# Patient Record
Sex: FEMALE | Race: WHITE | Employment: FULL TIME | URBAN - METROPOLITAN AREA
[De-identification: names, ages, dates, MRNs, and addresses within clinical notes are randomized per-mention and may not be internally consistent; named-entity substitution may affect disease eponyms.]

---

## 2017-02-09 ENCOUNTER — HOSPITAL ENCOUNTER (OUTPATIENT)
Age: 27
Setting detail: SPECIMEN
Discharge: HOME | End: 2017-02-09
Payer: MEDICAID

## 2017-02-09 ENCOUNTER — OFFICE VISIT (OUTPATIENT)
Dept: OBGYN | Facility: CLINIC | Age: 27
End: 2017-02-09

## 2017-02-09 VITALS
SYSTOLIC BLOOD PRESSURE: 131 MMHG | HEIGHT: 60 IN | HEART RATE: 137 BPM | BODY MASS INDEX: 39.13 KG/M2 | TEMPERATURE: 98.1 F | RESPIRATION RATE: 19 BRPM | WEIGHT: 199.3 LBS | DIASTOLIC BLOOD PRESSURE: 85 MMHG

## 2017-02-09 DIAGNOSIS — Z71.1 CONCERN ABOUT STD IN FEMALE WITHOUT DIAGNOSIS: ICD-10-CM

## 2017-02-09 DIAGNOSIS — R30.0 BURNING WITH URINATION: Primary | ICD-10-CM

## 2017-02-09 DIAGNOSIS — N90.89 VULVAR LESION: ICD-10-CM

## 2017-02-09 DIAGNOSIS — N89.8 VAGINAL DISCHARGE: ICD-10-CM

## 2017-02-09 LAB
DIRECT EXAM: ABNORMAL
HAV IGM SER IA-ACNC: NONREACTIVE
HEPATITIS B CORE IGM ANTIBODY: NONREACTIVE
HEPATITIS B SURFACE ANTIGEN: NONREACTIVE
HEPATITIS C ANTIBODY: NONREACTIVE
HIV AG/AB: NONREACTIVE
Lab: ABNORMAL
SPECIMEN DESCRIPTION: ABNORMAL
STATUS: ABNORMAL
T. PALLIDUM, IGG: NONREACTIVE

## 2017-02-09 PROCEDURE — 86695 HERPES SIMPLEX TYPE 1 TEST: CPT

## 2017-02-09 PROCEDURE — 87389 HIV-1 AG W/HIV-1&-2 AB AG IA: CPT

## 2017-02-09 PROCEDURE — 80074 ACUTE HEPATITIS PANEL: CPT

## 2017-02-09 PROCEDURE — 86696 HERPES SIMPLEX TYPE 2 TEST: CPT

## 2017-02-09 PROCEDURE — 99203 OFFICE O/P NEW LOW 30 MIN: CPT | Performed by: OBSTETRICS & GYNECOLOGY

## 2017-02-09 PROCEDURE — 86694 HERPES SIMPLEX NES ANTBDY: CPT

## 2017-02-09 PROCEDURE — 86780 TREPONEMA PALLIDUM: CPT

## 2017-02-09 PROCEDURE — 36415 COLL VENOUS BLD VENIPUNCTURE: CPT

## 2017-02-09 RX ORDER — VALACYCLOVIR HYDROCHLORIDE 500 MG/1
1000 TABLET, FILM COATED ORAL 2 TIMES DAILY
Qty: 40 TABLET | Refills: 0 | Status: SHIPPED | OUTPATIENT
Start: 2017-02-09 | End: 2017-03-08 | Stop reason: SDUPTHER

## 2017-02-10 LAB
C TRACH DNA GENITAL QL NAA+PROBE: NEGATIVE
CULTURE: NORMAL
CULTURE: NORMAL
Lab: NORMAL
N. GONORRHOEAE DNA: NEGATIVE
SPECIMEN DESCRIPTION: NORMAL
STATUS: NORMAL

## 2017-02-11 LAB
CULTURE: ABNORMAL
Lab: ABNORMAL
SPECIMEN DESCRIPTION: ABNORMAL
STATUS: ABNORMAL

## 2017-02-13 LAB
HERPES SIMPLEX VIRUS 1 IGG: 0.79
HERPES SIMPLEX VIRUS 2 IGG: 0.18
HERPES TYPE 1/2 IGM COMBINED: 6.87

## 2017-02-14 RX ORDER — METRONIDAZOLE 500 MG/1
500 TABLET ORAL 2 TIMES DAILY
Qty: 14 TABLET | Refills: 0 | Status: SHIPPED | OUTPATIENT
Start: 2017-02-14 | End: 2017-02-21

## 2017-02-14 RX ORDER — FLUCONAZOLE 150 MG/1
150 TABLET ORAL ONCE
Qty: 2 TABLET | Refills: 0 | Status: SHIPPED | OUTPATIENT
Start: 2017-02-14 | End: 2017-02-14

## 2017-03-08 DIAGNOSIS — B00.9 HERPES: Primary | ICD-10-CM

## 2017-03-08 RX ORDER — VALACYCLOVIR HYDROCHLORIDE 500 MG/1
1000 TABLET, FILM COATED ORAL 2 TIMES DAILY
Qty: 40 TABLET | Refills: 0 | Status: SHIPPED | OUTPATIENT
Start: 2017-03-08 | End: 2017-03-18

## 2017-03-29 ENCOUNTER — HOSPITAL ENCOUNTER (OUTPATIENT)
Age: 27
Setting detail: SPECIMEN
Discharge: HOME | End: 2017-03-29
Payer: MEDICAID

## 2017-03-29 ENCOUNTER — OFFICE VISIT (OUTPATIENT)
Dept: OBGYN | Age: 27
End: 2017-03-29
Payer: MEDICAID

## 2017-03-29 VITALS
SYSTOLIC BLOOD PRESSURE: 117 MMHG | BODY MASS INDEX: 32.4 KG/M2 | WEIGHT: 194.5 LBS | RESPIRATION RATE: 19 BRPM | HEART RATE: 101 BPM | DIASTOLIC BLOOD PRESSURE: 76 MMHG | HEIGHT: 65 IN | TEMPERATURE: 98.4 F

## 2017-03-29 DIAGNOSIS — A60.04 HERPES SIMPLEX VULVOVAGINITIS: ICD-10-CM

## 2017-03-29 DIAGNOSIS — Z01.419 WELL WOMAN EXAM: Primary | ICD-10-CM

## 2017-03-29 PROBLEM — A60.00 HERPES GENITALIA: Status: ACTIVE | Noted: 2017-03-29

## 2017-03-29 LAB
DIRECT EXAM: ABNORMAL
Lab: ABNORMAL
SPECIMEN DESCRIPTION: ABNORMAL
STATUS: ABNORMAL

## 2017-03-29 PROCEDURE — 99395 PREV VISIT EST AGE 18-39: CPT | Performed by: OBSTETRICS & GYNECOLOGY

## 2017-03-29 RX ORDER — VALACYCLOVIR HYDROCHLORIDE 500 MG/1
1000 TABLET, FILM COATED ORAL 2 TIMES DAILY
COMMUNITY
End: 2018-03-22 | Stop reason: SDUPTHER

## 2017-03-29 RX ORDER — VALACYCLOVIR HYDROCHLORIDE 500 MG/1
500 TABLET, FILM COATED ORAL 2 TIMES DAILY
Qty: 6 TABLET | Refills: 3 | Status: SHIPPED | OUTPATIENT
Start: 2017-03-29 | End: 2017-04-01

## 2017-03-30 LAB
C TRACH DNA GENITAL QL NAA+PROBE: NEGATIVE
N. GONORRHOEAE DNA: NEGATIVE

## 2017-04-03 DIAGNOSIS — B96.89 BACTERIAL VAGINITIS: Primary | ICD-10-CM

## 2017-04-03 DIAGNOSIS — N76.0 BACTERIAL VAGINITIS: Primary | ICD-10-CM

## 2017-04-03 LAB — CYTOLOGY REPORT: NORMAL

## 2017-04-03 RX ORDER — METRONIDAZOLE 500 MG/1
500 TABLET ORAL 2 TIMES DAILY
Qty: 14 TABLET | Refills: 0 | Status: SHIPPED | OUTPATIENT
Start: 2017-04-03 | End: 2017-04-10

## 2018-03-22 ENCOUNTER — OFFICE VISIT (OUTPATIENT)
Dept: INTERNAL MEDICINE | Age: 28
End: 2018-03-22
Payer: MEDICAID

## 2018-03-22 VITALS
WEIGHT: 199 LBS | HEIGHT: 60 IN | BODY MASS INDEX: 39.07 KG/M2 | DIASTOLIC BLOOD PRESSURE: 70 MMHG | HEART RATE: 68 BPM | SYSTOLIC BLOOD PRESSURE: 123 MMHG

## 2018-03-22 DIAGNOSIS — E66.09 CLASS 2 OBESITY DUE TO EXCESS CALORIES WITHOUT SERIOUS COMORBIDITY WITH BODY MASS INDEX (BMI) OF 38.0 TO 38.9 IN ADULT: ICD-10-CM

## 2018-03-22 DIAGNOSIS — A60.04 HERPES SIMPLEX VULVOVAGINITIS: Primary | ICD-10-CM

## 2018-03-22 DIAGNOSIS — E78.5 DYSLIPIDEMIA: ICD-10-CM

## 2018-03-22 DIAGNOSIS — F17.200 SMOKER: ICD-10-CM

## 2018-03-22 DIAGNOSIS — Z11.59 ENCOUNTER FOR HEPATITIS C SCREENING TEST FOR LOW RISK PATIENT: ICD-10-CM

## 2018-03-22 PROBLEM — E66.812 CLASS 2 OBESITY DUE TO EXCESS CALORIES WITHOUT SERIOUS COMORBIDITY WITH BODY MASS INDEX (BMI) OF 38.0 TO 38.9 IN ADULT: Status: ACTIVE | Noted: 2018-03-22

## 2018-03-22 PROCEDURE — 99203 OFFICE O/P NEW LOW 30 MIN: CPT | Performed by: INTERNAL MEDICINE

## 2018-03-22 PROCEDURE — G8484 FLU IMMUNIZE NO ADMIN: HCPCS | Performed by: INTERNAL MEDICINE

## 2018-03-22 PROCEDURE — G8427 DOCREV CUR MEDS BY ELIG CLIN: HCPCS | Performed by: INTERNAL MEDICINE

## 2018-03-22 PROCEDURE — G8417 CALC BMI ABV UP PARAM F/U: HCPCS | Performed by: INTERNAL MEDICINE

## 2018-03-22 PROCEDURE — 99213 OFFICE O/P EST LOW 20 MIN: CPT

## 2018-03-22 PROCEDURE — 4004F PT TOBACCO SCREEN RCVD TLK: CPT | Performed by: INTERNAL MEDICINE

## 2018-03-22 RX ORDER — VALACYCLOVIR HYDROCHLORIDE 500 MG/1
1000 TABLET, FILM COATED ORAL 2 TIMES DAILY
Qty: 60 TABLET | Refills: 2 | Status: SHIPPED | OUTPATIENT
Start: 2018-03-22 | End: 2018-10-29 | Stop reason: SDUPTHER

## 2018-03-22 ASSESSMENT — ENCOUNTER SYMPTOMS
NAUSEA: 0
SINUS PAIN: 0
BLURRED VISION: 0
BLOOD IN STOOL: 0
SPUTUM PRODUCTION: 0
COUGH: 0
EYE REDNESS: 0
BACK PAIN: 1
CONSTIPATION: 0
ABDOMINAL PAIN: 0
SHORTNESS OF BREATH: 0

## 2018-03-22 ASSESSMENT — PATIENT HEALTH QUESTIONNAIRE - PHQ9
2. FEELING DOWN, DEPRESSED OR HOPELESS: 0
1. LITTLE INTEREST OR PLEASURE IN DOING THINGS: 0
SUM OF ALL RESPONSES TO PHQ9 QUESTIONS 1 & 2: 0
SUM OF ALL RESPONSES TO PHQ QUESTIONS 1-9: 0

## 2018-03-22 NOTE — PROGRESS NOTES
Visit Information    Have you changed or started any medications since your last visit including any over-the-counter medicines, vitamins, or herbal medicines? no   Are you having any side effects from any of your medications? -  no  Have you stopped taking any of your medications? Is so, why? -  no    Have you seen any other physician or provider since your last visit? No  Have you had any other diagnostic tests since your last visit? No  Have you been seen in the emergency room and/or had an admission to a hospital since we last saw you? No  Have you had your routine dental cleaning in the past 6 months? no    Have you activated your [employer] account? If not, what are your barriers?  No:      Patient Care Team:  Sae Pritchard MD as PCP - General (Internal Medicine)    Medical History Review  Past Medical, Family, and Social History reviewed and does contribute to the patient presenting condition    Health Maintenance   Topic Date Due    Pneumococcal med risk (1 of 1 - PPSV23) 04/10/2007    Flu vaccine (1) 09/01/2017    Cervical cancer screen  03/29/2020    DTaP/Tdap/Td vaccine (2 - Td) 10/13/2026    HIV screen  Completed
Component Value Date    WBC 9.6 07/28/2015    HGB 13.2 07/28/2015     07/28/2015     BMP:    Lab Results   Component Value Date     02/25/2014    K 4.3 02/25/2014     02/25/2014    CO2 26 02/25/2014    BUN 10 02/25/2014    CREATININE 0.56 02/25/2014    GLUCOSE 93 07/28/2015     Hemoglobin A1C:   Lab Results   Component Value Date    LABA1C 4.9 07/28/2015     Lipid profile:   Lab Results   Component Value Date    CHOL 236 07/28/2015    TRIG 195 07/28/2015    HDL 37 07/28/2015     Lab Results   Component Value Date    LDLCHOLESTEROL 160 (H) 07/28/2015       Thyroid functions:   Lab Results   Component Value Date    TSH 2.86 07/28/2015      Hepatic functions: No results found for: ALT, AST, PROT, BILITOT, BILIDIR, LABALBU  ASSESSMENT AND PLAN:   1. Herpes simplex vulvovaginitis    - valACYclovir (VALTREX) 500 MG tablet; Take 2 tablets by mouth 2 times daily  Dispense: 60 tablet; Refill: 2    2. Dyslipidemia    - Lipid Panel; Future    3. Encounter for hepatitis C screening test for low risk patient    - Hepatitis Panel, Acute; Future    4. Class 2 obesity due to excess calories without serious comorbidity with body mass index (BMI) of 38.0 to 38.9 in adult    - Lipid Panel; Future  - Hemoglobin A1C; Future    5. Smoker      INSTRUCTIONS:   1. Return in about 6 months (around 9/22/2018). 2. Juan Sinclair received counseling on the following healthy behaviors: nutrition, exercise,  and tobacco cessation    3. Reviewed prior labs and health maintenance. 4. Discussed use, benefit, and side effects of prescribed medications. Barriers to medication compliance addressed. All patient questions answered. Pt voiced understanding.        Cristy Boston MD    3/22/2018, 3:08 PM

## 2018-07-02 ENCOUNTER — TELEPHONE (OUTPATIENT)
Dept: INTERNAL MEDICINE | Age: 28
End: 2018-07-02

## 2018-10-29 ENCOUNTER — HOSPITAL ENCOUNTER (OUTPATIENT)
Age: 28
Setting detail: SPECIMEN
Discharge: HOME | End: 2018-10-29
Payer: MEDICAID

## 2018-10-29 ENCOUNTER — OFFICE VISIT (OUTPATIENT)
Dept: INTERNAL MEDICINE | Age: 28
End: 2018-10-29
Payer: MEDICAID

## 2018-10-29 VITALS
WEIGHT: 208 LBS | BODY MASS INDEX: 39.27 KG/M2 | HEIGHT: 61 IN | HEART RATE: 74 BPM | SYSTOLIC BLOOD PRESSURE: 120 MMHG | DIASTOLIC BLOOD PRESSURE: 78 MMHG

## 2018-10-29 DIAGNOSIS — E78.5 DYSLIPIDEMIA: Primary | ICD-10-CM

## 2018-10-29 DIAGNOSIS — E78.5 DYSLIPIDEMIA: ICD-10-CM

## 2018-10-29 DIAGNOSIS — Z11.59 ENCOUNTER FOR HEPATITIS C SCREENING TEST FOR LOW RISK PATIENT: ICD-10-CM

## 2018-10-29 DIAGNOSIS — E66.09 CLASS 2 OBESITY DUE TO EXCESS CALORIES WITHOUT SERIOUS COMORBIDITY WITH BODY MASS INDEX (BMI) OF 38.0 TO 38.9 IN ADULT: ICD-10-CM

## 2018-10-29 DIAGNOSIS — A60.04 HERPES SIMPLEX VULVOVAGINITIS: ICD-10-CM

## 2018-10-29 DIAGNOSIS — F17.200 SMOKER: ICD-10-CM

## 2018-10-29 LAB
CHOLESTEROL/HDL RATIO: 4.7
CHOLESTEROL: 236 MG/DL
ESTIMATED AVERAGE GLUCOSE: 97 MG/DL
HAV IGM SER IA-ACNC: NONREACTIVE
HBA1C MFR BLD: 5 % (ref 4–6)
HDLC SERPL-MCNC: 50 MG/DL
HEPATITIS B CORE IGM ANTIBODY: NONREACTIVE
HEPATITIS B SURFACE ANTIGEN: NONREACTIVE
HEPATITIS C ANTIBODY: NONREACTIVE
LDL CHOLESTEROL: 152 MG/DL (ref 0–130)
TRIGL SERPL-MCNC: 170 MG/DL
VLDLC SERPL CALC-MCNC: ABNORMAL MG/DL (ref 1–30)

## 2018-10-29 PROCEDURE — 99211 OFF/OP EST MAY X REQ PHY/QHP: CPT | Performed by: INTERNAL MEDICINE

## 2018-10-29 PROCEDURE — 99213 OFFICE O/P EST LOW 20 MIN: CPT | Performed by: INTERNAL MEDICINE

## 2018-10-29 PROCEDURE — 80074 ACUTE HEPATITIS PANEL: CPT

## 2018-10-29 PROCEDURE — 80061 LIPID PANEL: CPT

## 2018-10-29 PROCEDURE — 83036 HEMOGLOBIN GLYCOSYLATED A1C: CPT

## 2018-10-29 PROCEDURE — 36415 COLL VENOUS BLD VENIPUNCTURE: CPT

## 2018-10-29 RX ORDER — VALACYCLOVIR HYDROCHLORIDE 500 MG/1
1000 TABLET, FILM COATED ORAL 2 TIMES DAILY
Qty: 10 TABLET | Refills: 3 | Status: SHIPPED | OUTPATIENT
Start: 2018-10-29 | End: 2019-12-05 | Stop reason: SDUPTHER

## 2018-10-29 NOTE — PROGRESS NOTES
completed and found to be normal except noted in the HPI    Review of Systems   Constitutional: Negative for appetite change, fatigue and fever. Respiratory: Negative for cough, shortness of breath and wheezing. Cardiovascular: Negative for chest pain, palpitations and leg swelling. Endocrine: Negative for polydipsia and polyuria. Genitourinary: Negative for dysuria, frequency and genital sores. Allergic/Immunologic: Negative for environmental allergies and immunocompromised state. Neurological: Negative for dizziness, weakness and headaches. Hematological: Negative for adenopathy. Does not bruise/bleed easily. PHYSICAL EXAM:     Vitals:    10/29/18 0943   BP: 120/78   Site: Left Upper Arm   Position: Sitting   Cuff Size: Medium Adult   Pulse: 74   Weight: 208 lb (94.3 kg)   Height: 5' 1\" (1.549 m)     Body mass index is 39.3 kg/m². BP Readings from Last 3 Encounters:   10/29/18 120/78   03/22/18 123/70   03/29/17 117/76        Wt Readings from Last 3 Encounters:   10/29/18 208 lb (94.3 kg)   03/22/18 199 lb (90.3 kg)   03/29/17 194 lb 8 oz (88.2 kg)       Physical Exam      HENT: Normocephalic, Atraumatic,  Neck- Normal range of motion, No tenderness, Supple, No stridor. Eyes:  PERRL, EOMI, Conjunctiva normal, No discharge. Respiratory:  Normalbreath sounds, No respiratory distress, No wheezing  Cardiovascular:  Normal heart rate, Normal rhythm, No murmurs   GI:  Bowel sounds normal, Soft, No tenderness  Musculoskeletal:  Intact distal pulses, No edema, No tenderness  Integument:  Warm, Dry, No erythema, No rash. Lymphatic:  No lymphadenopathy noted.    Neurologic:  Alert & oriented x 3, Normal motor function, Normal sensory function    LABORATORY FINDINGS:    CBC:  Lab Results   Component Value Date    WBC 9.6 07/28/2015    HGB 13.2 07/28/2015     07/28/2015     BMP:    Lab Results   Component Value Date     02/25/2014    K 4.3 02/25/2014     02/25/2014    CO2 26

## 2018-11-04 ASSESSMENT — ENCOUNTER SYMPTOMS
WHEEZING: 0
SHORTNESS OF BREATH: 0
COUGH: 0

## 2019-03-27 ENCOUNTER — TELEPHONE (OUTPATIENT)
Dept: INTERNAL MEDICINE | Age: 29
End: 2019-03-27

## 2019-05-09 ENCOUNTER — OFFICE VISIT (OUTPATIENT)
Dept: INTERNAL MEDICINE | Age: 29
End: 2019-05-09
Payer: MEDICAID

## 2019-05-09 VITALS
BODY MASS INDEX: 37.19 KG/M2 | HEIGHT: 61 IN | WEIGHT: 197 LBS | SYSTOLIC BLOOD PRESSURE: 122 MMHG | DIASTOLIC BLOOD PRESSURE: 81 MMHG | HEART RATE: 88 BPM

## 2019-05-09 DIAGNOSIS — A60.04 HERPES SIMPLEX VULVOVAGINITIS: ICD-10-CM

## 2019-05-09 DIAGNOSIS — F17.200 SMOKER: ICD-10-CM

## 2019-05-09 DIAGNOSIS — E78.5 DYSLIPIDEMIA: Primary | ICD-10-CM

## 2019-05-09 DIAGNOSIS — E66.09 CLASS 2 OBESITY DUE TO EXCESS CALORIES WITHOUT SERIOUS COMORBIDITY WITH BODY MASS INDEX (BMI) OF 38.0 TO 38.9 IN ADULT: ICD-10-CM

## 2019-05-09 PROCEDURE — G8427 DOCREV CUR MEDS BY ELIG CLIN: HCPCS | Performed by: INTERNAL MEDICINE

## 2019-05-09 PROCEDURE — 99213 OFFICE O/P EST LOW 20 MIN: CPT | Performed by: INTERNAL MEDICINE

## 2019-05-09 PROCEDURE — 4004F PT TOBACCO SCREEN RCVD TLK: CPT | Performed by: INTERNAL MEDICINE

## 2019-05-09 PROCEDURE — 99211 OFF/OP EST MAY X REQ PHY/QHP: CPT | Performed by: INTERNAL MEDICINE

## 2019-05-09 PROCEDURE — G8417 CALC BMI ABV UP PARAM F/U: HCPCS | Performed by: INTERNAL MEDICINE

## 2019-05-09 ASSESSMENT — PATIENT HEALTH QUESTIONNAIRE - PHQ9
SUM OF ALL RESPONSES TO PHQ9 QUESTIONS 1 & 2: 0
2. FEELING DOWN, DEPRESSED OR HOPELESS: 0
SUM OF ALL RESPONSES TO PHQ QUESTIONS 1-9: 0
SUM OF ALL RESPONSES TO PHQ QUESTIONS 1-9: 0
1. LITTLE INTEREST OR PLEASURE IN DOING THINGS: 0

## 2019-05-09 NOTE — PROGRESS NOTES
review of symptoms completed and found to be normal except noted in the HPI    Review of Systems   Constitutional: Negative for appetite change, fatigue and fever. Respiratory: Negative for cough, shortness of breath and wheezing. Cardiovascular: Negative for chest pain, palpitations and leg swelling. Endocrine: Negative for polydipsia and polyuria. Genitourinary: Negative for dysuria, frequency and genital sores. Allergic/Immunologic: Negative for environmental allergies and immunocompromised state. Neurological: Negative for dizziness, weakness and headaches. Hematological: Negative for adenopathy. Does not bruise/bleed easily. PHYSICAL EXAM:     Vitals:    05/09/19 1130   BP: 122/81   Site: Left Upper Arm   Position: Sitting   Cuff Size: Medium Adult   Pulse: 88   Weight: 197 lb (89.4 kg)   Height: 5' 1\" (1.549 m)     Body mass index is 37.22 kg/m². BP Readings from Last 3 Encounters:   05/09/19 122/81   10/29/18 120/78   03/22/18 123/70        Wt Readings from Last 3 Encounters:   05/09/19 197 lb (89.4 kg)   10/29/18 208 lb (94.3 kg)   03/22/18 199 lb (90.3 kg)       Physical Exam      HENT: Normocephalic, Atraumatic,  Neck- Normal range of motion, No tenderness, Supple, No stridor. Eyes:  PERRL, EOMI, Conjunctiva normal, No discharge. Respiratory:  Normalbreath sounds, No respiratory distress, No wheezing  Cardiovascular:  Normal heart rate, Normal rhythm, No murmurs   GI:  Bowel sounds normal, Soft, No tenderness  Musculoskeletal:  Intact distal pulses, No edema, No tenderness  Integument:  Warm, Dry, No erythema, No rash. Lymphatic:  No lymphadenopathy noted.    Neurologic:  Alert & oriented x 3, Normal motor function, Normal sensory function    LABORATORY FINDINGS:    CBC:  Lab Results   Component Value Date    WBC 9.6 07/28/2015    HGB 13.2 07/28/2015     07/28/2015     BMP:    Lab Results   Component Value Date     02/25/2014    K 4.3 02/25/2014     02/25/2014 CO2 26 02/25/2014    BUN 10 02/25/2014    CREATININE 0.56 02/25/2014    GLUCOSE 93 07/28/2015     HEMOGLOBIN A1C:   Lab Results   Component Value Date    LABA1C 5.0 10/29/2018     MICROALBUMIN URINE: No results found for: MICROALBUR  FASTING LIPID [email or web address]  Lab Results   Component Value Date    LDLCHOLESTEROL 152 (H) 10/29/2018       LIVER PROFILE:No results found for: ALT, AST, PROT, BILITOT, BILIDIR, LABALBU   THYROID FUNCTION:   Lab Results   Component Value Date    TSH 2.86 07/28/2015      URINEANALYSIS: No results found for: LABURIN  ASSESSMENT AND PLAN:    1. Dyslipidemia    - Lipid Panel; Future  - Comprehensive Metabolic Panel; Future  - TSH with Reflex; Future    2. Herpes simplex vulvovaginitis  Valtrex as needed      3. Class 2 obesity due to excess calories without serious comorbidity with body mass index (BMI) of 38.0 to 38.9 in adult    - TSH with Reflex; Future    4. Smoker            FOLLOW UP AND INSTRUCTIONS:   Return in about 6 months (around 11/9/2019). 1. Long Arzate received counseling on the following healthy behaviors: nutrition and exercise    2. Reviewed prior labs and health maintenance. 3. Discussed use, benefit, and side effects of prescribed medications. Barriers to medication compliance addressed. All patient questions answered. Pt voiced understanding.      4. Patient given educational materials - see patient instructions    Rut Steel  Attending Physician, [address] [place] [address], Internal Medicine Residency Program  [address] [place] [address]  5/9/2019, 11:43 AM

## 2019-05-09 NOTE — PATIENT INSTRUCTIONS
LABORATORY INSTRUCTIONS    Your doctor has ordered blood or urine testing. You can get this testing done at the Lab located on the first floor of the [facility], or at any other [facility]. Please stop at Main Registration, before going to the lab, as you must be registered first.     Please get this lab done before your next visit. You may NOT eat, drink, smoke, or chew anything before this test for 8-12 hours. You may still have water. We will call you to schedule your 6 month follow up appointment. Please return to the clinic as needed. After Visit Summary  given and reviewed. --MA    It is very important for your care that you keep your appointment. If for some reason you are unable to keep your appointment it is equally important that you call our office at [phone] to cancel your appointment and reschedule. Failure to do so may result in your termination from our practice.

## 2019-08-19 ENCOUNTER — TELEPHONE (OUTPATIENT)
Dept: INTERNAL MEDICINE | Age: 29
End: 2019-08-19

## 2019-11-27 ENCOUNTER — TELEPHONE (OUTPATIENT)
Dept: INTERNAL MEDICINE | Age: 29
End: 2019-11-27

## 2019-12-05 ENCOUNTER — TELEPHONE (OUTPATIENT)
Dept: INTERNAL MEDICINE | Age: 29
End: 2019-12-05

## 2019-12-05 ENCOUNTER — OFFICE VISIT (OUTPATIENT)
Dept: INTERNAL MEDICINE | Age: 29
End: 2019-12-05
Payer: MEDICAID

## 2019-12-05 ENCOUNTER — HOSPITAL ENCOUNTER (OUTPATIENT)
Age: 29
Setting detail: SPECIMEN
Discharge: HOME | End: 2019-12-05
Payer: MEDICAID

## 2019-12-05 VITALS
HEART RATE: 97 BPM | WEIGHT: 220 LBS | DIASTOLIC BLOOD PRESSURE: 87 MMHG | SYSTOLIC BLOOD PRESSURE: 118 MMHG | BODY MASS INDEX: 41.57 KG/M2

## 2019-12-05 DIAGNOSIS — E66.09 CLASS 2 OBESITY DUE TO EXCESS CALORIES WITHOUT SERIOUS COMORBIDITY WITH BODY MASS INDEX (BMI) OF 38.0 TO 38.9 IN ADULT: ICD-10-CM

## 2019-12-05 DIAGNOSIS — R53.83 OTHER FATIGUE: ICD-10-CM

## 2019-12-05 DIAGNOSIS — E78.5 DYSLIPIDEMIA: Primary | ICD-10-CM

## 2019-12-05 DIAGNOSIS — A60.04 HERPES SIMPLEX VULVOVAGINITIS: ICD-10-CM

## 2019-12-05 DIAGNOSIS — E78.5 DYSLIPIDEMIA: ICD-10-CM

## 2019-12-05 LAB
ABSOLUTE EOS #: 0.34 K/UL (ref 0–0.44)
ABSOLUTE IMMATURE GRANULOCYTE: 0.04 K/UL (ref 0–0.3)
ABSOLUTE LYMPH #: 2.38 K/UL (ref 1.1–3.7)
ABSOLUTE MONO #: 0.49 K/UL (ref 0.1–1.2)
ALBUMIN SERPL-MCNC: 4.4 G/DL (ref 3.5–5.2)
ALBUMIN/GLOBULIN RATIO: 1.7 (ref 1–2.5)
ALP BLD-CCNC: 74 U/L (ref 35–104)
ALT SERPL-CCNC: 22 U/L (ref 5–33)
ANION GAP SERPL CALCULATED.3IONS-SCNC: 11 MMOL/L (ref 9–17)
AST SERPL-CCNC: 16 U/L
BASOPHILS # BLD: 1 % (ref 0–2)
BASOPHILS ABSOLUTE: 0.04 K/UL (ref 0–0.2)
BILIRUB SERPL-MCNC: 0.27 MG/DL (ref 0.3–1.2)
BUN BLDV-MCNC: 13 MG/DL (ref 6–20)
BUN/CREAT BLD: ABNORMAL (ref 9–20)
CALCIUM SERPL-MCNC: 9.1 MG/DL (ref 8.6–10.4)
CHLORIDE BLD-SCNC: 106 MMOL/L (ref 98–107)
CHOLESTEROL/HDL RATIO: 5.3
CHOLESTEROL: 237 MG/DL
CO2: 22 MMOL/L (ref 20–31)
CREAT SERPL-MCNC: 0.61 MG/DL (ref 0.5–0.9)
DIFFERENTIAL TYPE: ABNORMAL
EOSINOPHILS RELATIVE PERCENT: 4 % (ref 1–4)
GFR AFRICAN AMERICAN: >60 ML/MIN
GFR NON-AFRICAN AMERICAN: >60 ML/MIN
GFR SERPL CREATININE-BSD FRML MDRD: ABNORMAL ML/MIN/{1.73_M2}
GFR SERPL CREATININE-BSD FRML MDRD: ABNORMAL ML/MIN/{1.73_M2}
GLUCOSE BLD-MCNC: 94 MG/DL (ref 70–99)
HCT VFR BLD CALC: 44.2 % (ref 36.3–47.1)
HDLC SERPL-MCNC: 45 MG/DL
HEMOGLOBIN: 14.4 G/DL (ref 11.9–15.1)
IMMATURE GRANULOCYTES: 1 %
LDL CHOLESTEROL: 154 MG/DL (ref 0–130)
LYMPHOCYTES # BLD: 27 % (ref 24–43)
MCH RBC QN AUTO: 28.9 PG (ref 25.2–33.5)
MCHC RBC AUTO-ENTMCNC: 32.6 G/DL (ref 28.4–34.8)
MCV RBC AUTO: 88.6 FL (ref 82.6–102.9)
MONOCYTES # BLD: 6 % (ref 3–12)
NRBC AUTOMATED: 0 PER 100 WBC
PDW BLD-RTO: 12.1 % (ref 11.8–14.4)
PLATELET # BLD: 262 K/UL (ref 138–453)
PLATELET ESTIMATE: ABNORMAL
PMV BLD AUTO: 11.8 FL (ref 8.1–13.5)
POTASSIUM SERPL-SCNC: 4.2 MMOL/L (ref 3.7–5.3)
RBC # BLD: 4.99 M/UL (ref 3.95–5.11)
RBC # BLD: ABNORMAL 10*6/UL
SEG NEUTROPHILS: 61 % (ref 36–65)
SEGMENTED NEUTROPHILS ABSOLUTE COUNT: 5.5 K/UL (ref 1.5–8.1)
SODIUM BLD-SCNC: 139 MMOL/L (ref 135–144)
TOTAL PROTEIN: 7 G/DL (ref 6.4–8.3)
TRIGL SERPL-MCNC: 190 MG/DL
TSH SERPL DL<=0.05 MIU/L-ACNC: 1.09 MIU/L (ref 0.3–5)
VLDLC SERPL CALC-MCNC: ABNORMAL MG/DL (ref 1–30)
WBC # BLD: 8.8 K/UL (ref 3.5–11.3)
WBC # BLD: ABNORMAL 10*3/UL

## 2019-12-05 PROCEDURE — 80061 LIPID PANEL: CPT

## 2019-12-05 PROCEDURE — 36415 COLL VENOUS BLD VENIPUNCTURE: CPT

## 2019-12-05 PROCEDURE — 80053 COMPREHEN METABOLIC PANEL: CPT

## 2019-12-05 PROCEDURE — 99211 OFF/OP EST MAY X REQ PHY/QHP: CPT | Performed by: INTERNAL MEDICINE

## 2019-12-05 PROCEDURE — G8417 CALC BMI ABV UP PARAM F/U: HCPCS | Performed by: INTERNAL MEDICINE

## 2019-12-05 PROCEDURE — G8484 FLU IMMUNIZE NO ADMIN: HCPCS | Performed by: INTERNAL MEDICINE

## 2019-12-05 PROCEDURE — G8427 DOCREV CUR MEDS BY ELIG CLIN: HCPCS | Performed by: INTERNAL MEDICINE

## 2019-12-05 PROCEDURE — 84443 ASSAY THYROID STIM HORMONE: CPT

## 2019-12-05 PROCEDURE — 4004F PT TOBACCO SCREEN RCVD TLK: CPT | Performed by: INTERNAL MEDICINE

## 2019-12-05 PROCEDURE — 99213 OFFICE O/P EST LOW 20 MIN: CPT | Performed by: INTERNAL MEDICINE

## 2019-12-05 PROCEDURE — 85025 COMPLETE CBC W/AUTO DIFF WBC: CPT

## 2019-12-05 RX ORDER — VALACYCLOVIR HYDROCHLORIDE 500 MG/1
1000 TABLET, FILM COATED ORAL 2 TIMES DAILY
Qty: 60 TABLET | Refills: 2 | Status: SHIPPED | OUTPATIENT
Start: 2019-12-05 | End: 2021-06-23 | Stop reason: SDUPTHER

## 2019-12-05 RX ORDER — VALACYCLOVIR HYDROCHLORIDE 500 MG/1
1000 TABLET, FILM COATED ORAL 2 TIMES DAILY
Qty: 10 TABLET | Refills: 3 | Status: SHIPPED | OUTPATIENT
Start: 2019-12-05 | End: 2019-12-05

## 2019-12-05 ASSESSMENT — ENCOUNTER SYMPTOMS
ABDOMINAL PAIN: 0
COUGH: 0
WHEEZING: 0
BLOOD IN STOOL: 0
SHORTNESS OF BREATH: 0
CONSTIPATION: 0

## 2020-01-16 ENCOUNTER — OFFICE VISIT (OUTPATIENT)
Dept: INTERNAL MEDICINE | Age: 30
End: 2020-01-16
Payer: MEDICAID

## 2020-01-16 VITALS
SYSTOLIC BLOOD PRESSURE: 121 MMHG | DIASTOLIC BLOOD PRESSURE: 79 MMHG | HEART RATE: 87 BPM | WEIGHT: 220 LBS | BODY MASS INDEX: 41.57 KG/M2

## 2020-01-16 PROCEDURE — 4004F PT TOBACCO SCREEN RCVD TLK: CPT | Performed by: INTERNAL MEDICINE

## 2020-01-16 PROCEDURE — G8484 FLU IMMUNIZE NO ADMIN: HCPCS | Performed by: INTERNAL MEDICINE

## 2020-01-16 PROCEDURE — 99211 OFF/OP EST MAY X REQ PHY/QHP: CPT | Performed by: INTERNAL MEDICINE

## 2020-01-16 PROCEDURE — 99213 OFFICE O/P EST LOW 20 MIN: CPT | Performed by: INTERNAL MEDICINE

## 2020-01-16 PROCEDURE — G8417 CALC BMI ABV UP PARAM F/U: HCPCS | Performed by: INTERNAL MEDICINE

## 2020-01-16 PROCEDURE — G8427 DOCREV CUR MEDS BY ELIG CLIN: HCPCS | Performed by: INTERNAL MEDICINE

## 2020-01-16 RX ORDER — PHENTERMINE HYDROCHLORIDE 37.5 MG/1
37.5 TABLET ORAL
Qty: 30 TABLET | Refills: 0 | Status: SHIPPED | OUTPATIENT
Start: 2020-01-16 | End: 2020-02-18 | Stop reason: SDUPTHER

## 2020-01-16 ASSESSMENT — PATIENT HEALTH QUESTIONNAIRE - PHQ9
1. LITTLE INTEREST OR PLEASURE IN DOING THINGS: 0
SUM OF ALL RESPONSES TO PHQ QUESTIONS 1-9: 0
SUM OF ALL RESPONSES TO PHQ9 QUESTIONS 1 & 2: 0
2. FEELING DOWN, DEPRESSED OR HOPELESS: 0
SUM OF ALL RESPONSES TO PHQ QUESTIONS 1-9: 0

## 2020-01-16 NOTE — PROGRESS NOTES
Visit Information    Have you changed or started any medications since your last visit including any over-the-counter medicines, vitamins, or herbal medicines? no   Are you having any side effects from any of your medications? -  no  Have you stopped taking any of your medications? Is so, why? -  no    Have you seen any other physician or provider since your last visit? No  Have you had any other diagnostic tests since your last visit? No  Have you been seen in the emergency room and/or had an admission to a hospital since we last saw you? No  Have you had your routine dental cleaning in the past 6 months? no    Have you activated your [employer] account? If not, what are your barriers?  Yes     Patient Care Team:  Karmen Villa MD as PCP - General (Internal Medicine)  Karmen Villa MD as PCP - [facility]    Medical History Review  Past Medical, Family, and Social History reviewed and does contribute to the patient presenting condition    Health Maintenance   Topic Date Due    Pneumococcal 0-64 years Vaccine (1 of 1 - PPSV23) 04/10/1994    Flu vaccine (1) 09/01/2019    Cervical cancer screen  03/29/2020    DTaP/Tdap/Td vaccine (2 - Td) 10/13/2026    HIV screen  Completed    Varicella Vaccine  Discontinued

## 2020-01-16 NOTE — PROGRESS NOTES
09/01/2019       REVIEW OF SYSTEMS:    12 point review of symptoms completed and found to be normal except noted in the HPI    Review of Systems   Constitutional: Positive for fatigue and unexpected weight change. Respiratory: Negative for cough, shortness of breath and wheezing. Cardiovascular: Negative for chest pain, palpitations and leg swelling. Gastrointestinal: Negative for abdominal pain, blood in stool and constipation. Endocrine: Negative for polydipsia and polyuria. Genitourinary: Positive for menstrual problem. Negative for dysuria, flank pain and urgency. Skin: Negative for rash and wound. Neurological: Negative for dizziness, weakness and headaches. Hematological: Negative for adenopathy. Does not bruise/bleed easily.         PHYSICAL EXAM:     Vitals:    01/16/20 0959   BP: 121/79   Site: Right Upper Arm   Position: Sitting   Cuff Size: Large Adult   Pulse: 87   Weight: 220 lb (99.8 kg)     Body mass index is 41.57 kg/m². BP Readings from Last 3 Encounters:   01/16/20 121/79   12/05/19 118/87   05/09/19 122/81        Wt Readings from Last 3 Encounters:   01/16/20 220 lb (99.8 kg)   12/05/19 220 lb (99.8 kg)   05/09/19 197 lb (89.4 kg)       Physical Exam  HENT: Normocephalic, Atraumatic,  Neck- Normal range of motion, No tenderness, Supple, No stridor. Eyes:  PERRL, EOMI, Conjunctiva normal, No discharge. Respiratory:  Normalbreath sounds, No respiratory distress, No wheezing  Cardiovascular:  Normal heart rate, Normal rhythm, No murmurs   GI:  Bowel sounds normal, Soft, No tenderness  Musculoskeletal:  Intact distal pulses, No edema, No tenderness  Integument:  Warm, Dry, No erythema, No rash. Lymphatic:  No lymphadenopathy noted.    Neurologic:  Alert & oriented x 3, Normal motor function, Normal sensory function    LABORATORY FINDINGS:    CBC:  Lab Results   Component Value Date    WBC 8.8 12/05/2019    HGB 14.4 12/05/2019     12/05/2019     BMP:    Lab Results Component Value Date     12/05/2019    K 4.2 12/05/2019     12/05/2019    CO2 22 12/05/2019    BUN 13 12/05/2019    CREATININE 0.61 12/05/2019    GLUCOSE 94 12/05/2019     HEMOGLOBIN A1C:   Lab Results   Component Value Date    LABA1C 5.0 10/29/2018     MICROALBUMIN URINE: No results found for: MICROALBUR  FASTING LIPID [email or web address]  Lab Results   Component Value Date    LDLCHOLESTEROL 154 (H) 12/05/2019       LIVER PROFILE:  Lab Results   Component Value Date    ALT 22 12/05/2019    AST 16 12/05/2019    PROT 7.0 12/05/2019    BILITOT 0.27 12/05/2019    LABALBU 4.4 12/05/2019      THYROID FUNCTION:   Lab Results   Component Value Date    TSH 1.09 12/05/2019      URINEANALYSIS: No results found for: LABURIN  ASSESSMENT AND PLAN:    1. Morbid obesity with BMI of 40.0-44.9, adult (HCC)  Side effects explained  Advised diet changes  increase physical activity  Hydration  Call if side effects    - phentermine (ADIPEX-P) 37.5 MG tablet; Take 1 tablet by mouth every morning (before breakfast) for 30 days. Dispense: 30 tablet; Refill: 0    2. Dyslipidemia  Diet changes  Start statins next time if not improved  discussed          FOLLOW UP AND INSTRUCTIONS:   Return in about 4 weeks (around 2/13/2020). 1. Reggie Reyes received counseling on the following healthy behaviors: nutrition, exercise and medication adherence    2. Reviewed prior labs and health maintenance. 3. Discussed use, benefit, and side effects of prescribed medications. Barriers to medication compliance addressed. All patient questions answered. Pt voiced understanding.        Geovanni Lucas  Attending Physician, [address] [place] [address], Internal Medicine Residency Program  [address] [place] [address]  1/16/2020, 10:22 AM

## 2020-01-16 NOTE — PATIENT INSTRUCTIONS
levels help your doctor find out your risk for having a heart attack or stroke. You and your doctor can talk about whether you need to lower your risk and what treatment is best for you. A heart-healthy lifestyle along with medicines can help lower your cholesterol and your risk. The way you choose to lower your risk will depend on how high your risk is for heart attack and stroke. It will also depend on how you feel about taking medicines. Follow-up care is a key part of your treatment and safety. Be sure to make and go to all appointments, and call your doctor if you are having problems. It's also a good idea to know your test results and keep a list of the medicines you take. How can you care for yourself at home? · Eat a variety of foods every day. Good choices include fruits, vegetables, whole grains (like oatmeal), dried beans and peas, nuts and seeds, soy products (like tofu), and fat-free or low-fat dairy products. · Replace butter, margarine, and hydrogenated or partially hydrogenated oils with olive and canola oils. (Canola oil margarine without trans fat is fine.)  · Replace red meat with fish, poultry, and soy protein (like tofu). · Limit processed and packaged foods like chips, crackers, and cookies. · Bake, broil, or steam foods. Don't abreu them. · Be physically active. Get at least 30 minutes of exercise on most days of the week. Walking is a good choice. You also may want to do other activities, such as running, swimming, cycling, or playing tennis or team sports. · Stay at a healthy weight or lose weight by making the changes in eating and physical activity listed above. Losing just a small amount of weight, even 5 to 10 pounds, can reduce your risk for having a heart attack or stroke. · Do not smoke. When should you call for help?   Watch closely for changes in your health, and be sure to contact your doctor if:    · You need help making lifestyle changes.     · You have questions about your medicine. Where can you learn more? Go to [email or web address] and sign in to your [employer] account. Enter V008 in the Sprio box to learn more about \"High Cholesterol: Care Instructions. \"     If you do not have an account, please click on the \"Sign Up Now\" link. Current as of: April 9, 2019  Content Version: 12.3  © 1235-7720 Healthwise, [employer]. Care instructions adapted under license by [facility] ([facility]). If you have questions about a medical condition or this instruction, always ask your healthcare professional. [place] [address] any warranty or liability for your use of this information. Return To Clinic 2/18/2020 . After Visit Summary  given and reviewed. It is very important for your care that you keep your appointment. If for some reason you are unable to keep your appointment it is equally important that you call our office at [phone] to cancel your appointment and reschedule.  Failure to do so may result in your termination from our practice.     -Printed script for Adipex signed and given to pt--Valentina

## 2020-02-18 ENCOUNTER — OFFICE VISIT (OUTPATIENT)
Dept: INTERNAL MEDICINE | Age: 30
End: 2020-02-18
Payer: MEDICAID

## 2020-02-18 VITALS
HEART RATE: 75 BPM | WEIGHT: 213 LBS | DIASTOLIC BLOOD PRESSURE: 76 MMHG | HEIGHT: 61 IN | BODY MASS INDEX: 40.22 KG/M2 | SYSTOLIC BLOOD PRESSURE: 118 MMHG

## 2020-02-18 PROCEDURE — 99211 OFF/OP EST MAY X REQ PHY/QHP: CPT | Performed by: INTERNAL MEDICINE

## 2020-02-18 PROCEDURE — G8427 DOCREV CUR MEDS BY ELIG CLIN: HCPCS | Performed by: INTERNAL MEDICINE

## 2020-02-18 PROCEDURE — G8417 CALC BMI ABV UP PARAM F/U: HCPCS | Performed by: INTERNAL MEDICINE

## 2020-02-18 PROCEDURE — 4004F PT TOBACCO SCREEN RCVD TLK: CPT | Performed by: INTERNAL MEDICINE

## 2020-02-18 PROCEDURE — 99213 OFFICE O/P EST LOW 20 MIN: CPT | Performed by: INTERNAL MEDICINE

## 2020-02-18 PROCEDURE — G8484 FLU IMMUNIZE NO ADMIN: HCPCS | Performed by: INTERNAL MEDICINE

## 2020-02-18 RX ORDER — PHENTERMINE HYDROCHLORIDE 37.5 MG/1
37.5 TABLET ORAL
Qty: 30 TABLET | Refills: 0 | Status: SHIPPED | OUTPATIENT
Start: 2020-02-18 | End: 2020-03-16 | Stop reason: SDUPTHER

## 2020-02-18 NOTE — PATIENT INSTRUCTIONS
Return To Clinic 3/17/2020 . After Visit Summary  given and reviewed. It is very important for your care that you keep your appointment. If for some reason you are unable to keep your appointment it is equally important that you call our office at [phone] to cancel your appointment and reschedule. Failure to do so may result in your termination from our practice.     -Printed script for Adipex signed and given to pt    MALKA David Courser

## 2020-02-18 NOTE — PROGRESS NOTES
Visit Information    Have you changed or started any medications since your last visit including any over-the-counter medicines, vitamins, or herbal medicines? no   Are you having any side effects from any of your medications? -  no  Have you stopped taking any of your medications? Is so, why? -  no    Have you seen any other physician or provider since your last visit? No  Have you had any other diagnostic tests since your last visit? No  Have you been seen in the emergency room and/or had an admission to a hospital since we last saw you? No  Have you had your routine dental cleaning in the past 6 months? no    Have you activated your [facility] account? If not, what are your barriers?  Yes     Patient Care Team:  Clement Alston MD as PCP - General (Internal Medicine)  Clement Alston MD as PCP - [facility]    Medical History Review  Past Medical, Family, and Social History reviewed and does contribute to the patient presenting condition    Health Maintenance   Topic Date Due    Flu vaccine (1) 06/30/2020 (Originally 9/1/2019)    Pneumococcal 0-64 years Vaccine (1 of 1 - PPSV23) 07/16/2020 (Originally 4/10/1994)    Cervical cancer screen  03/29/2020    DTaP/Tdap/Td vaccine (2 - Td) 10/13/2026    Shingles Vaccine (1 of 2) 04/10/2038    HIV screen  Completed    Hepatitis A vaccine  Aged Out    Hepatitis B vaccine  Aged Out    Hib vaccine  Aged Out    Meningococcal (ACWY) vaccine  Aged Out    Varicella vaccine  Discontinued

## 2020-02-18 NOTE — PROGRESS NOTES
[facility]    Progress Note    Date of patient's visit: 2020    Patient's Name:  Sara Hussein    YOB: 1990            Patient Care Team:  Chava Benito MD as PCP - General (Internal Medicine)  Chava Benito MD as PCP - [facility]    REASON FOR VISIT: Routine outpatient follow     Chief Complaint   Patient presents with    Weight Loss     Pt is here for adipex refill          HISTORY OF PRESENT ILLNESS:    History was obtained from the patient. Sara Hussein is a 30 y.o. is here for up on morbid obesity. She was started on Adipex last.  She is doing well. She did for the first 2 or 3 days she felt a little jittery but that has resolved. No sleep issues. No palpitations. She is still going to the gym regularly. She is trying to eat right. She is lost only about 6 pounds. No other concerns. Past Medical History:   Diagnosis Date    HSV        Past Surgical History:   Procedure Laterality Date     SECTION           ALLERGIES    No Known Allergies    MEDICATIONS:      Current Outpatient Medications on File Prior to Visit   Medication Sig Dispense Refill    valACYclovir (VALTREX) 500 MG tablet Take 2 tablets by mouth 2 times daily 60 tablet 2     No current facility-administered medications on file prior to visit. SOCIAL HISTORY    Reviewed and no change from previous record. Isabel Samson  reports that she has been smoking cigarettes. She has a 5.00 pack-year smoking history. She has never used smokeless tobacco.    FAMILY HISTORY:    Reviewed and No change from previous visit    HEALTH MAINTENANCE DUE:    There are no preventive care reminders to display for this patient. REVIEW OF SYSTEMS:    12 point review of symptoms completed and found to be normal except noted in the HPI    Review of Systems     Constitutional: Positive for fatigue and unexpected weight change.    Respiratory: Negative

## 2020-03-05 ENCOUNTER — OFFICE VISIT (OUTPATIENT)
Dept: OBGYN | Age: 30
End: 2020-03-05
Payer: MEDICAID

## 2020-03-05 ENCOUNTER — HOSPITAL ENCOUNTER (OUTPATIENT)
Age: 30
Setting detail: SPECIMEN
Discharge: HOME | End: 2020-03-05
Payer: MEDICAID

## 2020-03-05 VITALS
BODY MASS INDEX: 40.31 KG/M2 | HEART RATE: 62 BPM | DIASTOLIC BLOOD PRESSURE: 83 MMHG | HEIGHT: 61 IN | SYSTOLIC BLOOD PRESSURE: 131 MMHG | WEIGHT: 213.5 LBS

## 2020-03-05 PROCEDURE — 99395 PREV VISIT EST AGE 18-39: CPT | Performed by: OBSTETRICS & GYNECOLOGY

## 2020-03-05 PROCEDURE — 90651 9VHPV VACCINE 2/3 DOSE IM: CPT | Performed by: OBSTETRICS & GYNECOLOGY

## 2020-03-05 PROCEDURE — G8484 FLU IMMUNIZE NO ADMIN: HCPCS | Performed by: OBSTETRICS & GYNECOLOGY

## 2020-03-05 NOTE — PROGRESS NOTES
History and Physical    Kristal Win  3/5/2020              30 y.o. Chief Complaint   Patient presents with    Gynecologic Exam     pap 3/29/17 wnl        Patient's last menstrual period was 01/15/2020. Primary Care Physician: Jen Allen MD    The patient was seen and examined. She has no chief complaint today and is here for her annual exam.  Her bowels are regular. There are no voiding complaints. She denies any bloating. She denies vaginal discharge and was counseled on STD's and the need for barrier contraception. HPI : Kristal Win is a 30 y.o. female     Pt here for annual exam.    Patient states her periods are irregular and painful. She also desires pregnancy, but has not been able to get pregnant since her last delivery. LMP 2020-2020. Pt states they are occurring once every three months. When they occur, the pain is affecting her ADLs and sometimes she has to call off of work. Explained to patient that we should do a workup for her irregular periods, including blood work and ultrasound. Pt is amenable.  ________________________________________________________________________  OB History    Para Term  AB Living   1 1 0 1 0 1   SAB TAB Ectopic Molar Multiple Live Births   0 0 0 0 0 1      # Outcome Date GA Lbr Joe/2nd Weight Sex Delivery Anes PTL Lv   1  08 28w0d  2 lb 11 oz (1.219 kg) F CS-Unspec  Y RENEE      Obstetric Comments   G1- Pt states she had a \"low placenta\" and she began to contract and bleed.   [facility].      Past Medical History:   Diagnosis Date    HSV                                                                    Past Surgical History:   Procedure Laterality Date     SECTION       Family History   Problem Relation Age of Onset    Breast Cancer Mother 48        breast ca    High Blood Pressure Mother     Breast Cancer Father         skin ca    Heart Disease Father         heart transplant    Diabetes Father      Social History     Socioeconomic History    Marital status: Single     Spouse name: Not on file    Number of children: Not on file    Years of education: Not on file    Highest education level: Not on file   Occupational History    Not on file   Social Needs    Financial resource strain: Not on file    Food insecurity:     Worry: Not on file     Inability: Not on file    Transportation needs:     Medical: Not on file     Non-medical: Not on file   Tobacco Use    Smoking status: Current Every Day Smoker     Packs/day: 0.50     Years: 10.00     Pack years: 5.00     Types: Cigarettes    Smokeless tobacco: Never Used   Substance and Sexual Activity    Alcohol use: Yes     Comment: socially    Drug use: No    Sexual activity: Not on file   Lifestyle    Physical activity:     Days per week: Not on file     Minutes per session: Not on file    Stress: Not on file   Relationships    Social connections:     Talks on phone: Not on file     Gets together: Not on file     Attends Sikhism service: Not on file     Active member of club or organization: Not on file     Attends meetings of clubs or organizations: Not on file     Relationship status: Not on file    Intimate partner violence:     Fear of current or ex partner: Not on file     Emotionally abused: Not on file     Physically abused: Not on file     Forced sexual activity: Not on file   Other Topics Concern    Not on file   Social History Narrative    Not on file       MEDICATIONS:  Current Outpatient Medications   Medication Sig Dispense Refill    phentermine (ADIPEX-P) 37.5 MG tablet Take 1 tablet by mouth every morning (before breakfast) for 30 days. 30 tablet 0    valACYclovir (VALTREX) 500 MG tablet Take 2 tablets by mouth 2 times daily 60 tablet 2     No current facility-administered medications for this visit.             ALLERGIES:  Allergies as of 03/05/2020    (No Known Allergies)       Symptoms of No Dysuria, Hematuria or Nocturia. No Urinary Incontinence or Vaginal Discharge  Musculoskeletal ROS: No Arthralgia, Arthritis,Gout,Osteoporosis or Rheumatism  Neurological ROS: No CVA, Migraines, Epilepsy, Seizure Hx, or Limb Weakness  Dermatological ROS: No Rash, Itching, Hives, Mole Changes or Cancer                                                                                                                                                                                                                                  PHYSICAL Exam:     Constitutional:  Vitals:    03/05/20 0858   BP: 131/83   Site: Right Upper Arm   Position: Sitting   Cuff Size: Large Adult   Pulse: 62   Weight: 213 lb 8 oz (96.8 kg)   Height: 5' 1\" (1.549 m)         General Appearance: This  is a well Developed, well Nourished, well groomed female. Her BMI was reviewed. Nutritional decision making was discussed. Skin:  There was a Normal Inspection of the skin without rashes or lesions. There were no rashes. (Papular, Maculopapular, Hives, Pustular, Macular)     There were no lesions (Ulcers, Erythema, Abn. Appearing Nevi)            Lymphatic:  No Lymph Nodes were Palpable in the neck , axilla or groin.  0 # Of Lymph Nodes; Location ; Character [Normal]  [Shotty] [Tender] [Enlarged]     Neck and EENT:  The neck was supple. There were no masses   The thyroid was not enlarged and had no masses. Perrla, EOMI B/L, TMI B/L No Abnormalities. Throat inspected-No exudates or Masses, Nares Patent No Masses        Respiratory: The lungs were auscultated and found to be clear. There were no rales, rhonchi or wheezes. There was a good respiratory effort. Cardiovascular: The heart was in a regular rate and rhythm. . No S3 or S4. There was no murmur appreciated. Location, grade, and radiation are not applicable. Extremities: The patients extremities were without calf tenderness, edema, or varicosities.   There was full range of

## 2020-03-06 LAB
DIRECT EXAM: ABNORMAL
Lab: ABNORMAL
SPECIMEN DESCRIPTION: ABNORMAL

## 2020-03-06 RX ORDER — METRONIDAZOLE 500 MG/1
500 TABLET ORAL 2 TIMES DAILY
Qty: 14 TABLET | Refills: 0 | Status: SHIPPED | OUTPATIENT
Start: 2020-03-06 | End: 2020-03-13

## 2020-03-07 LAB
HPV SAMPLE: NORMAL
HPV, GENOTYPE 16: NOT DETECTED
HPV, GENOTYPE 18: NOT DETECTED
HPV, HIGH RISK OTHER: NOT DETECTED
HPV, INTERPRETATION: NORMAL
SPECIMEN DESCRIPTION: NORMAL

## 2020-03-09 LAB
C TRACH DNA GENITAL QL NAA+PROBE: NEGATIVE
N. GONORRHOEAE DNA: NEGATIVE
SPECIMEN DESCRIPTION: NORMAL

## 2020-03-13 LAB — CYTOLOGY REPORT: NORMAL

## 2020-03-16 RX ORDER — PHENTERMINE HYDROCHLORIDE 37.5 MG/1
37.5 TABLET ORAL
Qty: 30 TABLET | Refills: 0 | Status: SHIPPED | OUTPATIENT
Start: 2020-03-16 | End: 2020-04-15

## 2020-05-13 ENCOUNTER — ANCILLARY PROCEDURE (OUTPATIENT)
Dept: OBGYN | Age: 30
End: 2020-05-13
Payer: MEDICAID

## 2020-05-13 PROCEDURE — 76830 TRANSVAGINAL US NON-OB: CPT | Performed by: RADIOLOGY

## 2020-05-13 PROCEDURE — 76856 US EXAM PELVIC COMPLETE: CPT | Performed by: RADIOLOGY

## 2020-05-15 ENCOUNTER — HOSPITAL ENCOUNTER (OUTPATIENT)
Age: 30
Discharge: HOME | End: 2020-05-15
Payer: MEDICAID

## 2020-05-15 LAB
ABSOLUTE EOS #: 0.29 K/UL (ref 0–0.44)
ABSOLUTE IMMATURE GRANULOCYTE: 0.03 K/UL (ref 0–0.3)
ABSOLUTE LYMPH #: 2.37 K/UL (ref 1.1–3.7)
ABSOLUTE MONO #: 0.57 K/UL (ref 0.1–1.2)
BASOPHILS # BLD: 1 % (ref 0–2)
BASOPHILS ABSOLUTE: 0.05 K/UL (ref 0–0.2)
DIFFERENTIAL TYPE: NORMAL
EOSINOPHILS RELATIVE PERCENT: 3 % (ref 1–4)
ESTIMATED AVERAGE GLUCOSE: 91 MG/DL
GLUCOSE FASTING: 100 MG/DL (ref 70–99)
HBA1C MFR BLD: 4.8 % (ref 4–6)
HCG QUANTITATIVE: <1 IU/L
HCT VFR BLD CALC: 43.7 % (ref 36.3–47.1)
HEMOGLOBIN: 14.3 G/DL (ref 11.9–15.1)
IMMATURE GRANULOCYTES: 0 %
INSULIN COMMENT: NORMAL
INSULIN REFERENCE RANGE:: NORMAL
INSULIN: 31.2 MU/L
LYMPHOCYTES # BLD: 25 % (ref 24–43)
MCH RBC QN AUTO: 28.5 PG (ref 25.2–33.5)
MCHC RBC AUTO-ENTMCNC: 32.7 G/DL (ref 28.4–34.8)
MCV RBC AUTO: 87.2 FL (ref 82.6–102.9)
MONOCYTES # BLD: 6 % (ref 3–12)
NRBC AUTOMATED: 0 PER 100 WBC
PDW BLD-RTO: 12.5 % (ref 11.8–14.4)
PLATELET # BLD: 249 K/UL (ref 138–453)
PLATELET ESTIMATE: NORMAL
PMV BLD AUTO: 11.6 FL (ref 8.1–13.5)
PROLACTIN: 16.41 UG/L (ref 4.79–23.3)
RBC # BLD: 5.01 M/UL (ref 3.95–5.11)
RBC # BLD: NORMAL 10*6/UL
SEG NEUTROPHILS: 65 % (ref 36–65)
SEGMENTED NEUTROPHILS ABSOLUTE COUNT: 6.22 K/UL (ref 1.5–8.1)
TSH SERPL DL<=0.05 MIU/L-ACNC: 1.43 MIU/L (ref 0.3–5)
WBC # BLD: 9.5 K/UL (ref 3.5–11.3)
WBC # BLD: NORMAL 10*3/UL

## 2020-05-15 PROCEDURE — 82947 ASSAY GLUCOSE BLOOD QUANT: CPT

## 2020-05-15 PROCEDURE — 36415 COLL VENOUS BLD VENIPUNCTURE: CPT

## 2020-05-15 PROCEDURE — 83525 ASSAY OF INSULIN: CPT

## 2020-05-15 PROCEDURE — 84443 ASSAY THYROID STIM HORMONE: CPT

## 2020-05-15 PROCEDURE — 83036 HEMOGLOBIN GLYCOSYLATED A1C: CPT

## 2020-05-15 PROCEDURE — 84702 CHORIONIC GONADOTROPIN TEST: CPT

## 2020-05-15 PROCEDURE — 84146 ASSAY OF PROLACTIN: CPT

## 2020-05-15 PROCEDURE — 85025 COMPLETE CBC W/AUTO DIFF WBC: CPT

## 2020-07-01 ENCOUNTER — OFFICE VISIT (OUTPATIENT)
Dept: OBGYN | Age: 30
End: 2020-07-01
Payer: MEDICAID

## 2020-07-01 VITALS
TEMPERATURE: 97.9 F | BODY MASS INDEX: 39.3 KG/M2 | SYSTOLIC BLOOD PRESSURE: 137 MMHG | WEIGHT: 208 LBS | HEART RATE: 76 BPM | DIASTOLIC BLOOD PRESSURE: 98 MMHG

## 2020-07-01 PROCEDURE — 4004F PT TOBACCO SCREEN RCVD TLK: CPT | Performed by: OBSTETRICS & GYNECOLOGY

## 2020-07-01 PROCEDURE — 99213 OFFICE O/P EST LOW 20 MIN: CPT | Performed by: OBSTETRICS & GYNECOLOGY

## 2020-07-01 PROCEDURE — G8427 DOCREV CUR MEDS BY ELIG CLIN: HCPCS | Performed by: OBSTETRICS & GYNECOLOGY

## 2020-07-01 PROCEDURE — 99212 OFFICE O/P EST SF 10 MIN: CPT

## 2020-07-01 PROCEDURE — G8417 CALC BMI ABV UP PARAM F/U: HCPCS | Performed by: OBSTETRICS & GYNECOLOGY

## 2020-07-01 RX ORDER — .BETA.-CAROTENE, ASCORBIC ACID, CHOLECALCIFEROL, .ALPHA.-TOCOPHEROL ACETATE, DL-, THIAMINE MONONITRATE, RIBOFLAVIN, NIACINAMIDE, PYRIDOXINE HYDROCHLORIDE, FOLIC ACID, CYANOCOBALAMIN, CALCIUM PANTOTHENATE, CALCIUM CARBONATE, FERROUS FUMARATE, ZINC OXIDE, AND DOCUSATE SODIUM 1000; 100; 400; 30; 3; 3; 15; 20; 1; 12; 7; 200; 29; 20; 25 [IU]/1; MG/1; [IU]/1; [IU]/1; MG/1; MG/1; MG/1; MG/1; MG/1; UG/1; MG/1; MG/1; MG/1; MG/1; MG/1
1 TABLET, COATED ORAL DAILY
Qty: 30 TABLET | Refills: 11 | Status: SHIPPED | OUTPATIENT
Start: 2020-07-01

## 2020-07-01 NOTE — PROGRESS NOTES
Elizabeth Taylor  2020    YOB: 1990          The patient was seen today. She is here regarding follow up on AUB . Her bowels are regular and she is voiding without difficulty. HPI:  Elizabeth Taylor is a 26 y.o. female      Patient here for follow up on her irregular periods. Patient had a period in May, but no period in 2020. Period in May lasted 4 days, light flow, and she had severe cramping. She states her periods are usually light, just the cramping is the bad part. Patient still desires pregnancy as well. We discussed the results of her ultrasound and lab work. Pt's US was essentially unremarkable, it did show a possible septum or bicornuate uterus, but pt has been pregnant before, so I am less concerned about this preventing pregnancy. Patient's lab work was unremarkable as well. Her TSH, prolactin, CBC and HgbA1C were normal.  Her fasting glucose to insulin ratio was significantly decreased. I explained to patient that the insulin level may be effecting her ability to release her eggs once a month. We discussed starting metformin. She is aware of the potential side effects of metformin, but she is willing to proceed with trying it to regulate her periods. We will start with a low dose and titrate up as needed. We also discussed smoking cessation and weight loss. Smoking cessation because of pt's general health and her desire to conceive. Weight loss because of her desire to conceive as well, explained that even a 10% weight loss can help regulate her cycles better. Will start pt on PNV as well. Once pt's cycles are regular, if pt has not conceived after a year, will need further workup for infertility. Pt's BP elevated today, she did smoke a cigarette right before arriving to clinic. She denies any chest pain, changes in vision, numbness/tingling, headache. Encouraged her to go to ER if these occur or any other s/s of MI/stroke. Encouraged her to stop smoking, and to follow up with her PCP. OB History    Para Term  AB Living   1 1 0 1 0 1   SAB TAB Ectopic Molar Multiple Live Births   0 0 0 0 0 1      # Outcome Date GA Lbr Joe/2nd Weight Sex Delivery Anes PTL Lv   1  / 28w0d  2 lb 11 oz (1.219 kg) F CS-Unspec  Y RENEE      Obstetric Comments   G1- Pt states she had a \"low placenta\" and she began to contract and bleed.   [facility].        Past Medical History:   Diagnosis Date    HSV        Past Surgical History:   Procedure Laterality Date     SECTION         Family History   Problem Relation Age of Onset    Breast Cancer Mother 48        breast ca    High Blood Pressure Mother     Breast Cancer Father         skin ca    Heart Disease Father         heart transplant    Diabetes Father        Social History     Socioeconomic History    Marital status: Single     Spouse name: Not on file    Number of children: Not on file    Years of education: Not on file    Highest education level: Not on file   Occupational History    Not on file   Social Needs    Financial resource strain: Not on file    Food insecurity     Worry: Not on file     Inability: Not on file    Transportation needs     Medical: Not on file     Non-medical: Not on file   Tobacco Use    Smoking status: Current Every Day Smoker     Packs/day: 0.50     Years: 10.00     Pack years: 5.00     Types: Cigarettes    Smokeless tobacco: Never Used   Substance and Sexual Activity    Alcohol use: Yes     Comment: socially    Drug use: No    Sexual activity: Not on file   Lifestyle    Physical activity     Days per week: Not on file     Minutes per session: Not on file    Stress: Not on file   Relationships    Social connections     Talks on phone: Not on file     Gets together: Not on file     Attends Bahai service: Not on file     Active member of club or organization: Not on file     Attends meetings of clubs or organizations: Not on file     Relationship status: Not on file    Intimate partner violence     Fear of current or ex partner: Not on file     Emotionally abused: Not on file     Physically abused: Not on file     Forced sexual activity: Not on file   Other Topics Concern    Not on file   Social History Narrative    Not on file         MEDICATIONS:  Current Outpatient Medications   Medication Sig Dispense Refill    metFORMIN (GLUCOPHAGE) 500 MG tablet Take 1 tablet by mouth 2 times daily (with meals) 180 tablet 1    Prenatal Vit-DSS-Fe Fum-FA (PRENATAL 19) 29-1 MG TABS Take 1 tablet by mouth daily 30 tablet 11    valACYclovir (VALTREX) 500 MG tablet Take 2 tablets by mouth 2 times daily (Patient not taking: Reported on 7/1/2020) 60 tablet 2     No current facility-administered medications for this visit. ALLERGIES:  Allergies as of 07/01/2020    (No Known Allergies)         REVIEW OF SYSTEMS:       A minimum of an eleven point review of systems was completed. Review Of Systems (11 point):  Constitutional: No fever, chills or malaise; No weight change or fatigue  Head and Eyes: No vision, Headache, Dizziness or trauma in last 12 months  ENT ROS: No hearing, Tinnitis, sinus or taste problems  Hematological and Lymphatic ROS:No Lymphoma, Von Willebrand's, Hemophillia or Bleeding History  Psych ROS: No Depression, Homicidal thoughts,suicidal thoughts, or anxiety  Breast ROS: No prior breast abnormalities or lumps  Respiratory ROS: No SOB, Pneumoniae,Cough, or Pulmonary Embolism History  Cardiovascular ROS: No Chest Pain with Exertion, Palpitations, Syncope, Edema, Arrhythmia  Gastrointestinal ROS: No Indigestion, Heartburn, Nausea, vomiting, Diarrhea, Constipation,or Bowel Changes; No Bloody Stools or melena  Genito-Urinary ROS: No Dysuria, Hematuria or Nocturia.  No Urinary Incontinence or Vaginal Discharge  Musculoskeletal ROS: No Arthralgia, Arthritis,Gout,Osteoporosis or Rheumatism  Neurological ROS: No CVA, Migraines, Epilepsy, Seizure Hx, or Limb Weakness  Dermatological ROS: No Rash, Itching, Hives, Mole Changes or Cancer          Blood pressure (!) 145/105, pulse 76, temperature 97.9 °F (36.6 °C), temperature source Temporal, weight 208 lb (94.3 kg), last menstrual period 05/30/2020, not currently breastfeeding. Abdomen: Soft non-tender; good bowel sounds. No guarding, rebound or rigidity. No CVA tenderness bilaterally. Extremities: No calf tenderness, DTR 2/4, and No edema bilaterally        Diagnostics:  EXAMINATION:   PELVIC ULTRASOUND       5/13/2020       TECHNIQUE:   Transabdominal and transvaginal imaging was obtained.       COMPARISON:   07/28/2015       HISTORY:   ORDERING SYSTEM PROVIDED HISTORY: Irregular periods   TECHNOLOGIST PROVIDED HISTORY:   irregular periods   Reason for Exam: Irregular periods, dysmenorrhea, family history of   endometriosis.       FINDINGS:       Measurements:       Uterus:  8.0 x 5.5 x 4.4 cm       Endometrial stripe:  7.2 mm       Right Ovary:  3.0 x 2.4 x 2.6 cm       Left Ovary:  3.7 x 2.7 x 2.2 cm           Ultrasound Findings:       Uterus: Uterus is mildly heterogeneous in its echotexture.  Question mild   bicornuate or septated appearance of the distal aspect of the endometrium.       Endometrial stripe: Endometrial stripe is within normal limits.       Right Ovary: Right ovary is within normal limits.  There is normal color flow.       Left Ovary:  Left ovary is within normal limits. There is normal color flow.       Free Fluid: No evidence of free fluid.           Impression   Endometrium grossly unremarkable in appearance.  Question mild septated or   bicornuate appearance of the uterus.       Ovaries appear grossly unremarkable in appearance.          Lab Results:  Results for orders placed or performed during the hospital encounter of 05/15/20   Hemoglobin A1C   Result Value Ref Range    Hemoglobin A1C 4.8 4.0 - 6.0 % Estimated Avg Glucose 91 mg/dL   Glucose, Fasting   Result Value Ref Range    Glucose, Fasting 100 (H) 70 - 99 mg/dL   Insulin, total   Result Value Ref Range    Insulin Comment fasting     Insulin 31.2 mU/L    Insulin Reference Range:         Prolactin   Result Value Ref Range    Prolactin 16.41 4.79 - 23.30 ug/L   HCG, Quantitative, Pregnancy   Result Value Ref Range    hCG Quant <1 <5 IU/L   TSH with Reflex   Result Value Ref Range    TSH 1.43 0.30 - 5.00 mIU/L   CBC Auto Differential   Result Value Ref Range    WBC 9.5 3.5 - 11.3 k/uL    RBC 5.01 3.95 - 5.11 m/uL    Hemoglobin 14.3 11.9 - 15.1 g/dL    Hematocrit 43.7 36.3 - 47.1 %    MCV 87.2 82.6 - 102.9 fL    MCH 28.5 25.2 - 33.5 pg    MCHC 32.7 28.4 - 34.8 g/dL    RDW 12.5 11.8 - 14.4 %    Platelets 533 449 - 797 k/uL    MPV 11.6 8.1 - 13.5 fL    NRBC Automated 0.0 0.0 per 100 WBC    Differential Type NOT REPORTED     Seg Neutrophils 65 36 - 65 %    Lymphocytes 25 24 - 43 %    Monocytes 6 3 - 12 %    Eosinophils % 3 1 - 4 %    Basophils 1 0 - 2 %    Immature Granulocytes 0 0 %    Segs Absolute 6.22 1.50 - 8.10 k/uL    Absolute Lymph # 2.37 1.10 - 3.70 k/uL    Absolute Mono # 0.57 0.10 - 1.20 k/uL    Absolute Eos # 0.29 0.00 - 0.44 k/uL    Basophils Absolute 0.05 0.00 - 0.20 k/uL    Absolute Immature Granulocyte 0.03 0.00 - 0.30 k/uL    WBC Morphology NOT REPORTED     RBC Morphology NOT REPORTED     Platelet Estimate NOT REPORTED          Assessment:   Diagnosis Orders   1. Abnormal uterine bleeding (AUB)     2.  Insulin resistance       Patient Active Problem List    Diagnosis Date Noted    Class 2 obesity due to excess calories without serious comorbidity with body mass index (BMI) of 38.0 to 38.9 in adult 03/22/2018    Herpes genitalia 03/29/2017     Diagnosed 2/2017             PLAN:  Will start metformin 500mg BID  Gave Rx for PNV  Encouraged smoking cessation and weight loss  Once pt's cycles are regular, if pt has not conceived after a year, will need further workup for infertility. Return in about 3 months (around 10/1/2020) for follow up on AUB. Repeat Annual every 1 year  Cervical Cytology Evaluation begins at 24years old. If Negative Cytology, Follow-up screening per current guidelines. Counseled on preventative health maintenance follow-up. Patient was seen with total face to face time of 15 minutes. More than 50% of this visit was counseling and education regarding The primary encounter diagnosis was Abnormal uterine bleeding (AUB). A diagnosis of Insulin resistance was also pertinent to this visit. and Follow-up (Discuss irregular Menses)   as well as  counseling on preventative health maintenance follow-up.     Mecca Hanley DO

## 2020-07-31 ENCOUNTER — HOSPITAL ENCOUNTER (OUTPATIENT)
Facility: MEDICAL CENTER | Age: 30
End: 2020-07-31
Payer: MEDICAID

## 2020-09-02 ENCOUNTER — OFFICE VISIT (OUTPATIENT)
Dept: OBGYN | Age: 30
End: 2020-09-02
Payer: MEDICAID

## 2020-09-02 VITALS
HEART RATE: 80 BPM | BODY MASS INDEX: 38.92 KG/M2 | SYSTOLIC BLOOD PRESSURE: 111 MMHG | WEIGHT: 206 LBS | TEMPERATURE: 98 F | DIASTOLIC BLOOD PRESSURE: 82 MMHG

## 2020-09-02 PROCEDURE — G8417 CALC BMI ABV UP PARAM F/U: HCPCS | Performed by: OBSTETRICS & GYNECOLOGY

## 2020-09-02 PROCEDURE — 4004F PT TOBACCO SCREEN RCVD TLK: CPT | Performed by: OBSTETRICS & GYNECOLOGY

## 2020-09-02 PROCEDURE — G8427 DOCREV CUR MEDS BY ELIG CLIN: HCPCS | Performed by: OBSTETRICS & GYNECOLOGY

## 2020-09-02 PROCEDURE — 99213 OFFICE O/P EST LOW 20 MIN: CPT | Performed by: OBSTETRICS & GYNECOLOGY

## 2020-09-02 RX ORDER — PNV,CALCIUM 72/IRON/FOLIC ACID 27 MG-1 MG
TABLET ORAL
COMMUNITY
Start: 2020-07-01 | End: 2021-04-22 | Stop reason: ALTCHOICE

## 2020-09-02 NOTE — PROGRESS NOTES
Elizabeth Taylor  2020    YOB: 1990          The patient was seen today. She is here regarding follow up on her irregular periods. Her bowels are regular and she is voiding without difficulty. HPI:  Elizabeth Taylor is a 26 y.o. female      At last appt patient was started on Metformin 500mg BID to try to regulate her periods. Patient states she had a period , it lasted 4 days and had a moderate flow. She did not have a period in August.      So hopefully we are headed in the correct direction. Pt is not noticing any side effects with the metformin. We discussed increasing the dose of her metformin to 1000mg BID to see if this will help regulate her menses better. Pt aware side effects can increase, and she is willing to proceed. OB History    Para Term  AB Living   1 1 0 1 0 1   SAB TAB Ectopic Molar Multiple Live Births   0 0 0 0 0 1      # Outcome Date GA Lbr Joe/2nd Weight Sex Delivery Anes PTL Lv   1  08 28w0d  2 lb 11 oz (1.219 kg) F CS-Unspec  Y RENEE      Obstetric Comments   G1- Pt states she had a \"low placenta\" and she began to contract and bleed.   [facility].        Past Medical History:   Diagnosis Date    HSV        Past Surgical History:   Procedure Laterality Date     SECTION         Family History   Problem Relation Age of Onset    Breast Cancer Mother 48        breast ca    High Blood Pressure Mother     Breast Cancer Father         skin ca    Heart Disease Father         heart transplant    Diabetes Father        Social History     Socioeconomic History    Marital status: Single     Spouse name: Not on file    Number of children: Not on file    Years of education: Not on file    Highest education level: Not on file   Occupational History    Not on file   Social Needs    Financial resource strain: Not on file    Food insecurity     Worry: Not on file     Inability: Not on file   CPM Braxis months  ENT ROS: No hearing, Tinnitis, sinus or taste problems  Hematological and Lymphatic ROS:No Lymphoma, Von Willebrand's, Hemophillia or Bleeding History  Psych ROS: No Depression, Homicidal thoughts,suicidal thoughts, or anxiety  Breast ROS: No prior breast abnormalities or lumps  Respiratory ROS: No SOB, Pneumoniae,Cough, or Pulmonary Embolism History  Cardiovascular ROS: No Chest Pain with Exertion, Palpitations, Syncope, Edema, Arrhythmia  Gastrointestinal ROS: No Indigestion, Heartburn, Nausea, vomiting, Diarrhea, Constipation,or Bowel Changes; No Bloody Stools or melena  Genito-Urinary ROS:+irregular periods. No Dysuria, Hematuria or Nocturia. No Urinary Incontinence or Vaginal Discharge  Musculoskeletal ROS: No Arthralgia, Arthritis,Gout,Osteoporosis or Rheumatism  Neurological ROS: No CVA, Migraines, Epilepsy, Seizure Hx, or Limb Weakness  Dermatological ROS: No Rash, Itching, Hives, Mole Changes or Cancer          Blood pressure 111/82, pulse 80, temperature 98 °F (36.7 °C), temperature source Temporal, weight 206 lb (93.4 kg), last menstrual period 07/12/2020, not currently breastfeeding. Abdomen: Soft non-tender; good bowel sounds. No guarding, rebound or rigidity. No CVA tenderness bilaterally.     Extremities: No calf tenderness, DTR 2/4, and No edema bilaterally        Lab Results:  Results for orders placed or performed during the hospital encounter of 05/15/20   Hemoglobin A1C   Result Value Ref Range    Hemoglobin A1C 4.8 4.0 - 6.0 %    Estimated Avg Glucose 91 mg/dL   Glucose, Fasting   Result Value Ref Range    Glucose, Fasting 100 (H) 70 - 99 mg/dL   Insulin, total   Result Value Ref Range    Insulin Comment fasting     Insulin 31.2 mU/L    Insulin Reference Range:         Prolactin   Result Value Ref Range    Prolactin 16.41 4.79 - 23.30 ug/L   HCG, Quantitative, Pregnancy   Result Value Ref Range    hCG Quant <1 <5 IU/L   TSH with Reflex   Result Value Ref Range    TSH 1.43 0.30 - 5.00 mIU/L   CBC Auto Differential   Result Value Ref Range    WBC 9.5 3.5 - 11.3 k/uL    RBC 5.01 3.95 - 5.11 m/uL    Hemoglobin 14.3 11.9 - 15.1 g/dL    Hematocrit 43.7 36.3 - 47.1 %    MCV 87.2 82.6 - 102.9 fL    MCH 28.5 25.2 - 33.5 pg    MCHC 32.7 28.4 - 34.8 g/dL    RDW 12.5 11.8 - 14.4 %    Platelets 336 716 - 281 k/uL    MPV 11.6 8.1 - 13.5 fL    NRBC Automated 0.0 0.0 per 100 WBC    Differential Type NOT REPORTED     Seg Neutrophils 65 36 - 65 %    Lymphocytes 25 24 - 43 %    Monocytes 6 3 - 12 %    Eosinophils % 3 1 - 4 %    Basophils 1 0 - 2 %    Immature Granulocytes 0 0 %    Segs Absolute 6.22 1.50 - 8.10 k/uL    Absolute Lymph # 2.37 1.10 - 3.70 k/uL    Absolute Mono # 0.57 0.10 - 1.20 k/uL    Absolute Eos # 0.29 0.00 - 0.44 k/uL    Basophils Absolute 0.05 0.00 - 0.20 k/uL    Absolute Immature Granulocyte 0.03 0.00 - 0.30 k/uL    WBC Morphology NOT REPORTED     RBC Morphology NOT REPORTED     Platelet Estimate NOT REPORTED          Assessment:   Diagnosis Orders   1. Irregular periods  metFORMIN (GLUCOPHAGE) 1000 MG tablet   2. Insulin resistance  metFORMIN (GLUCOPHAGE) 1000 MG tablet     Patient Active Problem List    Diagnosis Date Noted    Class 2 obesity due to excess calories without serious comorbidity with body mass index (BMI) of 38.0 to 38.9 in adult 03/22/2018    Herpes genitalia 03/29/2017     Diagnosed 2/2017             PLAN:  Increased metformin dosage to 1000mg BID  Pt encouraged to continue taking her PNV  Discussed the menstrual cycle and when the fertile time of the month is, encouraged patient to keep track of menstrual cycle  Blood pressure was improved today compared to last visit  Still encouraged diet/exercise for weight loss. Return in about 3 months (around 12/2/2020) for follow up on her AUB. Repeat Annual every 1 year  Cervical Cytology Evaluation begins at 24years old. If Negative Cytology, Follow-up screening per current guidelines.    Counseled on preventative health maintenance follow-up. Patient was seen with total face to face time of 15 minutes. More than 50% of this visit was counseling and education regarding The primary encounter diagnosis was Irregular periods. A diagnosis of Insulin resistance was also pertinent to this visit. and Follow-up (f/u appt)   as well as  counseling on preventative health maintenance follow-up.     Moiz Vance DO

## 2020-10-29 ENCOUNTER — HOSPITAL ENCOUNTER (OUTPATIENT)
Facility: MEDICAL CENTER | Age: 30
End: 2020-10-29
Payer: MEDICAID

## 2020-11-05 ENCOUNTER — INITIAL CONSULT (OUTPATIENT)
Dept: ONCOLOGY | Age: 30
End: 2020-11-05
Payer: MEDICAID

## 2020-11-05 PROCEDURE — 96040 PR GENETIC COUNSELING, EACH 30 MIN: CPT | Performed by: GENETIC COUNSELOR, MS

## 2020-11-06 NOTE — PROGRESS NOTES
3 [facility]   Hereditary Cancer Risk Assessment     Name: Lori Hawkins   YOB: 1990   Date of Consultation: 20     Ms. Giovanni Avilez was seen at the [address] [place] [address] for genetic counseling on 20. Ms. Giovanni Avilez was referred by Dr. Ziyad Phillips due to her family history of breast cancer. PERSONAL HISTORY   Ms. Giovanni Avilez is a 26 y.o.  female with no personal history of cancer. Ms. Giovanni Avilez reports menarche at age 6, first child at age 19, and is premenopausal. Ms. Giovanni Avilez has never had a hysterectomy and both ovaries are intact. She has never taken hormone replacement therapy. FAMILY HISTORY  Ms. Giovanni Avilez has 0 son(s) and 1 daughter(s). She has 1 full sister(s) and 0 full brother(s). Ms. Janie Zamorano mother passed away at age 62 from metastatic breast cancer. She was originally diagnosed at age 48 and later diagnosed with a second primary breast cancer at around age 60. She has limited medical history regarding her extended maternal relatives. Ms. Janie Zamorano father is also . One of Ms. Boo's paternal aunts was diagnosed with breast cancer in her 46s. There are no other known cancers in her extended paternal family. Ms. Giovanni Avilez reports  ancestry and denies any known Ashkenazi [employer] heritage. RISK ASSESSMENT   We discussed that approximately 5-10% of cancers are due to a hereditary gene mutation which causes an increased risk for certain cancers. Hereditary cancers are typically diagnosed at younger ages (under age 46y) and occur in multiple generations of a family. Multiple individuals with the same type of cancer (example: breast or colorectal) or uncommon cancers (example: ovarian, pancreatic, male breast cancer) are also features of hereditary cancers. We discussed that Ms. Boo's maternal family history is somewhat concerning for a hereditary predisposition given that she has a close relative with early onset breast cancer. There is limited medical history information regarding her extended relatives which may limit our risk assessment. In summary, Ms. [address] (NCCN) guidelines for genetic testing of the BRCA1/2 genes based on having a first degree relative with multiple primary breast cancers with the first diagnosed at age 48. The NCCN guidelines also recognize that an individual's personal and/or family history may be explained by more than one inherited cancer syndrome. Thus, a multi-gene panel may be more efficient, more cost effecting, and increases the yield of detecting a hereditary mutation which would impact medical management. Given her personal and/or family history, we recommend testing for the following genes at minimum: BHARTI, CHEK2, NBN, and PALB2. DISCUSSION  We discussed that the BRCA1/2 genes are the most common genes associated with hereditary breast and ovarian cancer. We also discussed that genetic testing is available for multiple other genes related to hereditary cancer. Some of these genes are known to carry a significant increased risk for several cancers including colon, breast, uterine, ovarian, stomach, and pancreatic cancer, while some of these genes are believed to have a moderate increased risk for breast and other cancers. We discussed the possibility of finding a mutation in genes with limited information to guide medical management, as well we as the possibility of identifying variants of uncertain significance (VUS). We discussed the risks, benefits, and limitations of genetic testing. Possible test results were discussed as well as potential screening and prevention strategies. Specifically, we discussed increased breast cancer surveillance by mammogram and breast MRI as well as the option of prophylactic mastectomy.  We discussed the recommendation for prophylactic oophorectomy for results which suggest an increased risk for ovarian cancer. Lastly, we discussed that the results of Ms. Boo's genetic testing may be beneficial in defining her risk for cancer as well as for her family members. SUMMARY & PLAN  1) Ms. Nette Blair meets the NCCN criteria for genetic testing based on her maternal family history of early onset breast cancer. 2) Genetic testing for the BRCA1/2 genes along with a multi-gene panel was recommended and offered to Ms. Nette Blair. She elected to proceed with the CancerNext Expanded + RNA Insight gene panel. 3) Informed consent was obtained and a blood sample was sent to [facility]. We will call Ms. Nette Blair with results as soon as they are available. A follow up appointment may be recommended. A summary letter with results and final medical management recommendations will be sent once available. A total of 40 minutes were spent face to face with Ms. Nette Blair and 50% of the time was spent educating and counseling. The [address] [place] [address] National Program would be glad to offer our assistance should you have any questions or concerns about this information. Please feel free to contact us at [phone]. Iglesia Riddle.  Shelbi Martínez MS, [facility]   Licensed Genetic Counselor

## 2020-12-01 ENCOUNTER — TELEPHONE (OUTPATIENT)
Dept: OBGYN | Age: 30
End: 2020-12-01

## 2021-04-22 ENCOUNTER — OFFICE VISIT (OUTPATIENT)
Dept: INTERNAL MEDICINE | Age: 31
End: 2021-04-22
Payer: MEDICAID

## 2021-04-22 VITALS
BODY MASS INDEX: 40.02 KG/M2 | WEIGHT: 212 LBS | TEMPERATURE: 99.7 F | HEART RATE: 97 BPM | DIASTOLIC BLOOD PRESSURE: 89 MMHG | SYSTOLIC BLOOD PRESSURE: 126 MMHG | HEIGHT: 61 IN

## 2021-04-22 DIAGNOSIS — E66.09 CLASS 2 OBESITY DUE TO EXCESS CALORIES WITHOUT SERIOUS COMORBIDITY WITH BODY MASS INDEX (BMI) OF 38.0 TO 38.9 IN ADULT: Primary | ICD-10-CM

## 2021-04-22 DIAGNOSIS — E78.5 DYSLIPIDEMIA: ICD-10-CM

## 2021-04-22 DIAGNOSIS — F17.200 SMOKER: ICD-10-CM

## 2021-04-22 PROCEDURE — G8417 CALC BMI ABV UP PARAM F/U: HCPCS | Performed by: INTERNAL MEDICINE

## 2021-04-22 PROCEDURE — 4004F PT TOBACCO SCREEN RCVD TLK: CPT | Performed by: INTERNAL MEDICINE

## 2021-04-22 PROCEDURE — 99211 OFF/OP EST MAY X REQ PHY/QHP: CPT | Performed by: INTERNAL MEDICINE

## 2021-04-22 PROCEDURE — 99214 OFFICE O/P EST MOD 30 MIN: CPT | Performed by: INTERNAL MEDICINE

## 2021-04-22 PROCEDURE — G8427 DOCREV CUR MEDS BY ELIG CLIN: HCPCS | Performed by: INTERNAL MEDICINE

## 2021-04-22 RX ORDER — PHENTERMINE HYDROCHLORIDE 37.5 MG/1
37.5 TABLET ORAL
Qty: 30 TABLET | Refills: 0 | Status: SHIPPED | OUTPATIENT
Start: 2021-04-22 | End: 2021-06-18 | Stop reason: SDUPTHER

## 2021-04-22 SDOH — ECONOMIC STABILITY: TRANSPORTATION INSECURITY
IN THE PAST 12 MONTHS, HAS THE LACK OF TRANSPORTATION KEPT YOU FROM MEDICAL APPOINTMENTS OR FROM GETTING MEDICATIONS?: NO

## 2021-04-22 SDOH — ECONOMIC STABILITY: INCOME INSECURITY: HOW HARD IS IT FOR YOU TO PAY FOR THE VERY BASICS LIKE FOOD, HOUSING, MEDICAL CARE, AND HEATING?: NOT HARD AT ALL

## 2021-04-22 SDOH — ECONOMIC STABILITY: FOOD INSECURITY: WITHIN THE PAST 12 MONTHS, THE FOOD YOU BOUGHT JUST DIDN'T LAST AND YOU DIDN'T HAVE MONEY TO GET MORE.: NEVER TRUE

## 2021-04-22 SDOH — ECONOMIC STABILITY: FOOD INSECURITY: WITHIN THE PAST 12 MONTHS, YOU WORRIED THAT YOUR FOOD WOULD RUN OUT BEFORE YOU GOT MONEY TO BUY MORE.: NEVER TRUE

## 2021-04-22 SDOH — ECONOMIC STABILITY: TRANSPORTATION INSECURITY
IN THE PAST 12 MONTHS, HAS LACK OF TRANSPORTATION KEPT YOU FROM MEETINGS, WORK, OR FROM GETTING THINGS NEEDED FOR DAILY LIVING?: NO

## 2021-04-22 ASSESSMENT — PATIENT HEALTH QUESTIONNAIRE - PHQ9
9. THOUGHTS THAT YOU WOULD BE BETTER OFF DEAD, OR OF HURTING YOURSELF: 0
10. IF YOU CHECKED OFF ANY PROBLEMS, HOW DIFFICULT HAVE THESE PROBLEMS MADE IT FOR YOU TO DO YOUR WORK, TAKE CARE OF THINGS AT HOME, OR GET ALONG WITH OTHER PEOPLE: 0
2. FEELING DOWN, DEPRESSED OR HOPELESS: 0
1. LITTLE INTEREST OR PLEASURE IN DOING THINGS: 0
7. TROUBLE CONCENTRATING ON THINGS, SUCH AS READING THE NEWSPAPER OR WATCHING TELEVISION: 0
5. POOR APPETITE OR OVEREATING: 0
SUM OF ALL RESPONSES TO PHQ QUESTIONS 1-9: 0

## 2021-04-22 NOTE — PROGRESS NOTES
[facility]    Progress Note    Date of patient's visit: 2021    Patient's Name:  Severa Hatch    YOB: 1990            Patient Care Team:  J Luis Mooney MD as PCP - General (Internal Medicine)  J Luis Mooney MD as PCP - [facility] Empaneled Provider    REASON FOR VISIT: Routine outpatient follow     Chief Complaint   Patient presents with    Weight Management     pt states that she would like to discuss starting back on Adipex     Health Maintenance         HISTORY OF PRESENT ILLNESS:    History was obtained from the patient. Severa Hatch is a 33 y.o. is here for follow-up on her obesity. She has regained weight. Last year she tried phentermine for a month and had lost a lot of weight. Unfortunately she could not get the second prescription filled. But during the pandemic she could not get much chance to exercise. She has gained weight back. She is interested in going back on phentermine. She continues to smoke and is now smoking up to 1 pack/day. She shows no interest or motivation to quit smoking as she says she enjoys it too much. She has not had any headaches or blood pressure issues. She was noted to have elevated blood pressure one time last year when she was following up with GYN but then follow-up blood pressures have been okay. She wishes to try phentermine again. I did discuss other options including GLP-1 agonists to help with weight loss but right now she would like to go with oral medications.           Past Medical History:   Diagnosis Date    HSV        Past Surgical History:   Procedure Laterality Date     SECTION           ALLERGIES    No Known Allergies    MEDICATIONS:      Current Outpatient Medications on File Prior to Visit   Medication Sig Dispense Refill    Prenatal Vit-DSS-Fe Fum-FA (PRENATAL 19) 29-1 MG TABS Take 1 tablet by mouth daily 30 tablet 11    valACYclovir (VALTREX) 500 MG tablet Take 2 tablets by mouth 2 times daily 60 tablet 2    metFORMIN (GLUCOPHAGE) 1000 MG tablet Take 1 tablet by mouth 2 times daily (with meals) (Patient not taking: Reported on 4/22/2021) 60 tablet 3     No current facility-administered medications on file prior to visit. HISTORY    Reviewed and no change from previous record. ANNA  reports that she has been smoking cigarettes. She has a 5.00 pack-year smoking history. She has never used smokeless tobacco.    FAMILY HISTORY:    Reviewed and No change from previous visit    HEALTH MAINTENANCE DUE:      Health Maintenance Due   Topic Date Due    Pneumococcal 0-64 years Vaccine (1 of 1 - PPSV23) Never done    COVID-19 Vaccine (1) Never done       REVIEW OF SYSTEMS:    12 point review of symptoms completed and found to be normal except noted in the HPI    Review of Systems   Constitutional: Negative for fatigue and unexpected weight change. Eyes: Negative for photophobia and visual disturbance. Respiratory: Negative for cough, shortness of breath and wheezing. Cardiovascular: Negative for chest pain, palpitations and leg swelling. Gastrointestinal: Negative for abdominal pain and constipation. Genitourinary: Negative for dysuria and frequency. Musculoskeletal: Negative for arthralgias and back pain. Neurological: Negative for dizziness, seizures, syncope, weakness and headaches. Psychiatric/Behavioral: Negative for dysphoric mood and hallucinations. The patient is not nervous/anxious and is not hyperactive. PHYSICAL EXAM:     Vitals:    04/22/21 0923   BP: 126/89   Site: Left Upper Arm   Position: Sitting   Cuff Size: Large Adult   Pulse: 97   Temp: 99.7 °F (37.6 °C)   TempSrc: Infrared   Weight: 212 lb (96.2 kg)   Height: 5' 1\" (1.549 m)     Body mass index is 40.06 kg/m².     BP Readings from Last 3 Encounters:   04/22/21 126/89   09/02/20 111/82   07/01/20 (!) 137/98        Wt Readings from Last 3 Encounters:   04/22/21 212 lb (96.2 kg)   09/02/20 206 lb (93.4 kg)   07/01/20 208 lb (94.3 kg)       Physical Exam  Vitals signs and nursing note reviewed. Constitutional:       Appearance: She is obese. HENT:      Head: Normocephalic and atraumatic. Eyes:      General: No scleral icterus. Extraocular Movements: Extraocular movements intact. Conjunctiva/sclera: Conjunctivae normal.      Pupils: Pupils are equal, round, and reactive to light. Neck:      Musculoskeletal: Normal range of motion and neck supple. Cardiovascular:      Rate and Rhythm: Normal rate and regular rhythm. Pulmonary:      Effort: Pulmonary effort is normal.      Breath sounds: Normal breath sounds. Musculoskeletal:      Right lower leg: No edema. Left lower leg: No edema. Skin:     General: Skin is warm and dry. Neurological:      General: No focal deficit present. Mental Status: She is alert and oriented to person, place, and time. LABORATORY FINDINGS:    CBC:  Lab Results   Component Value Date    WBC 9.5 05/15/2020    HGB 14.3 05/15/2020     05/15/2020     BMP:    Lab Results   Component Value Date     12/05/2019    K 4.2 12/05/2019     12/05/2019    CO2 22 12/05/2019    BUN 13 12/05/2019    CREATININE 0.61 12/05/2019    GLUCOSE 94 12/05/2019     HEMOGLOBIN A1C:   Lab Results   Component Value Date    LABA1C 4.8 05/15/2020     MICROALBUMIN URINE: No results found for: MICROALBUR  FASTING LIPID PANEL:  Lab Results   Component Value Date    CHOL 237 (H) 12/05/2019    HDL 45 12/05/2019    TRIG 190 (H) 12/05/2019     Lab Results   Component Value Date    LDLCHOLESTEROL 154 (H) 12/05/2019       LIVER PROFILE:  Lab Results   Component Value Date    ALT 22 12/05/2019    AST 16 12/05/2019    PROT 7.0 12/05/2019    BILITOT 0.27 12/05/2019    LABALBU 4.4 12/05/2019      THYROID FUNCTION:   Lab Results   Component Value Date    TSH 1.43 05/15/2020      URINEANALYSIS: No results found for: LABURIN  ASSESSMENT AND PLAN:    1.  Class 2 obesity due to excess calories without serious comorbidity with body mass index (BMI) of 38.0 to 38.9 in adult  Discussed again in detail about side effects  Weight and BP check in 1 month  Call if any side effects  Take in am with breakfast  Exercise  Lifestyle changes    - phentermine (ADIPEX-P) 37.5 MG tablet; Take 1 tablet by mouth every morning (before breakfast) for 30 days. Dispense: 30 tablet; Refill: 0    2. Dyslipidemia    - Lipid Panel; Future    3. Smoker            FOLLOW UP AND INSTRUCTIONS:   Return in about 4 weeks (around 5/20/2021). 1. Angela Lawrence received counseling on the following healthy behaviors: nutrition, exercise and medication adherence    2. Reviewed prior labs and health maintenance. 3. Discussed use, benefit, and side effects of prescribed medications. Barriers to medication compliance addressed. All patient questions answered. Pt voiced understanding.      4. Patient given educational materials - see patient instructions    Khurram Cleveland  Attending Physician, [address] [place] [address], Internal Medicine Residency Program  [facility]  4/22/2021, 9:49 AM

## 2021-04-22 NOTE — PATIENT INSTRUCTIONS
Return To Clinic 5/21/2021 . After Visit Summary  given and reviewed. It is very important for your care that you keep your appointment. If for some reason you are unable to keep your appointment it is equally important that you call our office at [phone] to cancel your appointment and reschedule.  Failure to do so may result in your termination from our practice.     -Bloodwork orders given to patient, they will have them done before their next visit.     -Abhilash Sen

## 2021-04-28 ASSESSMENT — ENCOUNTER SYMPTOMS
BACK PAIN: 0
COUGH: 0
SHORTNESS OF BREATH: 0
ABDOMINAL PAIN: 0
PHOTOPHOBIA: 0
WHEEZING: 0
CONSTIPATION: 0

## 2021-06-03 ENCOUNTER — OFFICE VISIT (OUTPATIENT)
Dept: OBGYN | Age: 31
End: 2021-06-03
Payer: MEDICAID

## 2021-06-03 ENCOUNTER — HOSPITAL ENCOUNTER (OUTPATIENT)
Age: 31
Setting detail: SPECIMEN
Discharge: HOME | End: 2021-06-03
Payer: MEDICAID

## 2021-06-03 VITALS
HEART RATE: 81 BPM | WEIGHT: 204 LBS | SYSTOLIC BLOOD PRESSURE: 118 MMHG | DIASTOLIC BLOOD PRESSURE: 78 MMHG | BODY MASS INDEX: 38.55 KG/M2

## 2021-06-03 DIAGNOSIS — Z00.00 PREVENTATIVE HEALTH CARE: ICD-10-CM

## 2021-06-03 DIAGNOSIS — Z01.419 WELL WOMAN EXAM WITH ROUTINE GYNECOLOGICAL EXAM: Primary | ICD-10-CM

## 2021-06-03 PROCEDURE — 99395 PREV VISIT EST AGE 18-39: CPT | Performed by: OBSTETRICS & GYNECOLOGY

## 2021-06-03 PROCEDURE — 99211 OFF/OP EST MAY X REQ PHY/QHP: CPT | Performed by: OBSTETRICS & GYNECOLOGY

## 2021-06-03 PROCEDURE — 90651 9VHPV VACCINE 2/3 DOSE IM: CPT | Performed by: OBSTETRICS & GYNECOLOGY

## 2021-06-03 NOTE — PROGRESS NOTES
History and Physical    Esteban Moreno  6/3/2021              33 y.o. Chief Complaint   Patient presents with    Annual Exam     Annual Pap, Patient not sure       No LMP recorded. (Menstrual status: Irregular periods). Primary Care Physician: Avinash Vaughn MD    The patient was seen and examined. She has no chief complaint today and is here for her annual exam.  Her bowels are regular. There are no voiding complaints. She denies any bloating. She denies vaginal discharge and was counseled on STD's and the need for barrier contraception. HPI : Esteban Moreno is a 33 y.o. female     Pt here for annual exam.    She stopped taking the metformin and PNV about 3 months ago. Pt is not currently trying to conceive. Even though she stopped her metformin, her periods are now still occurring once a month, which is an improvement from last year. Pt is not on any birth control at this time. ________________________________________________________________________  OB History    Para Term  AB Living   1 1 0 1 0 1   SAB TAB Ectopic Molar Multiple Live Births   0 0 0 0 0 1      # Outcome Date GA Lbr Joe/2nd Weight Sex Delivery Anes PTL Lv   1  08 28w0d  2 lb 11 oz (1.219 kg) F CS-Unspec  Y RENEE      Obstetric Comments   G1- Pt states she had a \"low placenta\" and she began to contract and bleed.   [facility].      Past Medical History:   Diagnosis Date    HSV                                                                    Past Surgical History:   Procedure Laterality Date     SECTION       Family History   Problem Relation Age of Onset    Breast Cancer Mother 48        breast ca    High Blood Pressure Mother     Breast Cancer Father         skin ca    Heart Disease Father         heart transplant    Diabetes Father      Social History     Socioeconomic History    Marital status: Single     Spouse name: Not on file    Number of children: Not on file  Years of education: Not on file    Highest education level: Not on file   Occupational History    Not on file   Tobacco Use    Smoking status: Current Every Day Smoker     Packs/day: 0.50     Years: 10.00     Pack years: 5.00     Types: Cigarettes    Smokeless tobacco: Never Used   Vaping Use    Vaping Use: Never used   Substance and Sexual Activity    Alcohol use: Yes     Comment: socially    Drug use: No    Sexual activity: Not on file   Other Topics Concern    Not on file   Social History Narrative    Not on file     Social Determinants of Health     Financial Resource Strain: Low Risk     Difficulty of Paying Living Expenses: Not hard at all   Food Insecurity: No Food Insecurity    Worried About Running Out of Food in the Last Year: Never true    Ольга of Food in the Last Year: Never true   Transportation Needs: No Transportation Needs    Lack of Transportation (Medical): No    Lack of Transportation (Non-Medical):  No   Physical Activity:     Days of Exercise per Week:     Minutes of Exercise per Session:    Stress:     Feeling of Stress :    Social Connections:     Frequency of Communication with Friends and Family:     Frequency of Social Gatherings with Friends and Family:     Attends Mu-ism Services:     Active Member of Clubs or Organizations:     Attends Club or Organization Meetings:     Marital Status:    Intimate Partner Violence:     Fear of Current or Ex-Partner:     Emotionally Abused:     Physically Abused:     Sexually Abused:        MEDICATIONS:  Current Outpatient Medications   Medication Sig Dispense Refill    valACYclovir (VALTREX) 500 MG tablet Take 2 tablets by mouth 2 times daily 60 tablet 2    metFORMIN (GLUCOPHAGE) 1000 MG tablet Take 1 tablet by mouth 2 times daily (with meals) (Patient not taking: Reported on 4/22/2021) 60 tablet 3    Prenatal Vit-DSS-Fe Fum-FA (PRENATAL 19) 29-1 MG TABS Take 1 tablet by mouth daily (Patient not taking: Reported on breast abnormalities or lumps  Respiratory ROS: No SOB, Pneumoniae,Cough, or Pulmonary Embolism History  Cardiovascular ROS: No Chest Pain with Exertion, Palpitations, Syncope, Edema, Arrhythmia  Gastrointestinal ROS: No Indigestion, Heartburn, Nausea, vomiting, Diarrhea, Constipation,or Bowel Changes; No Bloody Stools or melena  Genito-Urinary ROS: No Dysuria, Hematuria or Nocturia. No Urinary Incontinence or Vaginal Discharge  Musculoskeletal ROS: No Arthralgia, Arthritis,Gout,Osteoporosis or Rheumatism  Neurological ROS: No CVA, Migraines, Epilepsy, Seizure Hx, or Limb Weakness  Dermatological ROS: No Rash, Itching, Hives, Mole Changes or Cancer                                                                                                                                                                                                                                  PHYSICAL Exam:     Constitutional:  Vitals:    06/03/21 0844   BP: 118/78   Pulse: 81   Weight: 204 lb (92.5 kg)       Chaperone for Intimate Exam   Chaperone was offered and accepted as part of the rooming process.  Chaperone: Dianna Tubbs MA          General Appearance: This  is a well Developed, well Nourished, well groomed female. Her BMI was reviewed. Nutritional decision making was discussed. Skin:  There was a Normal Inspection of the skin without rashes or lesions. There were no rashes. (Papular, Maculopapular, Hives, Pustular, Macular)     There were no lesions (Ulcers, Erythema, Abn. Appearing Nevi)            Lymphatic:  No Lymph Nodes were Palpable in the neck , axilla or groin.  0 # Of Lymph Nodes; Location ; Character [Normal]  [Shotty] [Tender] [Enlarged]     Neck and EENT:  The neck was supple. There were no masses   The thyroid was not enlarged and had no masses. Perrla, EOMI B/L, TMI B/L No Abnormalities. Throat inspected-No exudates or Masses, Nares Patent No Masses        Respiratory:   The lungs were auscultated and found to be clear. There were no rales, rhonchi or wheezes. There was a good respiratory effort. Cardiovascular: The heart was in a regular rate and rhythm. . No S3 or S4. There was no murmur appreciated. Location, grade, and radiation are not applicable. Extremities: The patients extremities were without calf tenderness, edema, or varicosities. There was full range of motion in all four extremities. Pulses in all four extremities were appreciated and are 2/4. Abdomen: The abdomen was soft and non-tender. There were good bowel sounds in all quadrants and there was no guarding, rebound or rigidity. On evaluation there was no evidence of hepatosplenomegaly and there was no costal vertebral stephan tenderness bilaterally. No hernias were appreciated. Abdominal Scars: pfannenstiel from csection    Psych: The patient had a normal Orientation to: Time, Place, Person, and Situation  There is no Mood / Affect changes    Breast:  (Chest)  normal appearance, no masses or tenderness, Inspection negative, No nipple retraction or dimpling, No nipple discharge or bleeding, No axillary or supraclavicular adenopathy, Normal to palpation without dominant masses, Taught monthly breast self examination  Self breast exams were reviewed in detail. Literature was given. Pelvic Exam:  External genitalia: normal general appearance  Urinary system: urethral meatus normal  Vaginal: normal mucosa without prolapse or lesions, normal rugae and vaginitis probe obtained  Cervix: normal appearance and GC prep obtained  Adnexa: normal bimanual exam  Uterus: normal single, nontender, anteverted and mid-position  Negative bladder sweep, no lymphadenopathy, negative CMT        Musculosk:  Normal Gait and station was noted. Digits were evaluated without abnormal findings. Range of motion, stability and strength were evaluated and found to be appropriate for the patients age. OMM Structural Component:   The respect to children and newborns. Increases in Sudden Infant Death Syndrome, asthma, respiratory problems, and cancers were reviewed. PLAN:  Next pap due 3/2025  Vaginal cultures collected - will treat if appropriate  Pt has completed one of the 3 gardasil vaccines - pt willing to get 2nd dose today and then be scheduled for final dose  Return in about 1 year (around 6/3/2022) for Annual Exam.  Repeat Annual every 1 year  Cervical Cytology Evaluation begins at 24years old. If Negative Cytology, Follow-up screening per current guidelines. Mammograms every 1 year. If 37 yo and last mammogram was negative. Calcium and Vitamin D dosing reviewed. Colonoscopy screening reviewed as well as onset for bone density testing. Birth control and barrier recommendations discussed. STD counseling and prevention reviewed. Gardisil counseling completed for all patients 10-37 yo. Routine health maintenance per patients PCP. The patient, Soledad Nieves is a 33 y.o. female, was seen with a total time spent of 20 minutes for the visit on this date of service by the E/M provider. The time component had both face to face and non face to face time spent in determining the total time component. Counseling and education regarding her diagnosis listed below and her options regarding those diagnoses were also included in determining her time component. Diagnosis Orders   1. Well woman exam with routine gynecological exam  Vaginitis DNA Probe    Chlamydia Trachomatis & Neisseria gonorrhoeae (GC) by amplified detection   2. Preventative health care  HPV vaccine 9-valent IM (GARDASIL 9)        The patient had her preventative health maintenance recommendations and follow-up reviewed with her at the completion of her visit.     Jina Babcock DO

## 2021-06-04 DIAGNOSIS — Z01.419 WELL WOMAN EXAM WITH ROUTINE GYNECOLOGICAL EXAM: ICD-10-CM

## 2021-06-04 LAB
DIRECT EXAM: ABNORMAL
Lab: ABNORMAL
SPECIMEN DESCRIPTION: ABNORMAL

## 2021-06-07 RX ORDER — METRONIDAZOLE 500 MG/1
500 TABLET ORAL 2 TIMES DAILY
Qty: 14 TABLET | Refills: 0 | Status: SHIPPED | OUTPATIENT
Start: 2021-06-07 | End: 2021-06-14

## 2021-06-18 ENCOUNTER — OFFICE VISIT (OUTPATIENT)
Dept: INTERNAL MEDICINE | Age: 31
End: 2021-06-18
Payer: MEDICAID

## 2021-06-18 VITALS
BODY MASS INDEX: 38.51 KG/M2 | WEIGHT: 204 LBS | HEART RATE: 92 BPM | SYSTOLIC BLOOD PRESSURE: 124 MMHG | DIASTOLIC BLOOD PRESSURE: 86 MMHG | HEIGHT: 61 IN

## 2021-06-18 DIAGNOSIS — N92.6 IRREGULAR MENSTRUAL BLEEDING: ICD-10-CM

## 2021-06-18 DIAGNOSIS — E66.09 CLASS 2 OBESITY DUE TO EXCESS CALORIES WITHOUT SERIOUS COMORBIDITY WITH BODY MASS INDEX (BMI) OF 38.0 TO 38.9 IN ADULT: Primary | ICD-10-CM

## 2021-06-18 LAB
CONTROL: NORMAL
PREGNANCY TEST URINE, POC: NEGATIVE

## 2021-06-18 PROCEDURE — 4004F PT TOBACCO SCREEN RCVD TLK: CPT | Performed by: INTERNAL MEDICINE

## 2021-06-18 PROCEDURE — 99213 OFFICE O/P EST LOW 20 MIN: CPT | Performed by: INTERNAL MEDICINE

## 2021-06-18 PROCEDURE — 99211 OFF/OP EST MAY X REQ PHY/QHP: CPT | Performed by: INTERNAL MEDICINE

## 2021-06-18 PROCEDURE — 81025 URINE PREGNANCY TEST: CPT | Performed by: INTERNAL MEDICINE

## 2021-06-18 PROCEDURE — G8417 CALC BMI ABV UP PARAM F/U: HCPCS | Performed by: INTERNAL MEDICINE

## 2021-06-18 PROCEDURE — G8427 DOCREV CUR MEDS BY ELIG CLIN: HCPCS | Performed by: INTERNAL MEDICINE

## 2021-06-18 RX ORDER — PHENTERMINE HYDROCHLORIDE 37.5 MG/1
37.5 TABLET ORAL
Qty: 30 TABLET | Refills: 0 | Status: SHIPPED | OUTPATIENT
Start: 2021-06-18 | End: 2021-06-21 | Stop reason: SDUPTHER

## 2021-06-18 ASSESSMENT — ENCOUNTER SYMPTOMS
PHOTOPHOBIA: 0
SHORTNESS OF BREATH: 0
COUGH: 0
WHEEZING: 0

## 2021-06-18 NOTE — PATIENT INSTRUCTIONS
Medications e-scribe to pharmacy of pt's choice. Return To Clinic 7/13/2021 at 10:45 am. After Visit Summary  given and reviewed. It is very important for your care that you keep your appointment. If for some reason you are unable to keep your appointment it is equally important that you call our office at [phone] to cancel your appointment and reschedule. Failure to do so may result in your termination from our practice.     SL

## 2021-06-18 NOTE — PROGRESS NOTES
[facility] [place], [place]/[facility]    Progress Note    Date of patient's visit: 2021    Patient's Name:  J Luis Sanchez    YOB: 1990            Patient Care Team:  Melissa Brantley MD as PCP - General (Internal Medicine)  Melissa Brantley MD as PCP - [facility]    REASON FOR VISIT: Routine outpatient follow     Chief Complaint   Patient presents with    Weight Loss     1 month f/u   [facility] Maintenance     pt declined vaccination         HISTORY OF PRESENT ILLNESS:    History was obtained from the patient. J Luis Sanchez is a 33 y.o. is here for follow-up. She is here 6 weeks after starting Adipex. She has been out of Adipex for 2 weeks. She started it in end of April and did well and lost about 8 pounds. Because of work issues she was not able to come in sooner. She denies any side effects. No difficulty with sleep. She has been drinking a lot of water. She is 1 changing her diet. She has not been able to go to the gym to exercise. She would like to continue Adipex. She has irregular menstrual bleeding. She is sexually active but only occasionally. I discussed using barrier contraception. She is advised that Adipex is not safe in pregnancy and strictly advised that she needs to use barrier contraception. She did see GYN last year and was put on Metformin which helped regulating her menstrual cycles but currently she is somewhat irregular.     LMP: May 2021    Results for POC orders placed in visit on 21   POCT urine pregnancy   Result Value Ref Range    Preg Test, Ur NEGATIVE     Control           Past Medical History:   Diagnosis Date    HSV        Past Surgical History:   Procedure Laterality Date     SECTION           ALLERGIES    No Known Allergies    MEDICATIONS:      Current Outpatient Medications on File Prior to Visit   Medication Sig Dispense Refill    metFORMIN (GLUCOPHAGE) 1000 MG tablet Take 1 tablet by mouth 2 times daily (with meals) (Patient not taking: Reported on 4/22/2021) 60 tablet 3    Prenatal Vit-DSS-Fe Fum-FA (PRENATAL 19) 29-1 MG TABS Take 1 tablet by mouth daily (Patient not taking: Reported on 6/3/2021) 30 tablet 11    valACYclovir (VALTREX) 500 MG tablet Take 2 tablets by mouth 2 times daily (Patient not taking: Reported on 6/18/2021) 60 tablet 2     No current facility-administered medications on file prior to visit. HISTORY    Reviewed and no change from previous record. Tom Ragland  reports that she has been smoking cigarettes. She started smoking about 15 years ago. She has a 15.00 pack-year smoking history. She has never used smokeless tobacco.    FAMILY HISTORY:    Reviewed and No change from previous visit    HEALTH MAINTENANCE DUE:      Health Maintenance Due   Topic Date Due    Pneumococcal 0-64 years Vaccine (1 of 2 - PPSV23) Never done    COVID-19 Vaccine (1) Never done       REVIEW OF SYSTEMS:    12 point review of symptoms completed and found to be normal except noted in the HPI    Review of Systems   Constitutional: Negative for fatigue and unexpected weight change. Eyes: Negative for photophobia and visual disturbance. Respiratory: Negative for cough, shortness of breath and wheezing. Cardiovascular: Negative for chest pain, palpitations and leg swelling. Genitourinary: Negative for dysuria and menstrual problem. Neurological: Negative for dizziness, weakness, numbness and headaches. Psychiatric/Behavioral: Negative for dysphoric mood and sleep disturbance. The patient is not nervous/anxious. PHYSICAL EXAM:     Vitals:    06/18/21 1431   BP: 124/86   Site: Left Upper Arm   Position: Sitting   Cuff Size: Medium Adult   Pulse: 92   Weight: 204 lb (92.5 kg)   Height: 5' 1\" (1.549 m)     Body mass index is 38.55 kg/m².     BP Readings from Last 3 Encounters:   06/18/21 124/86   06/03/21 118/78   04/22/21 126/89        Wt Readings from Last 3 Encounters:   06/18/21 204 lb (92.5 kg)   06/03/21 204 lb (92.5 kg)   04/22/21 212 lb (96.2 kg)       Physical Exam  Vitals and nursing note reviewed. Constitutional:       Appearance: She is obese. HENT:      Head: Normocephalic and atraumatic. Eyes:      General: No scleral icterus. Extraocular Movements: Extraocular movements intact. Conjunctiva/sclera: Conjunctivae normal.      Pupils: Pupils are equal, round, and reactive to light. Cardiovascular:      Rate and Rhythm: Normal rate and regular rhythm. Pulmonary:      Effort: Pulmonary effort is normal.      Breath sounds: Normal breath sounds. No wheezing. Musculoskeletal:      Right lower leg: No edema. Left lower leg: No edema. Skin:     General: Skin is warm and dry. Neurological:      General: No focal deficit present. Mental Status: She is alert and oriented to person, place, and time. LABORATORY FINDINGS:    CBC:  Lab Results   Component Value Date    WBC 9.5 05/15/2020    HGB 14.3 05/15/2020     05/15/2020     BMP:    Lab Results   Component Value Date     12/05/2019    K 4.2 12/05/2019     12/05/2019    CO2 22 12/05/2019    BUN 13 12/05/2019    CREATININE 0.61 12/05/2019    GLUCOSE 94 12/05/2019     HEMOGLOBIN A1C:   Lab Results   Component Value Date    LABA1C 4.8 05/15/2020     MICROALBUMIN URINE: No results found for: MICROALBUR  FASTING LIPID PANEL:  Lab Results   Component Value Date    CHOL 237 (H) 12/05/2019    HDL 45 12/05/2019    TRIG 190 (H) 12/05/2019     Lab Results   Component Value Date    LDLCHOLESTEROL 154 (H) 12/05/2019       LIVER PROFILE:  Lab Results   Component Value Date    ALT 22 12/05/2019    AST 16 12/05/2019    PROT 7.0 12/05/2019    BILITOT 0.27 12/05/2019    LABALBU 4.4 12/05/2019      THYROID FUNCTION:   Lab Results   Component Value Date    TSH 1.43 05/15/2020      URINEANALYSIS: No results found for: LABURIN  ASSESSMENT AND PLAN:    1.  Class 2 obesity due to excess calories without serious comorbidity with body mass index (BMI) of 38.0 to 38.9 in adult    - phentermine (ADIPEX-P) 37.5 MG tablet; Take 1 tablet by mouth every morning (before breakfast) for 30 days. Dispense: 30 tablet; Refill: 0    2. Irregular menstrual bleeding    - POCT urine pregnancy            FOLLOW UP AND INSTRUCTIONS:   1. Return in about 4 weeks (around 7/16/2021). 2. Karime Campa received counseling on the following healthy behaviors: nutrition, exercise and medication adherence    3. Reviewed prior labs and health maintenance. 4. Discussed use, benefit, and side effects of prescribed medications. Barriers to medication compliance addressed. All patient questions answered. Pt voiced understanding.          Beth Oro  Attending Physician, [address] [place] [address], Internal Medicine Residency Program  [address] [place] [address]  6/18/2021, 2:54 PM

## 2021-06-21 DIAGNOSIS — E66.09 CLASS 2 OBESITY DUE TO EXCESS CALORIES WITHOUT SERIOUS COMORBIDITY WITH BODY MASS INDEX (BMI) OF 38.0 TO 38.9 IN ADULT: ICD-10-CM

## 2021-06-21 RX ORDER — PHENTERMINE HYDROCHLORIDE 37.5 MG/1
37.5 TABLET ORAL
Qty: 30 TABLET | Refills: 0 | Status: SHIPPED | OUTPATIENT
Start: 2021-06-21 | End: 2021-07-13 | Stop reason: SDUPTHER

## 2021-06-21 NOTE — TELEPHONE ENCOUNTER
Pt was seen Friday for refill on Adipex-- would like it to be sent to a different pharmacy-- script pended and pharmacy updated.

## 2021-06-22 DIAGNOSIS — A60.04 HERPES SIMPLEX VULVOVAGINITIS: ICD-10-CM

## 2021-06-22 NOTE — TELEPHONE ENCOUNTER
Pt feels like she may be getting ready for a breakout / asking for valtrex refill    Pt has future appointment          Next Visit Date:  Future Appointments   Date Time Provider Tavo Moya   7/13/2021 10:45 AM Mariana Ayon MD [address] IM [id number]   12/1/2021  8:30 AM SCHEDULE, [facility] [address] OB/GYN [address] OB/Gyn [address] Maintenance   Topic Date Due    Pneumococcal 0-64 years Vaccine (1 of 2 - PPSV23) Never done    COVID-19 Vaccine (1) Never done    Flu vaccine (Season Ended) 09/01/2021    Cervical cancer screen  03/05/2025    DTaP/Tdap/Td vaccine (2 - Td or Tdap) 10/13/2026    Hepatitis C screen  Completed    HIV screen  Completed    Hepatitis A vaccine  Aged Out    Hepatitis B vaccine  Aged Out    Hib vaccine  Aged Out    Meningococcal (ACWY) vaccine  Aged Out    Varicella vaccine  Discontinued       Hemoglobin A1C (%)   Date Value   05/15/2020 4.8   10/29/2018 5.0   07/28/2015 4.9             ( goal A1C is < 7)   No results found for: LABMICR  LDL Cholesterol (mg/dL)   Date Value   12/05/2019 154 (H)   10/29/2018 152 (H)       (goal LDL is <100)   AST (U/L)   Date Value   12/05/2019 16     ALT (U/L)   Date Value   12/05/2019 22     BUN (mg/dL)   Date Value   12/05/2019 13     BP Readings from Last 3 Encounters:   06/18/21 124/86   06/03/21 118/78   04/22/21 126/89          (goal 120/80)    All Future Testing planned in [facility]  Lab Frequency Next Occurrence   Lipid Panel Once 07/24/2021               Patient Active Problem List:     Herpes genitalia     Class 2 obesity due to excess calories without serious comorbidity with body mass index (BMI) of 38.0 to 38.9 in adult

## 2021-06-24 RX ORDER — VALACYCLOVIR HYDROCHLORIDE 500 MG/1
1000 TABLET, FILM COATED ORAL 2 TIMES DAILY
Qty: 30 TABLET | Refills: 0 | Status: SHIPPED | OUTPATIENT
Start: 2021-06-24 | End: 2022-10-07 | Stop reason: SDUPTHER

## 2021-07-13 ENCOUNTER — OFFICE VISIT (OUTPATIENT)
Dept: INTERNAL MEDICINE | Age: 31
End: 2021-07-13
Payer: MEDICAID

## 2021-07-13 VITALS
BODY MASS INDEX: 38.17 KG/M2 | WEIGHT: 202 LBS | SYSTOLIC BLOOD PRESSURE: 116 MMHG | DIASTOLIC BLOOD PRESSURE: 77 MMHG | HEART RATE: 71 BPM

## 2021-07-13 DIAGNOSIS — E66.09 CLASS 2 OBESITY DUE TO EXCESS CALORIES WITHOUT SERIOUS COMORBIDITY WITH BODY MASS INDEX (BMI) OF 38.0 TO 38.9 IN ADULT: Primary | ICD-10-CM

## 2021-07-13 DIAGNOSIS — K57.92 ACUTE DIVERTICULITIS: ICD-10-CM

## 2021-07-13 PROCEDURE — G8417 CALC BMI ABV UP PARAM F/U: HCPCS | Performed by: INTERNAL MEDICINE

## 2021-07-13 PROCEDURE — 4004F PT TOBACCO SCREEN RCVD TLK: CPT | Performed by: INTERNAL MEDICINE

## 2021-07-13 PROCEDURE — G8427 DOCREV CUR MEDS BY ELIG CLIN: HCPCS | Performed by: INTERNAL MEDICINE

## 2021-07-13 PROCEDURE — 99213 OFFICE O/P EST LOW 20 MIN: CPT | Performed by: INTERNAL MEDICINE

## 2021-07-13 RX ORDER — PHENTERMINE HYDROCHLORIDE 37.5 MG/1
37.5 TABLET ORAL
Qty: 30 TABLET | Refills: 0 | Status: SHIPPED | OUTPATIENT
Start: 2021-07-13 | End: 2021-08-12

## 2021-07-13 NOTE — PROGRESS NOTES
[facility]    Progress Note    Date of patient's visit: 7/13/2021    Patient's Name:  Giana Bhakta    YOB: 1990            Patient Care Team:  Harjeet Archer MD as PCP - General (Internal Medicine)  Harjeet Archer MD as PCP - [address] [place], [place] [address]jerrod [place] Provider    REASON FOR VISIT: Routine outpatient follow     Chief Complaint   Patient presents with    Weight Loss     Pt is here for 1 month f/u on Adipex          HISTORY OF PRESENT ILLNESS:    History was obtained from the patient. Giana Bhakta is a 33 y.o. is here for follow-up on her obesity and for refill on Adipex. She was seen in the ER a few days ago for acute diverticulitis. She was having chills and sweats alternately along with abdominal pain on the left side radiating towards her from her left upper quadrant to lower quadrant. No blood in her stools or diarrhea. She denies any history of constipation or straining. CT of her abdomen showed diverticulitis. She is finishing up a course of Augmentin. She has an appointment to see [facility] gastroenterology next month. She denies any further fevers or chills or abdominal pain. She did stop her Adipex for a few days because of this complication. Otherwise she has been tolerating it well and has lost some weight. No problems with her menstrual cycles. Pregnancy test was negative.     CT abdomen/pelvis 2021  FINDINGS:     This examination is limited for the evaluation of solid organs and vascular structures due to the lack of intravenous contrast.     The lung bases are normal.  The liver is unremarkable.  The gallbladder is normal.  No biliary dilatation.  The spleen and pancreas are normal.  The adrenal glands are normal.  The kidneys are normal.  Wall thickening identified within the descending colon with an inflamed diverticulum.  No dilated   loops of small bowel are identified.  The aorta is normal.  No enlarged lymph nodes.  Minimal free fluid in the pelvis.  No acute osseous abnormalities. IMPRESSION:     1. Diverticulitis of the descending colon, no perforation or abscess.       Comprehensive metabolic panel (98/56/1396 10:48 AM EDT)  Comprehensive metabolic panel (52/03/0253 10:48 AM EDT)   Component Value Ref Range Performed At [facility]   Sodium 139 134 - 146 mmol/L [facility] LAB     Potassium, Bld 4.0 3.5 - 5.0 mmol/L [facility]     Chloride 109 98 - 109 mmol/L [facility]     CO2 22 22 - 32 mmol/L [facility]     Anion gap 8 5 - 15 mmol/L [facility]     BUN 11 5 - 23 mg/dL [facility] LAB     Creatinine 0.85Comment: METHOD TRACEABLE TO IDMS STANDARD 0.40 - 1.00 mg/dL [facility]     Glucose 104 (H) 65 - 99 mg/dL [facility]     Calcium 9.2 8.5 - 10.5 mg/dL [facility]     Total Protein 6.8 6.0 - 8.0 g/dL [facility]     Albumin 3.9 3.2 - 5.3 g/dL [facility]     Alkaline Phosphatase 76 39 - 130 U/L [facility]     AST 13 0 - 41 U/L [facility]     ALT 15 0 - 31 U/L [facility]     Total bilirubin 0.6 0.3 - 1.2 mg/dL [facility]     GFR MDRD Non Af Amer >60 >59 ml/min/1.[facility]sq[facility]     GFR MDRD Af Amer >60 >59 ml/min/1.73sq.[facility]         CBC auto differential (07/06/2021 10:48 AM EDT)  CBC auto differential (07/06/2021 10:48 AM EDT)   Component Value Ref Range Performed At [facility]   White Blood Cells 15.3 (H) 4.0 - 11.0 [address] LAB     RBC count 4.65 3.80 - 5.20 [facility] LAB     Hemoglobin 13.7 11.7 - 15.5 g/dL [facility]     Hematocrit 39.0 35 - 47 % [facility]     MCV 84 80.0 - 100.0 fL [facility]     MCH 29.3 27.0 - 34.0 pg [place] [facility]     MCHC 35.0 32 - 36 g/dL [facility] LAB     RDW 13.1 11.5 - 15.0 % [facility]     Platelets 573 587  [facility] LAB     MPV 9.4 7 - 12 fL [facility] LAB     % neutrophils 82.7 % [facility]     % lymphocytes 9.8 % [facility]     % monocytes 6.0 % [facility]     % eosinophils 1.1          Past Medical History:   Diagnosis Date    HSV        Past Surgical History:   Procedure Laterality Date     SECTION           ALLERGIES    No Known Allergies    MEDICATIONS:      Current Outpatient Medications on File Prior to Visit   Medication Sig Dispense Refill    valACYclovir (VALTREX) 500 MG tablet Take 2 tablets by mouth 2 times daily 30 tablet 0    phentermine (ADIPEX-P) 37.5 MG tablet Take 1 tablet by mouth every morning (before breakfast) for 30 days. 30 tablet 0    Prenatal Vit-DSS-Fe Fum-FA (PRENATAL 19) 29-1 MG TABS Take 1 tablet by mouth daily 30 tablet 11    metFORMIN (GLUCOPHAGE) 1000 MG tablet Take 1 tablet by mouth 2 times daily (with meals) (Patient not taking: Reported on 2021) 60 tablet 3     No current facility-administered medications on file prior to visit. HISTORY    Reviewed and no change from previous record. Bong Chen  reports that she has been smoking cigarettes. She started smoking about 15 years ago. She has a 15.00 pack-year smoking history. She has never used smokeless tobacco.    FAMILY HISTORY:    Reviewed and No change from previous visit    HEALTH MAINTENANCE DUE:      Health Maintenance Due   Topic Date Due    COVID-19 Vaccine (1) Never done       REVIEW OF SYSTEMS:    12 point review of symptoms completed and found to be normal except noted in the HPI    Review of Systems     Constitutional: Negative for fatigue and unexpected weight change. Eyes: Negative for photophobia and visual disturbance.    Respiratory: Negative for cough, shortness of breath and wheezing. Cardiovascular: Negative for chest pain, palpitations and leg swelling. Genitourinary: Negative for dysuria and menstrual problem. Neurological: Negative for dizziness, weakness, numbness and headaches. Psychiatric/Behavioral: Negative for dysphoric mood and sleep disturbance. The patient is not nervous/anxious. PHYSICAL EXAM:     Vitals:    07/13/21 1116   BP: 116/77   Site: Left Upper Arm   Position: Sitting   Cuff Size: Medium Adult   Pulse: 71   Weight: 202 lb (91.6 kg)     Body mass index is 38.17 kg/m². BP Readings from Last 3 Encounters:   07/13/21 116/77   06/18/21 124/86   06/03/21 118/78        Wt Readings from Last 3 Encounters:   07/13/21 202 lb (91.6 kg)   06/18/21 204 lb (92.5 kg)   06/03/21 204 lb (92.5 kg)       Physical Exam  Vitals and nursing note reviewed. Constitutional:       Appearance: She is obese. HENT:      Head: Normocephalic and atraumatic. Eyes:      General: No scleral icterus. Extraocular Movements: Extraocular movements intact. Conjunctiva/sclera: Conjunctivae normal.      Pupils: Pupils are equal, round, and reactive to light. Cardiovascular:      Rate and Rhythm: Normal rate and regular rhythm. Pulmonary:      Effort: Pulmonary effort is normal.      Breath sounds: Normal breath sounds. No wheezing. Musculoskeletal:      Right lower leg: No edema. Left lower leg: No edema. Skin:     General: Skin is warm and dry. Neurological:      General: No focal deficit present. Mental Status: She is alert and oriented to person, place, and time.        LABORATORY FINDINGS:    CBC:  Lab Results   Component Value Date    WBC 9.5 05/15/2020    HGB 14.3 05/15/2020     05/15/2020     BMP:    Lab Results   Component Value Date     12/05/2019    K 4.2 12/05/2019     12/05/2019    CO2 22 12/05/2019    BUN 13 12/05/2019    CREATININE 0.61 12/05/2019    GLUCOSE 94 12/05/2019 HEMOGLOBIN A1C:   Lab Results   Component Value Date    LABA1C 4.8 05/15/2020     MICROALBUMIN URINE: No results found for: MICROALBUR  FASTING LIPID PANEL:  Lab Results   Component Value Date    CHOL 237 (H) 12/05/2019    HDL 45 12/05/2019    TRIG 190 (H) 12/05/2019     Lab Results   Component Value Date    LDLCHOLESTEROL 154 (H) 12/05/2019       LIVER PROFILE:  Lab Results   Component Value Date    ALT 22 12/05/2019    AST 16 12/05/2019    PROT 7.0 12/05/2019    BILITOT 0.27 12/05/2019    LABALBU 4.4 12/05/2019      THYROID FUNCTION:   Lab Results   Component Value Date    TSH 1.43 05/15/2020      URINEANALYSIS: No results found for: LABURIN  ASSESSMENT AND PLAN:    1. Class 2 obesity due to excess calories without serious comorbidity with body mass index (BMI) of 38.0 to 38.9 in adult    - phentermine (ADIPEX-P) 37.5 MG tablet; Take 1 tablet by mouth every morning (before breakfast) for 30 days. Dispense: 30 tablet; Refill: 0    2. Acute diverticulitis  Finish course of antibiotics. Follow-up with GI. She will need a colonoscopy. Advised to avoid constipation. Call or return to the ER if recurrent symptoms. FOLLOW UP AND INSTRUCTIONS:   Return in about 3 months (around 10/13/2021). 1. Maxime Uriostegui received counseling on the following healthy behaviors: nutrition, exercise and medication adherence    2. Reviewed prior labs and health maintenance. 3. Discussed use, benefit, and side effects of prescribed medications. Barriers to medication compliance addressed. All patient questions answered. Pt voiced understanding.      4. Patient given educational materials - see patient instructions    Shanthi Guevara  Attending Physician, [address] [place] [address], Internal Medicine Residency Program  [address] [place] [address]  7/13/2021, 11:34 AM

## 2021-07-13 NOTE — PATIENT INSTRUCTIONS
Return To Clinic 10/22/2021 . After Visit Summary  given and reviewed. It is very important for your care that you keep your appointment. If for some reason you are unable to keep your appointment it is equally important that you call our office at [phone] to cancel your appointment and reschedule. Failure to do so may result in your termination from our practice.     OLIVER Shepherd    Patient Education        Learning About Diverticulosis and Diverticulitis  What are diverticulosis and diverticulitis? In diverticulosis and diverticulitis, pouches called diverticula form in the wall of the large intestine, or colon. · In diverticulosis, the pouches do not cause any pain or other symptoms. · In diverticulitis, the pouches get inflamed or infected and cause symptoms. Doctors aren't sure what causes these pouches in the colon. But they think that a low-fiber diet may play a role. Without fiber to add bulk to the stool, the colon has to work harder than normal to push the stool forward. The pressure from this may cause pouches to form in weak spots along the colon. Some people with diverticulosis get diverticulitis. But experts don't know why this happens. What are the symptoms? · In diverticulosis, most people don't have symptoms. But pouches sometimes bleed. · In diverticulitis, symptoms may last from a few hours to a week or more. They include:  ? Belly pain. This is usually in the lower left side. It is sometimes worse when you move. This is the most common symptom. ? Fever and chills. ? Bloating and gas. ? Diarrhea or constipation. ? Nausea and sometimes vomiting.  ? Not feeling like eating. How can you prevent diverticulitis? You may be able to lower your chance of getting diverticulitis. You can do this by taking steps to prevent constipation. · Eat fruits, vegetables, beans, and whole grains every day. These foods are high in fiber. · Drink plenty of fluids.  If you have kidney, heart, or liver disease and have to limit fluids, talk with your doctor before you increase the amount of fluids you drink. · Get at least 30 minutes of exercise on most days of the week. Walking is a good choice. You also may want to do other activities, such as running, swimming, cycling, or playing tennis or team sports. · Take a fiber supplement, such as Citrucel or Metamucil, every day if needed. Read and follow all instructions on the label. · Schedule time each day for a bowel movement. Having a daily routine may help. Take your time and do not strain when having a bowel movement. Some people avoid nuts, seeds, berries, and popcorn. They believe that these foods might get trapped in the diverticula and cause pain. But there is no proof that these foods cause diverticulitis or make it worse. How are these problems treated? · The best way to treat diverticulosis is to avoid constipation. · Treatment for diverticulitis includes antibiotics. It often includes a change in your diet. You may need only liquids at first. Your doctor may suggest pain medicines for pain or belly cramps. In some cases, surgery may be needed. Follow-up care is a key part of your treatment and safety. Be sure to make and go to all appointments, and call your doctor if you are having problems. It's also a good idea to know your test results and keep a list of the medicines you take. Where can you learn more? Go to https://Bootup Labslio.proVITAL. org and sign in to your Face++ account. Enter V640 in the [facility] box to learn more about \"Learning About Diverticulosis and Diverticulitis. \"     If you do not have an account, please click on the \"Sign Up Now\" link. Current as of: February 10, 2021               Content Version: 12.9  © 3127-0510 Healthwise, [employer]. Care instructions adapted under license by [facility] ([facility]).  If you have questions about a medical condition or this instruction, always ask your healthcare professional. [place] [address] any warranty or liability for your use of this information. Patient Education        Diverticulitis: Care Instructions  Overview     Diverticulitis occurs when pouches form in the wall of the colon and become inflamed or infected. It can be very painful. Doctors aren't sure what causes diverticulitis. There is no proof that foods such as nuts, seeds, or berries cause it or make it worse. A low-fiber diet may cause the colon to work harder to push stool forward. Pouches may form because of this extra work. It may be hard to think about healthy eating while you're in pain. But as you recover, you might think about how you can use healthy eating for overall better health. Healthy eating may help you avoid future attacks. Follow-up care is a key part of your treatment and safety. Be sure to make and go to all appointments, and call your doctor if you are having problems. It's also a good idea to know your test results and keep a list of the medicines you take. How can you care for yourself at home? · Drink plenty of fluids. If you have kidney, heart, or liver disease and have to limit fluids, talk with your doctor before you increase the amount of fluids you drink. · Stay with liquids or a bland diet (plain rice, bananas, dry toast or crackers, applesauce) until you are feeling better. Then you can return to regular foods and slowly increase the amount of fiber in your diet. · Use a heating pad set on low on your belly to relieve mild cramps and pain. · Get extra rest until you are feeling better. · Be safe with medicines. Read and follow all instructions on the label. ? If the doctor gave you a prescription medicine for pain, take it as prescribed. ? If you are not taking a prescription pain medicine, ask your doctor if you can take an over-the-counter medicine. · If your doctor prescribed antibiotics, take them as directed.  Do not stop taking them just because you feel better. You need to take the full course of antibiotics. · Do not use laxatives or enemas unless your doctor tells you to use them. When should you call for help? Call your doctor now or seek immediate medical care if:    · You have a fever.     · You are vomiting.     · You have new or worse belly pain.     · You cannot pass stools or gas. Watch closely for changes in your health, and be sure to contact your doctor if you have any problems. Where can you learn more? Go to https://TicketLeappeMyForce.My Computer Works. org and sign in to your Root Metrics account. Enter H901 in the ngmoco box to learn more about \"Diverticulitis: Care Instructions. \"     If you do not have an account, please click on the \"Sign Up Now\" link. Current as of: February 10, 2021               Content Version: 12.9  © 5251-0087 Healthwise, Incorporated. Care instructions adapted under license by [facility] ([facility]). If you have questions about a medical condition or this instruction, always ask your healthcare professional. [address] any warranty or liability for your use of this information.

## 2021-08-17 ENCOUNTER — TELEPHONE (OUTPATIENT)
Dept: INTERNAL MEDICINE | Age: 31
End: 2021-08-17

## 2021-08-17 NOTE — LETTER
[address] [address]  Phone: [phone]  Fax: [phone]    Rowdy Moon MD        August 17, 2021    [facility]. [address] [facility] [address]      Dear [facility]: This letter is a reminder that you may have diagnostic testing that has not been completed. It is important to your well-being that these test(s) are performed. Please find the outstanding test(s) attached. If you could please have these completed before your next appointment. You can have the test completed at any [facility] facility or Lab. Please see the order for scheduling instructions. Any testing that needs completed other than blood work or xray's please call [phone] to schedule an appointment. Otherwise can be done at any [facility]. Please call our office at Dept: [phone] for additional information on the outstanding tests or let us know if they have been completed so we may update your chart. If you have any questions or concerns, please don't hesitate to call.     Sincerely,        Rowdy Moon MD

## 2021-10-15 ENCOUNTER — TELEPHONE (OUTPATIENT)
Dept: INTERNAL MEDICINE | Age: 31
End: 2021-10-15

## 2021-10-15 NOTE — LETTER
[address] [facility] [address] [address]  Phone: [phone]  Fax: [phone]    Mirela Amaya MD        October 15, 2021    Megan Cuellar  [address] [place] [address],[address] [address]      Dear Theresa Gomez: You recently missed a schedule clinic appointment to see Mirela Amaya MD on 10/15/2021. In the future, we insist that you call us at least 24 hours in advance at [phone], when you know you will be unable to keep the appointment. This will allow us to use the time for others who need to be seen. If you fail to keep your next appointment, you may be discharged from the office. GWT    If you have any questions or concerns, please don't hesitate to call.     Sincerely,        Mirela Amaya MD

## 2021-10-15 NOTE — LETTER
[address] [facility] [address] [address]  Phone: [phone]  Fax: [phone]    Aubree Mcginnis MD        October 15, 2021    [address],[facility] Floor [facility]      Dear Elizabeth Kim:    Patient no-showed today's appointment 10/15/21 with Nash Dalal. provider notified for review of record\",\"patient agrees to reschedule missed appointment\"}. If you have any questions or concerns, please don't hesitate to call.     Sincerely,        Aubree Mcginnis MD

## 2021-11-26 ENCOUNTER — TELEPHONE (OUTPATIENT)
Dept: INTERNAL MEDICINE | Age: 31
End: 2021-11-26

## 2021-11-26 NOTE — LETTER
[address] [address]  Phone: [phone]  Fax: [phone]    Makenzie Weber MD        November 26, 2021    [address],[facility]      Dear Henrry Hooks: This letter is a reminder that you may have diagnostic testing that has not been completed. It is important to your well-being that these test(s) are performed. Please find the outstanding test(s) attached. If you could please have these completed before your next appointment. You can have the test completed at any [facility] facility or Lab. Please see the order for scheduling instructions. Any testing that needs completed other than blood work or xray's please call [phone] to schedule an appointment. Otherwise can be done at any [facility]. Please call our office at Dept: [phone] for additional information on the outstanding tests or let us know if they have been completed so we may update your chart. If you have any questions or concerns, please don't hesitate to call.     Sincerely,        Makenzie Weber MD

## 2021-12-01 ENCOUNTER — NURSE ONLY (OUTPATIENT)
Dept: OBGYN | Age: 31
End: 2021-12-01
Payer: MEDICAID

## 2021-12-01 DIAGNOSIS — Z23 NEED FOR HPV VACCINATION: Primary | ICD-10-CM

## 2021-12-01 PROCEDURE — 96372 THER/PROPH/DIAG INJ SC/IM: CPT | Performed by: OBSTETRICS & GYNECOLOGY

## 2021-12-01 PROCEDURE — 90651 9VHPV VACCINE 2/3 DOSE IM: CPT | Performed by: OBSTETRICS & GYNECOLOGY

## 2021-12-01 NOTE — PROGRESS NOTES
After obtaining consent, and per orders of Dr. Angel Valadez, injection of Gardasil 9 given in Right deltoid by Kya Ambrose. Patient instructed to remain in clinic for 20 minutes afterwards, and to report any adverse reaction to me immediately.

## 2021-12-21 ENCOUNTER — TELEPHONE (OUTPATIENT)
Dept: INTERNAL MEDICINE | Age: 31
End: 2021-12-21

## 2021-12-21 NOTE — TELEPHONE ENCOUNTER
PC to patient - patient states this request came to the wrong office. Request needed to go to GYN.  Will disregard request.     Patient also scheduled for 3/17/2022

## 2021-12-21 NOTE — TELEPHONE ENCOUNTER
----- Message from Santhosh New sent at 12/21/2021 10:16 AM EST -----  Subject: Refill Request    QUESTIONS  Name of Medication? valACYclovir (VALTREX) 500 MG tablet  Patient-reported dosage and instructions? not sure  How many days do you have left? 0  [facility]? [facility]  Pharmacy phone number (if available)? [phone]  Additional Information for Provider? patient feel like an outbreak is   coming.   ---------------------------------------------------------------------------  --------------  CALL BACK INFO  What is the best way for the office to contact you?  OK to leave message on   voicemail  Preferred Call Back Phone Number? [phone]

## 2022-09-19 ENCOUNTER — NURSE ONLY (OUTPATIENT)
Dept: OBGYN | Age: 32
End: 2022-09-19
Payer: MEDICAID

## 2022-09-19 VITALS — HEIGHT: 61 IN | BODY MASS INDEX: 36.51 KG/M2 | WEIGHT: 193.4 LBS

## 2022-09-19 DIAGNOSIS — N92.6 MISSED PERIOD: Primary | ICD-10-CM

## 2022-09-19 LAB
CONTROL: PRESENT
PREGNANCY TEST URINE, POC: POSITIVE

## 2022-09-19 PROCEDURE — 81025 URINE PREGNANCY TEST: CPT

## 2022-09-19 NOTE — LETTER
[address] [facility] [address] [place] [address]  Dept: [phone]  Dept Fax: [phone]                 9/19/2022    To Whom It May Concern:    RE: Carol Pichardo KQT:[id number]      Carol Pichardo is a pregnant patient who requires dental care. Please use an abdominal shield for any x-ray studies. Please avoid non-steroidal anti-inflammatory agents, such as ibuprofen or naproxen. Antibiotics are safe other than tetracycline. Acetaminophen and narcotics are safe to administer. Local anesthetics (e.g. lidocaine) are permissible also. If you have additional questions, feel free to contact us at [phone] and leave a message using the patient's medical record number listed beneath her name. Thank you for caring for our patient.     Sincerely,        Mireille Chow MA    OB/GYN Department  [facility]

## 2022-09-30 ENCOUNTER — ANCILLARY PROCEDURE (OUTPATIENT)
Dept: OBGYN | Age: 32
End: 2022-09-30
Payer: MEDICAID

## 2022-09-30 DIAGNOSIS — Z34.91 CURRENTLY PREGNANT IN FIRST TRIMESTER WITH UNKNOWN GESTATIONAL AGE: ICD-10-CM

## 2022-09-30 PROCEDURE — 76817 TRANSVAGINAL US OBSTETRIC: CPT | Performed by: RADIOLOGY

## 2022-10-07 ENCOUNTER — OFFICE VISIT (OUTPATIENT)
Dept: FAMILY MEDICINE CLINIC | Age: 32
End: 2022-10-07
Payer: MEDICAID

## 2022-10-07 ENCOUNTER — HOSPITAL ENCOUNTER (OUTPATIENT)
Age: 32
Setting detail: SPECIMEN
Discharge: HOME | End: 2022-10-07

## 2022-10-07 VITALS
HEART RATE: 71 BPM | SYSTOLIC BLOOD PRESSURE: 123 MMHG | OXYGEN SATURATION: 98 % | TEMPERATURE: 98.1 F | DIASTOLIC BLOOD PRESSURE: 84 MMHG

## 2022-10-07 DIAGNOSIS — A60.04 HERPES SIMPLEX VULVOVAGINITIS: Primary | ICD-10-CM

## 2022-10-07 DIAGNOSIS — Z3A.09 9 WEEKS GESTATION OF PREGNANCY: ICD-10-CM

## 2022-10-07 DIAGNOSIS — R35.0 FREQUENCY OF URINATION: ICD-10-CM

## 2022-10-07 LAB
BILIRUBIN, POC: NEGATIVE
BLOOD URINE, POC: ABNORMAL
CLARITY, POC: ABNORMAL
COLOR, POC: YELLOW
GLUCOSE URINE, POC: NEGATIVE
KETONES, POC: NEGATIVE
LEUKOCYTE EST, POC: NEGATIVE
NITRITE, POC: NEGATIVE
PH, POC: 5.5
PROTEIN, POC: NEGATIVE
SPECIFIC GRAVITY, POC: >=1.03
UROBILINOGEN, POC: 0.2

## 2022-10-07 PROCEDURE — 81003 URINALYSIS AUTO W/O SCOPE: CPT | Performed by: NURSE PRACTITIONER

## 2022-10-07 PROCEDURE — G8417 CALC BMI ABV UP PARAM F/U: HCPCS | Performed by: NURSE PRACTITIONER

## 2022-10-07 PROCEDURE — 4004F PT TOBACCO SCREEN RCVD TLK: CPT | Performed by: NURSE PRACTITIONER

## 2022-10-07 PROCEDURE — G8427 DOCREV CUR MEDS BY ELIG CLIN: HCPCS | Performed by: NURSE PRACTITIONER

## 2022-10-07 PROCEDURE — 99213 OFFICE O/P EST LOW 20 MIN: CPT | Performed by: NURSE PRACTITIONER

## 2022-10-07 PROCEDURE — G8484 FLU IMMUNIZE NO ADMIN: HCPCS | Performed by: NURSE PRACTITIONER

## 2022-10-07 RX ORDER — VALACYCLOVIR HYDROCHLORIDE 500 MG/1
500 TABLET, FILM COATED ORAL 2 TIMES DAILY
Qty: 6 TABLET | Refills: 0 | Status: SHIPPED | OUTPATIENT
Start: 2022-10-07 | End: 2022-10-10

## 2022-10-07 ASSESSMENT — PATIENT HEALTH QUESTIONNAIRE - PHQ9
SUM OF ALL RESPONSES TO PHQ QUESTIONS 1-9: 0
SUM OF ALL RESPONSES TO PHQ9 QUESTIONS 1 & 2: 0
SUM OF ALL RESPONSES TO PHQ QUESTIONS 1-9: 0
1. LITTLE INTEREST OR PLEASURE IN DOING THINGS: 0
2. FEELING DOWN, DEPRESSED OR HOPELESS: 0

## 2022-10-07 NOTE — PROGRESS NOTES
[address] [address]  Dept: [phone]  Dept Fax: [phone]    Dawson Zhang is a 27 y.o. female who presents to the urgent care today for her medical conditions/complaints as notedbelow. Dawson Zhang is c/o of Other (Pt stated that she has a numb feeling in the back of her rt leg. Pt stated that she is also having pain and burning with urination, sensitive when she wipes ) and Urinary Frequency      HPI:     27 yr old 5 week pregnant female presents for pain and burning with urination, No new urinary fx. Hx herpes genitalia, feels like about to have outbreak - typical sx for her. Saw OB/GYN and verified intrauterine pregnancy  No fevers or back pain. No cramping, feels fine otherwise  No vaginal bleeding or change in vaginal discharge    Urinary Tract Infection  This is a new problem. The current episode started yesterday. The problem has been gradually worsening since onset. Associated symptoms include pain. Pertinent negatives include no hematuria. Pain location: rt labia. Her pain is at a severity of 2/10. Past Medical History:   Diagnosis Date    HSV         Current Outpatient Medications   Medication Sig Dispense Refill    valACYclovir (VALTREX) 500 MG tablet Take 1 tablet by mouth 2 times daily for 3 days 6 tablet 0    metFORMIN (GLUCOPHAGE) 1000 MG tablet Take 1 tablet by mouth 2 times daily (with meals) (Patient not taking: No sig reported) 60 tablet 3    Prenatal Vit-DSS-Fe Fum-FA (PRENATAL 19) 29-1 MG TABS Take 1 tablet by mouth daily (Patient not taking: Reported on 10/7/2022) 30 tablet 11     No current facility-administered medications for this visit. No Known Allergies    Subjective:      Review of Systems   Genitourinary:  Negative for hematuria. All other systems reviewed and are negative.   14 systems reviewed and negative except as listed in HPI.    Objective:     Physical Exam  Vitals and nursing note reviewed. Constitutional:       General: She is not in acute distress. Appearance: Normal appearance. She is well-developed. She is obese. She is not ill-appearing, toxic-appearing or diaphoretic. Comments: nontoxic   HENT:      Head: Normocephalic and atraumatic. Right Ear: External ear normal.      Left Ear: External ear normal.   Eyes:      General: No scleral icterus. Right eye: No discharge. Left eye: No discharge. Conjunctiva/sclera: Conjunctivae normal.      Pupils: Pupils are equal, round, and reactive to light. Cardiovascular:      Rate and Rhythm: Normal rate and regular rhythm. Pulses: Normal pulses. Heart sounds: Normal heart sounds. Pulmonary:      Effort: Pulmonary effort is normal.      Breath sounds: Normal breath sounds. Abdominal:      General: Bowel sounds are normal. There is no distension. Palpations: Abdomen is soft. There is no mass. Tenderness: There is no abdominal tenderness. There is no right CVA tenderness, left CVA tenderness, guarding or rebound. Hernia: No hernia is present. Genitourinary:     General: Normal vulva. Exam position: Supine. Pubic Area: No rash. Labia:         Right: Tenderness present. No rash, lesion or injury. Left: No rash, tenderness, lesion or injury. Vagina: No vaginal discharge. Comments: Tenderness rt upper labia majora, no swelling, redness or discoloration  No lesions or rash  No vaginal discharge or evidence vaginal bleeding    Musculoskeletal:         General: No tenderness or deformity. Normal range of motion. Lymphadenopathy:      Lower Body: No right inguinal adenopathy. No left inguinal adenopathy. Skin:     General: Skin is warm and dry. Capillary Refill: Capillary refill takes less than 2 seconds. Findings: No rash ( no rash to visible skin).    Neurological:      General: No focal deficit present. Mental Status: She is alert and oriented to person, place, and time. Motor: No abnormal muscle tone. Coordination: Coordination normal.   Psychiatric:         Mood and Affect: Mood normal.         Behavior: Behavior normal.     /84   Pulse 71   Temp 98.1 °F (36.7 °C)   LMP 08/03/2022 (Exact Date)   SpO2 98%     Assessment:       Diagnosis Orders   1. 9 weeks gestation of pregnancy  Culture, Urine      2. Frequency of urination  POCT Urinalysis No Micro (Auto)    Culture, Urine      3. Herpes simplex vulvovaginitis  valACYclovir (VALTREX) 500 MG tablet          Plan:     Results for POC orders placed in visit on 10/07/22   POCT Urinalysis No Micro (Auto)   Result Value Ref Range    Color, UA yellow     Clarity, UA cloudy     Glucose, UA POC negative     Bilirubin, UA negative     Ketones, UA negative     Spec Grav, UA >=1.030     Blood, UA POC trace-lysed     pH, UA 5.5     Protein, UA POC negative     Urobilinogen, UA 0.2     Leukocytes, UA negative     Nitrite, UA negative      POCT urine as above trace lysed blood noted - pt asymptomatic of vag cramping, bleeding etc  Elevated spec gravity  Increase water intake  Urine culture pending   9 weeks pregnant  Hx herpes genitalis  Here for valtrex for prodromal symptoms - typical for her  Also has more burning with urination than usual for her  No change in vaginal discharge  No change in urinary fx from being pregnant or other uti sx  Valtrex rx  She is to go to ER if develops lower abdominal cramping, vaginal bleeding or any other sx  I believe sx are related to impending herpes outbreak  Will start antibiotic based on urine culture findings  Return for f/u with oyur OB/GYN.     Orders Placed This Encounter   Medications    valACYclovir (VALTREX) 500 MG tablet     Sig: Take 1 tablet by mouth 2 times daily for 3 days     Dispense:  6 tablet     Refill:  0           Patient given educational materials - see patient instructions. Discussed use, benefit, and side effects of prescribed medications. All patient questions answered. Pt voicedunderstanding.     Electronically signed by TOBY Tobias CNP on 10/7/2022 at 1:53 PM

## 2022-10-09 LAB
CULTURE: NORMAL
SPECIMEN DESCRIPTION: NORMAL

## 2022-10-10 ENCOUNTER — TELEPHONE (OUTPATIENT)
Dept: OBGYN | Age: 32
End: 2022-10-10

## 2022-10-10 RX ORDER — VALACYCLOVIR HYDROCHLORIDE 1 G/1
1000 TABLET, FILM COATED ORAL DAILY
Qty: 5 TABLET | Refills: 0 | Status: SHIPPED | OUTPATIENT
Start: 2022-10-10 | End: 2022-10-15

## 2022-10-25 PROBLEM — O09.899 HISTORY OF PRETERM DELIVERY, CURRENTLY PREGNANT: Status: ACTIVE | Noted: 2022-10-25

## 2022-10-25 PROBLEM — Z98.891 HISTORY OF CESAREAN SECTION, LOW TRANSVERSE: Status: ACTIVE | Noted: 2022-10-25

## 2022-10-25 PROBLEM — A60.00 HERPES GENITALIA: Status: ACTIVE | Noted: 2017-02-01

## 2022-10-26 ENCOUNTER — SCHEDULED TELEPHONE ENCOUNTER (OUTPATIENT)
Dept: OBGYN | Age: 32
End: 2022-10-26

## 2022-10-26 ENCOUNTER — FOLLOWUP TELEPHONE ENCOUNTER (OUTPATIENT)
Dept: OBGYN | Age: 32
End: 2022-10-26

## 2022-10-26 DIAGNOSIS — Q51.3 BICORNATE UTERUS: ICD-10-CM

## 2022-10-26 DIAGNOSIS — Z98.891 HISTORY OF CLASSICAL CESAREAN SECTION: ICD-10-CM

## 2022-10-26 DIAGNOSIS — O09.899 HISTORY OF PRETERM DELIVERY, CURRENTLY PREGNANT: ICD-10-CM

## 2022-10-26 DIAGNOSIS — F17.210 CIGARETTE SMOKER: ICD-10-CM

## 2022-10-26 DIAGNOSIS — Z90.79 H/O BILATERAL SALPINGECTOMY: ICD-10-CM

## 2022-10-26 DIAGNOSIS — Z28.310 UNVACCINATED FOR COVID-19: ICD-10-CM

## 2022-10-26 DIAGNOSIS — E88.81 INSULIN RESISTANCE: ICD-10-CM

## 2022-10-26 DIAGNOSIS — E78.5 DYSLIPIDEMIA: ICD-10-CM

## 2022-10-26 DIAGNOSIS — E66.9 OBESITY, CLASS II, BMI 35-39.9: ICD-10-CM

## 2022-10-26 DIAGNOSIS — Z87.11 HISTORY OF PEPTIC ULCER: ICD-10-CM

## 2022-10-26 DIAGNOSIS — Z13.88 SCREENING FOR LEAD POISONING: ICD-10-CM

## 2022-10-26 DIAGNOSIS — O09.90 HIGH RISK PREGNANCY, ANTEPARTUM: ICD-10-CM

## 2022-10-26 DIAGNOSIS — R03.0 ELEVATED BLOOD PRESSURE READING: ICD-10-CM

## 2022-10-26 DIAGNOSIS — F12.91 HISTORY OF MARIJUANA USE: ICD-10-CM

## 2022-10-26 DIAGNOSIS — O09.529 ANTEPARTUM MULTIGRAVIDA OF ADVANCED MATERNAL AGE: ICD-10-CM

## 2022-10-26 DIAGNOSIS — Z87.19 HISTORY OF DIVERTICULITIS OF COLON: ICD-10-CM

## 2022-10-26 DIAGNOSIS — Z13.31 NEGATIVE DEPRESSION SCREENING: ICD-10-CM

## 2022-10-26 DIAGNOSIS — O09.90 HIGH RISK PREGNANCY, ANTEPARTUM: Primary | ICD-10-CM

## 2022-10-26 PROBLEM — K57.32 DIVERTICULITIS OF DESCENDING COLON: Status: ACTIVE | Noted: 2021-07-13

## 2022-10-26 PROBLEM — E88.819 INSULIN RESISTANCE: Status: ACTIVE | Noted: 2020-07-01

## 2022-10-26 PROBLEM — E88.819 INSULIN RESISTANCE: Status: RESOLVED | Noted: 2020-07-01 | Resolved: 2022-10-26

## 2022-10-26 PROBLEM — K57.92 DIVERTICULITIS: Status: ACTIVE | Noted: 2022-10-26

## 2022-10-26 RX ORDER — PNV NO.95/FERROUS FUM/FOLIC AC 28MG-0.8MG
1 TABLET ORAL DAILY
Qty: 30 TABLET | Refills: 12 | Status: SHIPPED | OUTPATIENT
Start: 2022-10-26

## 2022-10-26 RX ORDER — ASPIRIN 81 MG/1
81 TABLET ORAL DAILY
Qty: 30 TABLET | Refills: 5 | Status: SHIPPED | OUTPATIENT
Start: 2022-10-26

## 2022-10-26 ASSESSMENT — PATIENT HEALTH QUESTIONNAIRE - PHQ9
1. LITTLE INTEREST OR PLEASURE IN DOING THINGS: 0
SUM OF ALL RESPONSES TO PHQ QUESTIONS 1-9: 0
2. FEELING DOWN, DEPRESSED OR HOPELESS: 0
SUM OF ALL RESPONSES TO PHQ9 QUESTIONS 1 & 2: 0
SUM OF ALL RESPONSES TO PHQ QUESTIONS 1-9: 0

## 2022-10-26 NOTE — TELEPHONE ENCOUNTER
SW spoke with Pt for depression screen and [facility] initial assessment. Pt reported having a good support system, needing some help with baby items. Reports cessation of alcohol and thc, decreased use of tobacco. SW completed lead screen with Pt. One risk detected. Pt scored 0 on PHQ-2. SW educated Pt on safe sleep, infant mortality, smoking cessation, LOWELL mandate. No issues or concerns with MH symptoms, no depression or anxiety. No issues to discuss with SW at this time. Pt is linked with S, will contact [address]. Pt agreed to [facility]ways referral. Pt informed she will speak with Keesha STOVER to complete the intake. SW will follow up at 28 weeks and as needed. Lead screening for pregnant and breast-feeding women. Do you live in or regularly visit a home built before 1978 that has had renovations, repair work, or remodeling in the past 12 months? Yes  Have you resided in or emigrated from areas were  contamination is high ([facility], [facility], [place])? No  Do you live near a manufacturing plant where lead is used e.g. battery manufacturing or recycling, ship building, or plastic manufacturing? No  Do you work with lead or live with someone who does? No  Do you cook with, store or serve food in [facility]? No  Do you eat none food substances that may be contaminated with lead such as soil or lead glazed ceramic pottery? No  Do you use alternative therapies, herbs or home remedies imported from [facility], [facility], [place], [place] or  countries? No  Do you use imported traditional cosmetics such as tati or surma that may be contaminated with lead? No  Do you or a family member engage in hobbies where lead is used e.g. stained glass or making pottery with lead glaze? No  Has your home been identified as having lead pipes or water source lines with lead? No  Have you ever been told that you have high levels of lead in your body?  No  Do you live with someone identified as having elevated lead levels, child, close friend or relative?  No      Patients answering yes to any one of the above questions, offer blood lead level testing (LAB98), associate with diagnosis of Screening for Lead Poisoning, Z13.88.

## 2022-10-26 NOTE — PROGRESS NOTES
Tank Jerry is a 27 y.o. female evaluated via telephone on 10/26/2022 for No chief complaint on file. .        Documentation:  I communicated with the patient and/or health care decision maker about the OB Intake. Details of this discussion including any medical advice provided: see ntoes    Total Time: minutes: 21-30 minutes    Tank Jerry was evaluated through a synchronous (real-time) audio encounter. Patient identification was verified at the start of the visit. She (or guardian if applicable) is aware that this is a billable service, which includes applicable co-pays. This visit was conducted with the patient's (and/or legal guardian's) verbal consent. She has not had a related appointment within my department in the past 7 days or scheduled within the next 24 hours. The patient was located at Home: [place] [address]. The provider was located at [facility] (Appt Dept): [address] [facility],  [address] [facility]. Note: not billable if this call serves to triage the patient into an appointment for the relevant concern    Jagdeep Diallo RN    First Trimester Plans/Education completed per ACOG Guidelines. Pt counseled and verbalizes understanding. Routine Prenatal Tests,  Risk Factors Identified By Prenatal History, Anticipated Course of Prenatal Care, Nutrition and Weight Gain Counseling, Toxoplasmosis Precautions ( cats/raw meat), Sexual Activity, Exercise, Influenza/Tdap Vaccine, Smoking Counseling, Environmental/Work Hazards, Travel, Alcohol, Illicit/Recreational Drugs, Use Of Any Medications, Indications for Ultrasound, Domestic Violence, Seat Belt Use, Dental Care , Childbirth Classes/Hospital Facilities.      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 Fiordaliza Carrillo visits: No  Planned/ unplanned pregnancy: unplanned  Father of baby: New partner               LMP: Exact  8/3/22           Menses monthly: No  BCP at conception: No  Dating ultrasound: Completed 22 8w2d  Delivery type history:  section - low transverse delivery  History of spontaneous  delivery: No  Varicella history: Infection  Covid Vaccine: COVID unvaccinated; declines COVID vaccine in pregnancy  Flu Vaccine in pregnancy: No   TDAP Vaccine in pregnancy: No   Blood transfusion acceptable: Yes   Last Pap: 3/15/20             Report available: Yes, WNL  First Trimester Screen/NIPT/MSAFP/Quad: FTS, MFM referral placed for FTS 10//26/22 at 12w0d  Initial prenatal labs ordered today: Yes  Smoker: Yes, motivated to quit  Pre-Gravid BMI: 35.73, 35-39.9 - Obesity Grade II  Recommend weight gain: Obese (BMI over 30): Gain 11 to 20 pounds  Substance abuse history: Yes  Depression/anxiety history: Denies  Domestic abuse history: Denies  Dental care: Reviewed, patient verbalizes understanding  Reviewed how to reach Ob/Gyn resident after hours: Reviewed, patient verbalizes understanding

## 2022-11-01 ENCOUNTER — HOSPITAL ENCOUNTER (OUTPATIENT)
Age: 32
Setting detail: SPECIMEN
Discharge: HOME | End: 2022-11-01

## 2022-11-01 DIAGNOSIS — O09.90 HIGH RISK PREGNANCY, ANTEPARTUM: ICD-10-CM

## 2022-11-01 LAB
ABO/RH: NORMAL
ABSOLUTE EOS #: 0.18 K/UL (ref 0–0.44)
ABSOLUTE IMMATURE GRANULOCYTE: 0.1 K/UL (ref 0–0.3)
ABSOLUTE LYMPH #: 1.83 K/UL (ref 1.1–3.7)
ABSOLUTE MONO #: 0.65 K/UL (ref 0.1–1.2)
AMPHETAMINE SCREEN URINE: NEGATIVE
ANTIBODY SCREEN: NEGATIVE
BARBITURATE SCREEN URINE: NEGATIVE
BASOPHILS # BLD: 0 % (ref 0–2)
BASOPHILS ABSOLUTE: <0.03 K/UL (ref 0–0.2)
BENZODIAZEPINE SCREEN, URINE: NEGATIVE
CANNABINOID SCREEN URINE: POSITIVE
CASTS UA: ABNORMAL /LPF (ref 0–2)
CASTS UA: ABNORMAL /LPF (ref 0–2)
COCAINE METABOLITE, URINE: NEGATIVE
EOSINOPHILS RELATIVE PERCENT: 2 % (ref 1–4)
EPITHELIAL CELLS UA: ABNORMAL /HPF (ref 0–5)
FENTANYL URINE: NEGATIVE
HCT VFR BLD CALC: 37.9 % (ref 36.3–47.1)
HEMOGLOBIN: 12.6 G/DL (ref 11.9–15.1)
HEPATITIS B SURFACE ANTIGEN: NONREACTIVE
HEPATITIS C ANTIBODY: NONREACTIVE
HIV AG/AB: NONREACTIVE
IMMATURE GRANULOCYTES: 1 %
LYMPHOCYTES # BLD: 16 % (ref 24–43)
MCH RBC QN AUTO: 29 PG (ref 25.2–33.5)
MCHC RBC AUTO-ENTMCNC: 33.2 G/DL (ref 28.4–34.8)
MCV RBC AUTO: 87.1 FL (ref 82.6–102.9)
METHADONE SCREEN, URINE: NEGATIVE
MONOCYTES # BLD: 6 % (ref 3–12)
MUCUS: ABNORMAL
NRBC AUTOMATED: 0 PER 100 WBC
OPIATES, URINE: NEGATIVE
OXYCODONE SCREEN URINE: NEGATIVE
PDW BLD-RTO: 12.4 % (ref 11.8–14.4)
PHENCYCLIDINE, URINE: NEGATIVE
PLATELET # BLD: 257 K/UL (ref 138–453)
PMV BLD AUTO: 11.4 FL (ref 8.1–13.5)
RBC # BLD: 4.35 M/UL (ref 3.95–5.11)
RBC UA: ABNORMAL /HPF (ref 0–2)
RUBV IGG SER QL: 49 IU/ML
SEG NEUTROPHILS: 75 % (ref 36–65)
SEGMENTED NEUTROPHILS ABSOLUTE COUNT: 9 K/UL (ref 1.5–8.1)
T. PALLIDUM, IGG: NONREACTIVE
TEST INFORMATION: ABNORMAL
WBC # BLD: 11.8 K/UL (ref 3.5–11.3)
WBC UA: ABNORMAL /HPF (ref 0–5)

## 2022-11-02 ENCOUNTER — ROUTINE PRENATAL (OUTPATIENT)
Dept: PERINATAL CARE | Age: 32
End: 2022-11-02
Payer: MEDICAID

## 2022-11-02 ENCOUNTER — HOSPITAL ENCOUNTER (OUTPATIENT)
Age: 32
Discharge: HOME | End: 2022-11-02
Payer: MEDICAID

## 2022-11-02 VITALS
RESPIRATION RATE: 16 BRPM | WEIGHT: 192 LBS | SYSTOLIC BLOOD PRESSURE: 120 MMHG | BODY MASS INDEX: 36.25 KG/M2 | DIASTOLIC BLOOD PRESSURE: 72 MMHG | HEART RATE: 77 BPM | HEIGHT: 61 IN | TEMPERATURE: 98.1 F

## 2022-11-02 DIAGNOSIS — O99.211 OBESITY AFFECTING PREGNANCY IN FIRST TRIMESTER: ICD-10-CM

## 2022-11-02 DIAGNOSIS — Z82.49 FAMILY HISTORY OF THROMBOEMBOLIC DISEASE: ICD-10-CM

## 2022-11-02 DIAGNOSIS — Q51.28 SEPTATE UTERUS AFFECTING PREGNANCY IN FIRST TRIMESTER: ICD-10-CM

## 2022-11-02 DIAGNOSIS — O16.1 HYPERTENSION AFFECTING PREGNANCY IN FIRST TRIMESTER: ICD-10-CM

## 2022-11-02 DIAGNOSIS — Z36.9 FIRST TRIMESTER SCREENING: ICD-10-CM

## 2022-11-02 DIAGNOSIS — O09.521 MULTIGRAVIDA OF ADVANCED MATERNAL AGE IN FIRST TRIMESTER: Primary | ICD-10-CM

## 2022-11-02 DIAGNOSIS — O35.2XX0 HEREDITARY FAMILIAL DISEASE AFFECTING MANAGEMENT OF MOTHER AND POSSIBLY AFFECTING FETUS, ANTEPARTUM, SINGLE OR UNSPECIFIED FETUS: ICD-10-CM

## 2022-11-02 DIAGNOSIS — Z3A.13 13 WEEKS GESTATION OF PREGNANCY: ICD-10-CM

## 2022-11-02 DIAGNOSIS — O34.01 SEPTATE UTERUS AFFECTING PREGNANCY IN FIRST TRIMESTER: ICD-10-CM

## 2022-11-02 LAB
CREATININE URINE: 211 MG/DL (ref 28–217)
CRL: NORMAL
CULTURE: NORMAL
HEPARIN LOW MOLECULAR WEIGHT: <0.1 IU/ML
HOMOCYSTEINE: 6 UMOL/L
SAC DIAMETER: NORMAL
SPECIMEN DESCRIPTION: NORMAL
TOTAL PROTEIN, URINE: 14 MG/DL
URINE TOTAL PROTEIN CREATININE RATIO: 0.07 (ref 0–0.2)

## 2022-11-02 PROCEDURE — 85306 CLOT INHIBIT PROT S FREE: CPT

## 2022-11-02 PROCEDURE — 85520 HEPARIN ASSAY: CPT

## 2022-11-02 PROCEDURE — 99204 OFFICE O/P NEW MOD 45 MIN: CPT | Performed by: OBSTETRICS & GYNECOLOGY

## 2022-11-02 PROCEDURE — 85300 ANTITHROMBIN III ACTIVITY: CPT

## 2022-11-02 PROCEDURE — 85301 ANTITHROMBIN III ANTIGEN: CPT

## 2022-11-02 PROCEDURE — 86147 CARDIOLIPIN ANTIBODY EA IG: CPT

## 2022-11-02 PROCEDURE — 81291 MTHFR GENE: CPT

## 2022-11-02 PROCEDURE — 81241 F5 GENE: CPT

## 2022-11-02 PROCEDURE — 82570 ASSAY OF URINE CREATININE: CPT

## 2022-11-02 PROCEDURE — 76813 OB US NUCHAL MEAS 1 GEST: CPT | Performed by: OBSTETRICS & GYNECOLOGY

## 2022-11-02 PROCEDURE — G8417 CALC BMI ABV UP PARAM F/U: HCPCS | Performed by: OBSTETRICS & GYNECOLOGY

## 2022-11-02 PROCEDURE — 81240 F2 GENE: CPT

## 2022-11-02 PROCEDURE — 76801 OB US < 14 WKS SINGLE FETUS: CPT | Performed by: OBSTETRICS & GYNECOLOGY

## 2022-11-02 PROCEDURE — 85302 CLOT INHIBIT PROT C ANTIGEN: CPT

## 2022-11-02 PROCEDURE — 81508 FTL CGEN ABNOR TWO PROTEINS: CPT

## 2022-11-02 PROCEDURE — G8427 DOCREV CUR MEDS BY ELIG CLIN: HCPCS | Performed by: OBSTETRICS & GYNECOLOGY

## 2022-11-02 PROCEDURE — 85730 THROMBOPLASTIN TIME PARTIAL: CPT

## 2022-11-02 PROCEDURE — 85305 CLOT INHIBIT PROT S TOTAL: CPT

## 2022-11-02 PROCEDURE — 83090 ASSAY OF HOMOCYSTEINE: CPT

## 2022-11-02 PROCEDURE — 84156 ASSAY OF PROTEIN URINE: CPT

## 2022-11-02 PROCEDURE — 85610 PROTHROMBIN TIME: CPT

## 2022-11-02 PROCEDURE — G8484 FLU IMMUNIZE NO ADMIN: HCPCS | Performed by: OBSTETRICS & GYNECOLOGY

## 2022-11-02 PROCEDURE — 85303 CLOT INHIBIT PROT C ACTIVITY: CPT

## 2022-11-02 PROCEDURE — 85613 RUSSELL VIPER VENOM DILUTED: CPT

## 2022-11-03 LAB
SEND OUT REPORT: NORMAL
TEST NAME: NORMAL

## 2022-11-05 LAB
AFP INTERPRETATION: NORMAL
AFP SPECIMEN: NORMAL
ANTI-THROMBIN 3 AG: 87 % (ref 82–136)
CRL: 66.9 MM
CROWN RUMP LENGTH TWIN B: NORMAL MM
CROWN RUMP LENGTH: 66.9
DATE OF BIRTH: NORMAL
DONOR EGG?: NORMAL
GESTATIONAL AGE: NORMAL
HISTORY OF ANEUPLOIDY?: NORMAL
IN VITRO FERTILIZATION: NORMAL
MATERNAL AGE AT EDD: 35.1 YR
MATERNAL SCREEN, EER: NORMAL
MATERNAL WEIGHT: 192
MOM FOR NT, TWIN B: NORMAL
MOM FOR NT: 0.91
MOM FOR PAPP-A: 1.11
MONOCHORIONIC TWINS: NORMAL
MSHCG-MOM: 0.93
MSHCG: NORMAL IU/L
MTHFR INTERPRETATION: NORMAL
MTHFR MUTATION A1286C: NORMAL
MTHFR MUTATION C665T: NEGATIVE
NUCHAL TRANSLUC (NT): 1.5
NUCHAL TRANSLUC (NT): 1.5 MM
NUCHAL TRANSLUCENCY TWIN B: NORMAL MM
NUMBER OF FETUSES: 1
NUMBER OF FETUSES: NORMAL
PAPP-A MOM: 858.5 NG/ML
PATIENT WEIGHT UNITS: NORMAL
PATIENT WEIGHT: NORMAL
PREV TRISOMY PREG: NORMAL
PROTEIN C ANTIGEN: >95 % (ref 63–153)
PROTEIN S ANTIGEN, TOTAL: 92 % (ref 63–126)
RACE (MATERNAL): NORMAL
RACE: NORMAL
READING MD CERT NUM: NORMAL
READING MD NAME: NORMAL
REPEAT SPECIMEN?: NORMAL
SMOKING: NORMAL
SMOKING: NORMAL
SONOGRAPHER CERT NO: NORMAL
SONOGRAPHER CERT NO: NORMAL
SONOGRAPHER NAME: NORMAL
SONOGRAPHER NAME: NORMAL
SPECIMEN: NORMAL
ULTRASOUND DATE: NORMAL
ULTRASOUND DATE: NORMAL

## 2022-11-06 LAB
FACTOR V MUTATION: NEGATIVE
SPECIMEN: NORMAL

## 2022-11-07 ENCOUNTER — HOSPITAL ENCOUNTER (OUTPATIENT)
Age: 32
Setting detail: SPECIMEN
Discharge: HOME | End: 2022-11-07

## 2022-11-07 DIAGNOSIS — E66.9 OBESITY, CLASS II, BMI 35-39.9: ICD-10-CM

## 2022-11-07 DIAGNOSIS — O09.90 HIGH RISK PREGNANCY, ANTEPARTUM: ICD-10-CM

## 2022-11-07 LAB
GLUCOSE ADMINISTRATION: ABNORMAL
GLUCOSE TOLERANCE SCREEN 50G: 148 MG/DL (ref 70–135)
MISCELLANEOUS LAB TEST RESULT: NORMAL
PROTHROMBIN G20210A MUTATION: NEGATIVE
PT PCR SPECIMEN: NORMAL
TEST NAME: NORMAL

## 2022-11-08 LAB — LEAD BLOOD: 1 UG/DL (ref 0–4)

## 2022-11-09 ENCOUNTER — HOSPITAL ENCOUNTER (OUTPATIENT)
Age: 32
Setting detail: SPECIMEN
Discharge: HOME | End: 2022-11-09

## 2022-11-09 ENCOUNTER — INITIAL PRENATAL (OUTPATIENT)
Dept: OBGYN | Age: 32
End: 2022-11-09
Payer: MEDICAID

## 2022-11-09 VITALS
WEIGHT: 198 LBS | DIASTOLIC BLOOD PRESSURE: 87 MMHG | SYSTOLIC BLOOD PRESSURE: 138 MMHG | BODY MASS INDEX: 37.41 KG/M2 | HEART RATE: 85 BPM

## 2022-11-09 DIAGNOSIS — Z98.891 HISTORY OF CESAREAN SECTION, CLASSICAL: ICD-10-CM

## 2022-11-09 DIAGNOSIS — Z80.3 FAMILY HISTORY OF BREAST CANCER: ICD-10-CM

## 2022-11-09 DIAGNOSIS — Z82.49 FAMILY HISTORY OF BLOOD CLOTS: ICD-10-CM

## 2022-11-09 DIAGNOSIS — O10.919 CHRONIC HYPERTENSION AFFECTING PREGNANCY: ICD-10-CM

## 2022-11-09 DIAGNOSIS — Z34.90 PRENATAL CARE, ANTEPARTUM: ICD-10-CM

## 2022-11-09 DIAGNOSIS — R73.09 ABNORMAL GTT (GLUCOSE TOLERANCE TEST): ICD-10-CM

## 2022-11-09 DIAGNOSIS — Z34.90 PRENATAL CARE, ANTEPARTUM: Primary | ICD-10-CM

## 2022-11-09 LAB
ANTICARDIOLIPIN IGA ANTIBODY: 1.7 APL (ref 0–14)
ANTICARDIOLIPIN IGG ANTIBODY: 0.7 GPL (ref 0–10)
AT-III ACTIVITY: 100 % (ref 83–122)
CARDIOLIPIN AB IGM: 2 MPL (ref 0–10)
DILUTE RUSSELL VIPER VENOM TIME: NORMAL
INR BLD: 0.9
PARTIAL THROMBOPLASTIN TIME: 21.8 SEC (ref 20.5–30.5)
PROTEIN C ACTIVITY: 111 %
PROTEIN S ACTIVITY: 68 % (ref 59–130)
PROTHROMBIN TIME: 9.9 SEC (ref 9.1–12.3)

## 2022-11-09 PROCEDURE — G8427 DOCREV CUR MEDS BY ELIG CLIN: HCPCS | Performed by: STUDENT IN AN ORGANIZED HEALTH CARE EDUCATION/TRAINING PROGRAM

## 2022-11-09 PROCEDURE — 4004F PT TOBACCO SCREEN RCVD TLK: CPT | Performed by: STUDENT IN AN ORGANIZED HEALTH CARE EDUCATION/TRAINING PROGRAM

## 2022-11-09 PROCEDURE — 99213 OFFICE O/P EST LOW 20 MIN: CPT | Performed by: STUDENT IN AN ORGANIZED HEALTH CARE EDUCATION/TRAINING PROGRAM

## 2022-11-09 PROCEDURE — G8417 CALC BMI ABV UP PARAM F/U: HCPCS | Performed by: STUDENT IN AN ORGANIZED HEALTH CARE EDUCATION/TRAINING PROGRAM

## 2022-11-09 PROCEDURE — G8484 FLU IMMUNIZE NO ADMIN: HCPCS | Performed by: STUDENT IN AN ORGANIZED HEALTH CARE EDUCATION/TRAINING PROGRAM

## 2022-11-09 NOTE — PROGRESS NOTES
[facility] OB/GYN  Initial Prenatal Visit    CC: Initial Prenatal Visit    HPI:   Emigdio Buenrostro is a 27 y.o. female  at 14w0d  She is being seen today for her first obstetrical visit. Pregnancy history fully reviewed. This is not a planned pregnancy. Her LMP is Patient's last menstrual period was 2022 (exact date). Her obstetrical history is significant for cHTN (no meds), hx classical CS x1, Hx HSV, AMA at time of delivery. The patient was seen and evaluated. The patient denies any complaints today. There was absent fetal movements. She denies contractions, vaginal bleeding and leakage of fluid. She currently denies any signs or symptoms of pre-eclampsia which include headache, vision changes, RUQ pain. The patient undecided the T-Dap Vaccine (27-36 weeks) this pregnancy. The patient is Rh positive and Rhogam is not indicated in this pregnancy. The patient declined the influenza vaccine this year. The patient declined the COVID-19 vaccine this year. Relationship with FOB: supportive, in a relationship  Mother's ethnicity:   Father's ethnicity:   Family History:    - Neural tube defects: No   - Congenital birth defects (congenital heart defects, polydactyly, cleft lip/palate): No   - Intellectual disability: No   - Genetic disorders/chromosomal abnormalities: No   - Diabetes mellitus in first degree relatives: Yes: patient's father  Genetic screening was discussed and patient requested. OB History:  OB History    Para Term  AB Living   2 1 0 1 0 1   SAB IAB Ectopic Molar Multiple Live Births   0 0 0 0 0 1      # Outcome Date GA Lbr Joe/2nd Weight Sex Delivery Anes PTL Lv   2 Current            1  / 29w0d  2 lb 11 oz (1.219 kg) F CS-Classical  Y RENEE      Obstetric Comments   G1, : Patient states she had had intercourse with FOB and she started bleeding very heavily.  Patient reports she was placed on bedrest in the hospital, was there for two weeks, got up one day and was passing large clots, at which point she was advised she would be having an emergency C section. Patient reports being \"95% sure\" she was told that she would require another C section. She was told \"not everybody has to have another C section again, but you will. \" Patient amenable to signing release of records for [facility] 2008 delivery. Past Medical History:  Past Medical History:   Diagnosis Date    Chlamydia     HSV     Infertility, female      delivery     STD (sexually transmitted disease) 2017    Herpes; last outbreak 10/10/22    Uterine disorder        Past Surgical History:  Past Surgical History:   Procedure Laterality Date     SECTION          Medications:  Current Outpatient Medications on File Prior to Visit   Medication Sig Dispense Refill    aspirin EC 81 MG EC tablet Take 1 tablet by mouth daily 30 tablet 5    Prenatal Vit-Fe Fumarate-FA (PRENATAL VITAMIN) 27-0.8 MG TABS Take 1 tablet by mouth daily May substitute with any prenatal vitamin covered by the patient's insurance. 30 tablet 12    Prenatal Vit-DSS-Fe Fum-FA (PRENATAL 19) 29-1 MG TABS Take 1 tablet by mouth daily 30 tablet 11    metFORMIN (GLUCOPHAGE) 1000 MG tablet Take 1 tablet by mouth 2 times daily (with meals) (Patient not taking: No sig reported) 60 tablet 3     No current facility-administered medications on file prior to visit. Allergies:   Allergies as of 2022    (No Known Allergies)       Social History:  Social History     Socioeconomic History    Marital status: Single     Spouse name: Not on file    Number of children: Not on file    Years of education: Not on file    Highest education level: Not on file   Occupational History    Not on file   Tobacco Use    Smoking status: Every Day     Packs/day: 1.00     Years: 15.00     Pack years: 15.00     Types: Cigarettes     Start date: 2006     Passive exposure: Current    Smokeless tobacco: Never    Tobacco comments:     Patient counseled on maternal/fetal risk factors. Patient verbalizes understanding     Vaping Use    Vaping Use: Never used    Passive vaping exposure: Yes   Substance and Sexual Activity    Alcohol use: Not Currently     Comment: Last drink months before pregnancy    Drug use: Not Currently     Frequency: 0.5 times per week     Types: Marijuana Rachelle Legions)     Comment: Last smoked marijuana shortly before 20    Sexual activity: Yes     Partners: Male   Other Topics Concern    Not on file   Social History Narrative    Not on file     Social Determinants of Health     Financial Resource Strain: Not on file   Food Insecurity: Not on file   Transportation Needs: Not on file   Physical Activity: Not on file   Stress: Not on file   Social Connections: Not on file   Intimate Partner Violence: Not on file   Housing Stability: Not on file       Family History:  Family History   Problem Relation Age of Onset    No Known Problems Paternal Grandmother     Heart Attack Maternal Grandmother     No Known Problems Maternal Grandfather     Cancer Father         Skin cancer    Heart Disease Father         heart transplant    Diabetes Father     Valvular Heart Disease Father     Hypertension Mother     Breast Cancer Mother 48        breast ca    No Known Problems Sister        Vitals:    BP: 138/87  Weight: 198 lb (89.8 kg)  Heart Rate: 85  Fetal HR: 135     Physical Exam: Completed, See Epic Navigator   Chaperone for Intimate Exam: Chaperone was present for entire exam, Chaperone Name: [place]       Assessment & Plan:  Vee Terrazas is a 27 y.o. female  at 14w0d Initial Obstetrical Visit   - The patient was seen full history and physical was completed/reviewed.     - Prenatal labs completed   - Prenatal vitamins prescription PRN   - Aspirin indication: indicated due to High risk factors: chronic hypertension, Moderate risk factors: BMI >30- Rx given   - Problem list reviewed and updated   - NT Screen/Quad Testing and MSAFP Single Marker/NIPT: requested, lab completed and reviewed with patient    - Role of ultrasound in pregnancy discussed; requests fetal survey, MFM referral previously ordered   - Gc/Chlam Cultures & Vaginitis: collected today   - Last pap smear 3/5/2020- Pap Smear discussed   - Tdap vaccination: discussed recommendations for TDAP immunization, patient undecided . - Influenza vaccination: declined   - Rhogam: not indicated   - COVID-19 vaccination: R/B/A discussed with increased risk of both maternal and fetal morbidity and mortality in unvaccinated pregnant patients who contract COVID-19- patient declined today   - Indications for  section:  history of classical C section , ERAS protocols: not yet reviewed, will schedule for 3rd trimester    cHTN (no meds)   - BP normotensive   - Patient reports compliance with ASA 81mg   - PreE labs wnl, P/C 0.07   - Continue to monitor closely    Hx Classical CS x1   - G1 at 29w0d at [facility]  - Patient aware of need for scheduled repeat CS from [id number]   - Desires RRS at time of CS; plan to fill out forms after 20 weeks     Upon completion of the visit all questions were answered and the patients follow-up and testing schedule were reviewed. Patient Active Problem List    Diagnosis Date Noted    Elevated early 1 hour GTT 2022     Priority: Medium     Early 1hr GTT: 148. Early 3 hour ordered. Dyslipidemia 10/26/2022     Priority: Medium    Advacned maternal age 10/26/2022     Priority: Medium    Cigarette smoker 10/26/2022     Priority: Medium     10/26/22: Patient reports having smoked a pack a day before pregnancy. Patient reports now smoking just \"a few\" cigarettes a day. Patient counseled on maternal/fetal risk factors.  Patient verbalizes understanding       Patient desires risk reducing bilateral salpingectomy 10/26/2022     Priority: Medium    H/O  delivery, currently pregnant 10/25/2022     Priority: Medium     G12008: Patient states she had had intercourse with FOB and she started bleeding very heavily. Patient reports she was placed on bedrest in the hospital, was there for two weeks, got up one day and was passing large clots, at which point she was advised she would be having an emergency C section. Patient reports being \"95% sure\" she was told that she would require another C section. She was told \"not everybody has to have another C section again, but you will. \" Patient denies sha with bleeding. Patient amenable to signing release of records for [facility]  delivery. H/O classical  section x1 10/25/2022     Priority: Medium     G12008: Patient states she had had intercourse with FOB and she started bleeding very heavily. Patient reports she was placed on bedrest in the hospital, was there for two weeks, got up one day and was passing large clots, at which point she was advised she would be having an emergency C section. Patient reports being \"95% sure\" she was told that she would require another C section. She was told \"not everybody has to have another C section again, but you will. \" Patient denies sha with bleeding. Patient amenable to signing release of records for [facility]  delivery. H/O diverticulitis of descending colon 2021     Priority: Medium     10/26/22: Patient denies current symptoms      H/O peptic ulcer 2016     Priority: Medium     10/26/22: Patient reports frequent \"stomach aches\" but denies medications for symptoms at this time.       High risk pregnancy, antepartum 10/26/2022     Fetal testing indicated: Yes  Fetal testing indication: Advanced maternal age  Fetal testing initiation: Individualize  Fetal testing frequency: Individualize     Fetal testing indicated: Yes  Fetal testing indication: Pregravid BMI >35  Fetal testing initiation: 37 0/7  Fetal testing frequency: Weekly      H/O marijuana use 10/26/2022     10/26/22: Patient denies marijuana use since just before 22, reports having been a \"light\" user (one to two times per month). Unvaccinated for covid-19 10/26/2022     10/26/22: Patient counseled on the the risks of not getting vaccinated in pregnancy against COVID-19. COVID-19 during pregnancy  increases the risk for adverse outcomes, including  birth and admission of the baby to an intensive care unit. Patient continues to decline vaccination. Bicornate uterus 10/26/2022     2020: \"Question mild bicornuate or septated appearance of the distal aspect of the endometrium. \"          cHTN (no meds) 10/26/2022     2021: 132/96  2020: 145/105      Screening for lead poisoning 10/26/2022     10/26/22: Conducted by Josefina MIN, blood lead lab ordered per protocol. Pregravid BMI 35.73 2018     10/26/22: One hour glucose tolerance test ordered for early diabetic screening      Herpes genitalia 2017     2017: Initial outbreak  G2: Outbreak in first trimester, 10/10/22, approximately 9 weeks GA       Return in about 4 weeks (around 2022) for 4 week MARCIA.     Catrachito Ruiz DO  Ob/Gyn Resident  [facility] [address]  2022, 3:10 PM

## 2022-11-10 DIAGNOSIS — R73.09 GLUCOSE TOLERANCE TEST ABNORMAL: Primary | ICD-10-CM

## 2022-11-10 LAB
C TRACH DNA GENITAL QL NAA+PROBE: NEGATIVE
CANDIDA SPECIES, DNA PROBE: NEGATIVE
GARDNERELLA VAGINALIS, DNA PROBE: POSITIVE
N. GONORRHOEAE DNA: NEGATIVE
SOURCE: ABNORMAL
SPECIMEN DESCRIPTION: NORMAL
TRICHOMONAS VAGINALIS DNA: NEGATIVE

## 2022-11-10 NOTE — PROGRESS NOTES
Attending Physician Statement  I have discussed the care of Tank Jerry, including pertinent history and exam findings,  with the resident. I have reviewed the key elements of all parts of the encounter with the resident. I agree with the assessment, plan and orders as documented by the resident.   (GE Modifier)    Elza Eckert,

## 2022-11-11 RX ORDER — METRONIDAZOLE 500 MG/1
500 TABLET ORAL 2 TIMES DAILY
Qty: 14 TABLET | Refills: 0 | Status: SHIPPED | OUTPATIENT
Start: 2022-11-11 | End: 2022-11-18

## 2022-11-18 ENCOUNTER — TELEPHONE (OUTPATIENT)
Dept: OBGYN | Age: 32
End: 2022-11-18

## 2022-11-18 DIAGNOSIS — A60.00 GENITAL HERPES SIMPLEX, UNSPECIFIED SITE: Primary | ICD-10-CM

## 2022-11-18 RX ORDER — VALACYCLOVIR HYDROCHLORIDE 500 MG/1
500 TABLET, FILM COATED ORAL 2 TIMES DAILY
Qty: 6 TABLET | Refills: 0 | Status: SHIPPED | OUTPATIENT
Start: 2022-11-18 | End: 2022-11-21

## 2022-11-18 NOTE — TELEPHONE ENCOUNTER
OB patient called stating she is having a herpes outbreak and is requesting medication to be sent to [facility]

## 2022-11-21 ENCOUNTER — TELEPHONE (OUTPATIENT)
Dept: PERINATAL CARE | Age: 32
End: 2022-11-21

## 2022-11-21 NOTE — TELEPHONE ENCOUNTER
Dr. Loki Batres reviewed thrombophilia lab results and ordered pt to start Foltx daily. Pt notified and agreed- asked to have called into [address] [facility] [place] on [address]. Writer called into above pharmacy, [phone], Antoine Tello, Foltx, 1 po daily, 27 week supply, no refill.

## 2022-11-25 PROBLEM — Z13.88 SCREENING FOR LEAD POISONING: Status: RESOLVED | Noted: 2022-10-26 | Resolved: 2022-11-25

## 2022-11-30 ENCOUNTER — ROUTINE PRENATAL (OUTPATIENT)
Dept: PERINATAL CARE | Age: 32
End: 2022-11-30
Payer: MEDICAID

## 2022-11-30 VITALS
RESPIRATION RATE: 16 BRPM | HEART RATE: 86 BPM | SYSTOLIC BLOOD PRESSURE: 122 MMHG | TEMPERATURE: 98 F | WEIGHT: 198 LBS | HEIGHT: 61 IN | BODY MASS INDEX: 37.38 KG/M2 | DIASTOLIC BLOOD PRESSURE: 77 MMHG

## 2022-11-30 DIAGNOSIS — O34.02 SEPTATE UTERUS AFFECTING PREGNANCY IN SECOND TRIMESTER: Primary | ICD-10-CM

## 2022-11-30 DIAGNOSIS — Z3A.17 17 WEEKS GESTATION OF PREGNANCY: ICD-10-CM

## 2022-11-30 DIAGNOSIS — O44.40 LOW IMPLANTATION OF PLACENTA WITHOUT HEMORRHAGE: ICD-10-CM

## 2022-11-30 DIAGNOSIS — O09.522 MULTIGRAVIDA OF ADVANCED MATERNAL AGE IN SECOND TRIMESTER: ICD-10-CM

## 2022-11-30 DIAGNOSIS — O16.2 HYPERTENSION AFFECTING PREGNANCY IN SECOND TRIMESTER: ICD-10-CM

## 2022-11-30 DIAGNOSIS — O99.212 OBESITY AFFECTING PREGNANCY IN SECOND TRIMESTER: ICD-10-CM

## 2022-11-30 DIAGNOSIS — Q51.28 SEPTATE UTERUS AFFECTING PREGNANCY IN SECOND TRIMESTER: Primary | ICD-10-CM

## 2022-11-30 DIAGNOSIS — O35.2XX0 HEREDITARY FAMILIAL DISEASE AFFECTING MANAGEMENT OF MOTHER AND POSSIBLY AFFECTING FETUS, ANTEPARTUM, SINGLE OR UNSPECIFIED FETUS: ICD-10-CM

## 2022-11-30 DIAGNOSIS — Z13.89 ENCOUNTER FOR ROUTINE SCREENING FOR MALFORMATION USING ULTRASONICS: ICD-10-CM

## 2022-11-30 LAB
ABDOMINAL CIRCUMFERENCE: NORMAL
ABDOMINAL CIRCUMFERENCE: NORMAL
BIPARIETAL DIAMETER: NORMAL
BIPARIETAL DIAMETER: NORMAL
ESTIMATED FETAL WEIGHT: NORMAL
ESTIMATED FETAL WEIGHT: NORMAL
FEMORAL DIAMETER: NORMAL
FEMORAL DIAMETER: NORMAL
HC/AC: NORMAL
HC/AC: NORMAL
HEAD CIRCUMFERENCE: NORMAL
HEAD CIRCUMFERENCE: NORMAL

## 2022-11-30 PROCEDURE — 76805 OB US >/= 14 WKS SNGL FETUS: CPT | Performed by: OBSTETRICS & GYNECOLOGY

## 2022-11-30 PROCEDURE — 76817 TRANSVAGINAL US OBSTETRIC: CPT | Performed by: OBSTETRICS & GYNECOLOGY

## 2022-11-30 PROCEDURE — 99999 PR OFFICE/OUTPT VISIT,PROCEDURE ONLY: CPT | Performed by: OBSTETRICS & GYNECOLOGY

## 2022-12-07 ENCOUNTER — ROUTINE PRENATAL (OUTPATIENT)
Dept: OBGYN | Age: 32
End: 2022-12-07
Payer: MEDICAID

## 2022-12-07 VITALS — HEART RATE: 91 BPM | SYSTOLIC BLOOD PRESSURE: 118 MMHG | DIASTOLIC BLOOD PRESSURE: 79 MMHG

## 2022-12-07 DIAGNOSIS — O09.92 HRP (HIGH RISK PREGNANCY), SECOND TRIMESTER: Primary | ICD-10-CM

## 2022-12-07 DIAGNOSIS — Z3A.18 18 WEEKS GESTATION OF PREGNANCY: ICD-10-CM

## 2022-12-07 PROCEDURE — 99211 OFF/OP EST MAY X REQ PHY/QHP: CPT | Performed by: OBSTETRICS & GYNECOLOGY

## 2022-12-07 PROCEDURE — 1036F TOBACCO NON-USER: CPT | Performed by: OBSTETRICS & GYNECOLOGY

## 2022-12-07 PROCEDURE — G8484 FLU IMMUNIZE NO ADMIN: HCPCS | Performed by: OBSTETRICS & GYNECOLOGY

## 2022-12-07 PROCEDURE — G8427 DOCREV CUR MEDS BY ELIG CLIN: HCPCS | Performed by: OBSTETRICS & GYNECOLOGY

## 2022-12-07 PROCEDURE — 99213 OFFICE O/P EST LOW 20 MIN: CPT | Performed by: OBSTETRICS & GYNECOLOGY

## 2022-12-07 PROCEDURE — G8417 CALC BMI ABV UP PARAM F/U: HCPCS | Performed by: OBSTETRICS & GYNECOLOGY

## 2022-12-07 RX ORDER — VALACYCLOVIR HYDROCHLORIDE 500 MG/1
500 TABLET, FILM COATED ORAL 2 TIMES DAILY
Qty: 60 TABLET | Refills: 6 | Status: SHIPPED | OUTPATIENT
Start: 2022-12-07

## 2022-12-07 NOTE — PROGRESS NOTES
Emigdio Buenrostro is a 27 y.o. female 18w0d        OB History    Para Term  AB Living   2 1 0 1 0 1   SAB IAB Ectopic Molar Multiple Live Births   0 0 0 0 0 1      # Outcome Date GA Lbr Joe/2nd Weight Sex Delivery Anes PTL Lv   2 Current            1  08 29w0d  2 lb 11 oz (1.219 kg) F CS-Classical  Y RENEE      Obstetric Comments   G12008: Patient states she had had intercourse with FOB and she started bleeding very heavily. Patient reports she was placed on bedrest in the hospital, was there for two weeks, got up one day and was passing large clots, at which point she was advised she would be having an emergency C section. Patient reports being \"95% sure\" she was told that she would require another C section. She was told \"not everybody has to have another C section again, but you will. \" Patient amenable to signing release of records for [facility]  delivery. Vitals  BP: 118/79  Heart Rate: 91  Albumin: Negative  Glucose: Negative  Fetal HR: 150    The patient was seen and evaluated. There was negative fetal movements. No contractions or leakage of fluid. Signs and symptoms of  labor as well as labor were reviewed. The Nuchal Translucency testing was reviewed and found to be normal A single marker MSAFP was ordered. The patients anatomy ultrasound has been completed and reviewed with patient. The S/S of Pre-Eclampsia were reviewed with the patient in detail. She is to report any of these if they occur. She currently denies any of these. The patient is RH positive Rhogam Ordered no    The patient was instructed on fetal kick counts and was given a kick sheet to complete every 8 hours. This is to begin at 28 weeks gestation.  She was instructed that the baby should move at a minimum of ten times within one hour after a meal. The patient was instructed to lay down on her left side twenty minutes after eating and count movements for up to one hour with a target value of ten movements. She was instructed to notify the office if she did not make that target after two attempts or if after any attempt there was less than four movements. 10/26/22: RN/SW OB intake completed today --[facility]    Risk factors for current pregnancy: AMA; new partner; HSV, outbreak in first trimester; elevated blood pressure; pregravid BMI 35.73; H/O  delivery; H/O classical (?)  section; daily cigarette smoker; former marijuana user; unvaccinated for COVID. Last pap 3/5/2020, WNL Cultures at next visit   ASA Rx sent to patient's pharmacy per protocol (obesity, more than 10-year pregnancy interval). PNV Rx sent to patient's pharmacy per protocol. Initial prenatal labs ordered per protocol. One hour glucose tolerance ordered for early diabetic screening per protocol (pregravid BMI 35.73)  Referral to Maternal Fetal Medicine for anatomy scan and, if indicated, consult placed 10/26/22  Referral to Maternal Fetal Medicine for first trimester screen placed today, 10/26/22 at [facility] 120w0d  Order MSAFP 15w0d to 23w6d  Patient declines all vaccines in pregnancy    Patient his H/O  c section. Patient does not know with certainty but believes she was advised she must have a c section in next delivery. Patient must sign release of records for [facility] for 2008 delivery. Patient desires risk reducing bilateral salpingectomy. Assessment:  1. Colette Salamanca is a 27 y.o. female  2.   3. 18w0d    Patient Active Problem List    Diagnosis Date Noted    Dyslipidemia 10/26/2022     Priority: Medium    Advacned maternal age 10/26/2022     Priority: Medium    Cigarette smoker 10/26/2022     Priority: Medium     10/26/22: Patient reports having smoked a pack a day before pregnancy. Patient reports now smoking just \"a few\" cigarettes a day. Patient counseled on maternal/fetal risk factors.  Patient verbalizes understanding       Patient desires risk reducing bilateral salpingectomy 10/26/2022     Priority: Medium    H/O  delivery, currently pregnant 10/25/2022     Priority: Medium     2008: Patient states she had had intercourse with FOB and she started bleeding very heavily. Patient reports she was placed on bedrest in the hospital, was there for two weeks, got up one day and was passing large clots, at which point she was advised she would be having an emergency C section. Patient reports being \"95% sure\" she was told that she would require another C section. She was told \"not everybody has to have another C section again, but you will. \" Patient denies sha with bleeding. Patient amenable to signing release of records for [facility]  delivery. H/O classical  section x1 10/25/2022     Priority: Medium     G12008: Patient states she had had intercourse with FOB and she started bleeding very heavily. Patient reports she was placed on bedrest in the hospital, was there for two weeks, got up one day and was passing large clots, at which point she was advised she would be having an emergency C section. Patient reports being \"95% sure\" she was told that she would require another C section. She was told \"not everybody has to have another C section again, but you will. \" Patient denies sha with bleeding. Patient amenable to signing release of records for [facility]  delivery. H/O diverticulitis of descending colon 2021     Priority: Medium     10/26/22: Patient denies current symptoms      H/O peptic ulcer 2016     Priority: Medium     10/26/22: Patient reports frequent \"stomach aches\" but denies medications for symptoms at this time. Elevated early 1 hour GTT 2022     Early 1hr GTT: 148. Early 3 hour ordered. Family history of breast cancer 2022     Patient's mother.  Diagnosed age 54;       FHx of blood clots 2022     Patient's parents      High risk pregnancy, antepartum 10/26/2022     Fetal testing indicated: Yes  Fetal testing indication: Advanced maternal age  Fetal testing initiation: Individualize  Fetal testing frequency: Individualize     Fetal testing indicated: Yes  Fetal testing indication: Pregravid BMI >35  Fetal testing initiation: 37 0/7  Fetal testing frequency: Weekly      H/O marijuana use 10/26/2022     10/26/22: Patient denies marijuana use since just before 22, reports having been a \"light\" user (one to two times per month). Unvaccinated for covid-19 10/26/2022     10/26/22: Patient counseled on the the risks of not getting vaccinated in pregnancy against COVID-19. COVID-19 during pregnancy  increases the risk for adverse outcomes, including  birth and admission of the baby to an intensive care unit. Patient continues to decline vaccination. Bicornate uterus 10/26/2022     2020: \"Question mild bicornuate or septated appearance of the distal aspect of the endometrium. \"          cHTN (no meds) 10/26/2022     2021: 132/96  2020: 145/105      Pregravid BMI 35.73 2018     10/26/22: One hour glucose tolerance test ordered for early diabetic screening      Herpes genitalia 2017     2017: Initial outbreak  G2: Outbreak in first trimester, 10/10/22, approximately 9 weeks GA          Diagnosis Orders   1. HRP (high risk pregnancy), second trimester  Alpha Fetoprotein, Maternal      2. 18 weeks gestation of pregnancy              Plan:  The patient will return to the office for her next visit in 4 weeks. See antepartum flow sheet. Pt has had 3 herpes outbreaks since getting pregnant - we discussed this is because of her immune system being suppressed with the pregnancy. Will start her on valtrex suppression now and through the remainder of the pregnancy. Pt scheduled with [facility] 22    Ordered MSAFP    Pt counseled on TDAP in the future - gave her information on AVS.    Pt taking PNV and ASA 81mg and foltx.     Patient was seen with total face to face time of 20 minutes. More than 50% of this visit was on counseling and education regarding her    Diagnosis Orders   1. HRP (high risk pregnancy), second trimester  Alpha Fetoprotein, Maternal      2. 18 weeks gestation of pregnancy         and her options. She was also counseled on her preventative health maintenance recommendations and follow-up.     Serjio Lara, DO

## 2022-12-13 ENCOUNTER — HOSPITAL ENCOUNTER (OUTPATIENT)
Age: 32
Discharge: HOME | End: 2022-12-13

## 2022-12-13 DIAGNOSIS — R73.09 GLUCOSE TOLERANCE TEST ABNORMAL: ICD-10-CM

## 2022-12-13 DIAGNOSIS — O09.92 HRP (HIGH RISK PREGNANCY), SECOND TRIMESTER: ICD-10-CM

## 2022-12-14 ENCOUNTER — ROUTINE PRENATAL (OUTPATIENT)
Dept: PERINATAL CARE | Age: 32
End: 2022-12-14
Payer: MEDICAID

## 2022-12-14 VITALS
HEART RATE: 90 BPM | DIASTOLIC BLOOD PRESSURE: 67 MMHG | TEMPERATURE: 98 F | BODY MASS INDEX: 37.95 KG/M2 | RESPIRATION RATE: 16 BRPM | SYSTOLIC BLOOD PRESSURE: 116 MMHG | HEIGHT: 61 IN | WEIGHT: 201 LBS

## 2022-12-14 DIAGNOSIS — O44.40 LOW IMPLANTATION OF PLACENTA WITHOUT HEMORRHAGE: ICD-10-CM

## 2022-12-14 DIAGNOSIS — Z3A.19 19 WEEKS GESTATION OF PREGNANCY: ICD-10-CM

## 2022-12-14 DIAGNOSIS — O09.522 MULTIGRAVIDA OF ADVANCED MATERNAL AGE IN SECOND TRIMESTER: ICD-10-CM

## 2022-12-14 DIAGNOSIS — Q51.28 SEPTATE UTERUS AFFECTING PREGNANCY IN SECOND TRIMESTER: Primary | ICD-10-CM

## 2022-12-14 DIAGNOSIS — O99.212 OBESITY AFFECTING PREGNANCY IN SECOND TRIMESTER: ICD-10-CM

## 2022-12-14 DIAGNOSIS — O34.02 SEPTATE UTERUS AFFECTING PREGNANCY IN SECOND TRIMESTER: Primary | ICD-10-CM

## 2022-12-14 DIAGNOSIS — O16.2 HYPERTENSION AFFECTING PREGNANCY IN SECOND TRIMESTER: ICD-10-CM

## 2022-12-14 LAB
ABDOMINAL CIRCUMFERENCE: NORMAL
BIPARIETAL DIAMETER: NORMAL
ESTIMATED FETAL WEIGHT: NORMAL
FEMORAL DIAMETER: NORMAL
HC/AC: NORMAL
HEAD CIRCUMFERENCE: NORMAL

## 2022-12-14 PROCEDURE — 99999 PR OFFICE/OUTPT VISIT,PROCEDURE ONLY: CPT | Performed by: OBSTETRICS & GYNECOLOGY

## 2022-12-14 PROCEDURE — 76815 OB US LIMITED FETUS(S): CPT | Performed by: OBSTETRICS & GYNECOLOGY

## 2022-12-14 PROCEDURE — 76817 TRANSVAGINAL US OBSTETRIC: CPT | Performed by: OBSTETRICS & GYNECOLOGY

## 2022-12-14 RX ORDER — FOLIC ACID 1 MG/1
1 TABLET ORAL DAILY
COMMUNITY

## 2022-12-27 ENCOUNTER — HOSPITAL ENCOUNTER (OUTPATIENT)
Age: 32
Discharge: HOME | End: 2022-12-27
Payer: MEDICAID

## 2022-12-27 LAB
AMOUNT GLUCOSE GIVEN: ABNORMAL G
GLUCOSE FASTING: 88 MG/DL (ref 65–99)
GLUCOSE TOLERANCE TEST 1 HOUR: 169 MG/DL (ref 65–184)
GLUCOSE TOLERANCE TEST 2 HOUR: 142 MG/DL (ref 65–139)
GLUCOSE TOLERANCE TEST 3 HOUR: 158 MG/DL (ref 65–130)

## 2022-12-27 PROCEDURE — 82951 GLUCOSE TOLERANCE TEST (GTT): CPT

## 2022-12-27 PROCEDURE — 82952 GTT-ADDED SAMPLES: CPT

## 2022-12-27 PROCEDURE — 82105 ALPHA-FETOPROTEIN SERUM: CPT

## 2022-12-27 PROCEDURE — 36415 COLL VENOUS BLD VENIPUNCTURE: CPT

## 2022-12-28 ENCOUNTER — ROUTINE PRENATAL (OUTPATIENT)
Dept: PERINATAL CARE | Age: 32
End: 2022-12-28
Payer: MEDICAID

## 2022-12-28 VITALS
HEART RATE: 89 BPM | WEIGHT: 201 LBS | RESPIRATION RATE: 16 BRPM | HEIGHT: 61 IN | BODY MASS INDEX: 37.95 KG/M2 | DIASTOLIC BLOOD PRESSURE: 62 MMHG | SYSTOLIC BLOOD PRESSURE: 112 MMHG | TEMPERATURE: 97.8 F

## 2022-12-28 DIAGNOSIS — Q51.28 SEPTATE UTERUS AFFECTING PREGNANCY IN SECOND TRIMESTER: ICD-10-CM

## 2022-12-28 DIAGNOSIS — O09.522 MULTIGRAVIDA OF ADVANCED MATERNAL AGE IN SECOND TRIMESTER: ICD-10-CM

## 2022-12-28 DIAGNOSIS — O34.02 SEPTATE UTERUS AFFECTING PREGNANCY IN SECOND TRIMESTER: ICD-10-CM

## 2022-12-28 DIAGNOSIS — O99.212 OBESITY AFFECTING PREGNANCY IN SECOND TRIMESTER: Primary | ICD-10-CM

## 2022-12-28 PROCEDURE — 99999 PR OFFICE/OUTPT VISIT,PROCEDURE ONLY: CPT | Performed by: OBSTETRICS & GYNECOLOGY

## 2022-12-28 PROCEDURE — 76817 TRANSVAGINAL US OBSTETRIC: CPT | Performed by: OBSTETRICS & GYNECOLOGY

## 2022-12-28 PROCEDURE — 76816 OB US FOLLOW-UP PER FETUS: CPT | Performed by: OBSTETRICS & GYNECOLOGY

## 2022-12-28 NOTE — RESULT ENCOUNTER NOTE
Please let patient know that she passed her 3 hour and just one value was abnormal. Will need repeat at 24-28 weeks.

## 2022-12-29 LAB
AFP INTERPRETATION: NORMAL
AFP MOM: 1.05
AFP SPECIMEN: NORMAL
AFP: 57 NG/ML
DATE OF BIRTH: NORMAL
DATING METHOD: NORMAL
DETERMINED BY: NORMAL
DIABETIC: NEGATIVE
DONOR EGG?: NEGATIVE
DUE DATE: NORMAL
ESTIMATED DUE DATE: NORMAL
FAMILY HISTORY NTD: NEGATIVE
GESTATIONAL AGE: NORMAL
IN VITRO FERTILIZATION: NEGATIVE
INSULIN REQ DIABETES: NO
LAST MENSTRUAL PERIOD: NORMAL
MATERNAL AGE AT EDD: 35.1 YR
MATERNAL WEIGHT: 201
MONOCHORIONIC TWINS: NEGATIVE
NUMBER OF FETUSES: NORMAL
PATIENT WEIGHT UNITS: NORMAL
PATIENT WEIGHT: NORMAL
RACE (MATERNAL): NORMAL
RACE: NORMAL
REPEAT SPECIMEN?: NORMAL
SMOKING: NORMAL
SMOKING: NORMAL
VALPROIC/CARBAMAZEP: NORMAL
ZZ NTE CLEAN UP: HISTORY: NO

## 2023-01-11 ENCOUNTER — ROUTINE PRENATAL (OUTPATIENT)
Dept: PERINATAL CARE | Age: 33
End: 2023-01-11
Payer: MEDICAID

## 2023-01-11 VITALS
HEART RATE: 92 BPM | RESPIRATION RATE: 16 BRPM | BODY MASS INDEX: 36.06 KG/M2 | WEIGHT: 191 LBS | HEIGHT: 61 IN | SYSTOLIC BLOOD PRESSURE: 122 MMHG | TEMPERATURE: 97.3 F | DIASTOLIC BLOOD PRESSURE: 66 MMHG

## 2023-01-11 DIAGNOSIS — Z3A.23 23 WEEKS GESTATION OF PREGNANCY: ICD-10-CM

## 2023-01-11 DIAGNOSIS — O16.2 HYPERTENSION AFFECTING PREGNANCY IN SECOND TRIMESTER: ICD-10-CM

## 2023-01-11 DIAGNOSIS — O99.212 OBESITY AFFECTING PREGNANCY IN SECOND TRIMESTER: ICD-10-CM

## 2023-01-11 DIAGNOSIS — O34.02 SEPTATE UTERUS AFFECTING PREGNANCY IN SECOND TRIMESTER: ICD-10-CM

## 2023-01-11 DIAGNOSIS — O35.2XX0 HEREDITARY FAMILIAL DISEASE AFFECTING MANAGEMENT OF MOTHER AND POSSIBLY AFFECTING FETUS, ANTEPARTUM, SINGLE OR UNSPECIFIED FETUS: ICD-10-CM

## 2023-01-11 DIAGNOSIS — Q51.28 SEPTATE UTERUS AFFECTING PREGNANCY IN SECOND TRIMESTER: ICD-10-CM

## 2023-01-11 DIAGNOSIS — O09.522 MULTIGRAVIDA OF ADVANCED MATERNAL AGE IN SECOND TRIMESTER: Primary | ICD-10-CM

## 2023-01-11 PROCEDURE — 76811 OB US DETAILED SNGL FETUS: CPT | Performed by: OBSTETRICS & GYNECOLOGY

## 2023-01-11 PROCEDURE — 76817 TRANSVAGINAL US OBSTETRIC: CPT | Performed by: OBSTETRICS & GYNECOLOGY

## 2023-01-11 PROCEDURE — 99999 PR OFFICE/OUTPT VISIT,PROCEDURE ONLY: CPT | Performed by: OBSTETRICS & GYNECOLOGY

## 2023-01-19 ENCOUNTER — ROUTINE PRENATAL (OUTPATIENT)
Dept: OBGYN | Age: 33
End: 2023-01-19
Payer: MEDICAID

## 2023-01-19 ENCOUNTER — HOSPITAL ENCOUNTER (OUTPATIENT)
Age: 33
Setting detail: SPECIMEN
Discharge: HOME | End: 2023-01-19

## 2023-01-19 VITALS
HEART RATE: 85 BPM | SYSTOLIC BLOOD PRESSURE: 117 MMHG | WEIGHT: 193 LBS | BODY MASS INDEX: 36.47 KG/M2 | DIASTOLIC BLOOD PRESSURE: 75 MMHG

## 2023-01-19 DIAGNOSIS — R30.0 DYSURIA: ICD-10-CM

## 2023-01-19 DIAGNOSIS — Z3A.24 24 WEEKS GESTATION OF PREGNANCY: ICD-10-CM

## 2023-01-19 DIAGNOSIS — O09.92 HRP (HIGH RISK PREGNANCY), SECOND TRIMESTER: Primary | ICD-10-CM

## 2023-01-19 PROCEDURE — 1036F TOBACCO NON-USER: CPT | Performed by: OBSTETRICS & GYNECOLOGY

## 2023-01-19 PROCEDURE — G8417 CALC BMI ABV UP PARAM F/U: HCPCS | Performed by: OBSTETRICS & GYNECOLOGY

## 2023-01-19 PROCEDURE — G8484 FLU IMMUNIZE NO ADMIN: HCPCS | Performed by: OBSTETRICS & GYNECOLOGY

## 2023-01-19 PROCEDURE — G8427 DOCREV CUR MEDS BY ELIG CLIN: HCPCS | Performed by: OBSTETRICS & GYNECOLOGY

## 2023-01-19 PROCEDURE — 99213 OFFICE O/P EST LOW 20 MIN: CPT | Performed by: OBSTETRICS & GYNECOLOGY

## 2023-01-19 NOTE — PROGRESS NOTES
David Godwin is a 27 y.o. female 25w3d        OB History    Para Term  AB Living   2 1 0 1 0 1   SAB IAB Ectopic Molar Multiple Live Births   0 0 0 0 0 1      # Outcome Date GA Lbr Joe/2nd Weight Sex Delivery Anes PTL Lv   2 Current            1  08 29w0d  2 lb 11 oz (1.219 kg) F CS-Classical  Y RENEE      Obstetric Comments   G12008: Patient states she had had intercourse with FOB and she started bleeding very heavily. Patient reports she was placed on bedrest in the hospital, was there for two weeks, got up one day and was passing large clots, at which point she was advised she would be having an emergency C section. Patient reports being \"95% sure\" she was told that she would require another C section. She was told \"not everybody has to have another C section again, but you will. \" Patient amenable to signing release of records for [facility]  delivery. Vitals  BP: 117/75  Weight: 193 lb (87.5 kg)  Heart Rate: 85  Patient Position: Sitting  Movement: Present    The patient was seen and evaluated. There was positive fetal movements. No contractions or leakage of fluid. Signs and symptoms of  labor as well as labor were reviewed. The Nuchal Translucency testing was reviewed and found to be normal A single marker MSAFP was reviewed and found to be normal. The patients anatomy ultrasound has been completed and reviewed with patient. The S/S of Pre-Eclampsia were reviewed with the patient in detail. She is to report any of these if they occur. She currently denies any of these. The patient is RH positive Rhogam Ordered no    The patient was instructed on fetal kick counts and was given a kick sheet to complete every 8 hours. This is to begin at 28 weeks gestation.  She was instructed that the baby should move at a minimum of ten times within one hour after a meal. The patient was instructed to lay down on her left side twenty minutes after eating and count movements for up to one hour with a target value of ten movements. She was instructed to notify the office if she did not make that target after two attempts or if after any attempt there was less than four movements. 10/26/22: RN/SW OB intake completed today --[facility]    Risk factors for current pregnancy: AMA; new partner; HSV, outbreak in first trimester; elevated blood pressure; pregravid BMI 35.73; H/O  delivery; H/O classical (?)  section; daily cigarette smoker; former marijuana user; unvaccinated for COVID. Last pap 3/5/2020, WNL Cultures at next visit   ASA Rx sent to patient's pharmacy per protocol (obesity, more than 10-year pregnancy interval). PNV Rx sent to patient's pharmacy per protocol. Initial prenatal labs ordered per protocol. One hour glucose tolerance ordered for early diabetic screening per protocol (pregravid BMI 35.73)  Referral to Maternal Fetal Medicine for anatomy scan and, if indicated, consult placed 10/26/22  Referral to Maternal Fetal Medicine for first trimester screen placed today, 10/26/22 at [facility] 120w0d  Order MSAFP 15w0d to 23w6d  Patient declines all vaccines in pregnancy    Patient his H/O  c section. Patient does not know with certainty but believes she was advised she must have a c section in next delivery. Patient must sign release of records for [facility] for 2008 delivery. Patient desires risk reducing bilateral salpingectomy. Assessment:  1. Jayden Weaver is a 27 y.o. female  2.   3. 24w1d    Patient Active Problem List    Diagnosis Date Noted    Dyslipidemia 10/26/2022     Priority: Medium    Advacned maternal age 10/26/2022     Priority: Medium    Cigarette smoker 10/26/2022     Priority: Medium     10/26/22: Patient reports having smoked a pack a day before pregnancy. Patient reports now smoking just \"a few\" cigarettes a day. Patient counseled on maternal/fetal risk factors.  Patient verbalizes understanding    Patient desires risk reducing bilateral salpingectomy 10/26/2022     Priority: Medium    H/O  delivery, currently pregnant 10/25/2022     Priority: Medium     G12008: Patient states she had had intercourse with FOB and she started bleeding very heavily. Patient reports she was placed on bedrest in the hospital, was there for two weeks, got up one day and was passing large clots, at which point she was advised she would be having an emergency C section. Patient reports being \"95% sure\" she was told that she would require another C section. She was told \"not everybody has to have another C section again, but you will.\" Patient denies sha with bleeding. Patient amenable to signing release of records for 2008 delivery.          H/O classical  section x1 10/25/2022     Priority: Medium     G12008: Patient states she had had intercourse with FOB and she started bleeding very heavily. Patient reports she was placed on bedrest in the hospital, was there for two weeks, got up one day and was passing large clots, at which point she was advised she would be having an emergency C section. Patient reports being \"95% sure\" she was told that she would require another C section. She was told \"not everybody has to have another C section again, but you will.\" Patient denies sha with bleeding. Patient amenable to signing release of records for 2008 delivery.        H/O diverticulitis of descending colon 2021     Priority: Medium     10/26/22: Patient denies current symptoms      H/O peptic ulcer 2016     Priority: Medium     10/26/22: Patient reports frequent \"stomach aches\" but denies medications for symptoms at this time.      Elevated early 1 hour GTT 2022     Early 1hr GTT: 148. Early 3 hour ordered.   Early 3hr GTT: 1 value elevated, will need repeat 3 hour at 24-28 weeks.       Family history of breast cancer 2022     Patient's mother. Diagnosed age 55;        FHx of blood clots 2022     Patient's parents      High risk pregnancy, antepartum 10/26/2022     Fetal testing indicated: Yes  Fetal testing indication: Advanced maternal age  Fetal testing initiation: Individualize  Fetal testing frequency: Individualize     Fetal testing indicated: Yes  Fetal testing indication: Pregravid BMI >35  Fetal testing initiation: 37 0/7  Fetal testing frequency: Weekly      H/O marijuana use 10/26/2022     10/26/22: Patient denies marijuana use since just before 22, reports having been a \"light\" user (one to two times per month). Unvaccinated for covid-19 10/26/2022     10/26/22: Patient counseled on the the risks of not getting vaccinated in pregnancy against COVID-19. COVID-19 during pregnancy  increases the risk for adverse outcomes, including  birth and admission of the baby to an intensive care unit. Patient continues to decline vaccination. Bicornate uterus 10/26/2022     2020: \"Question mild bicornuate or septated appearance of the distal aspect of the endometrium. \"          cHTN (no meds) 10/26/2022     2021: 132/96  2020: 145/105      Pregravid BMI 35.73 2018     10/26/22: One hour glucose tolerance test ordered for early diabetic screening      Herpes genitalia 2017     2017: Initial outbreak  G2: Outbreak in first trimester, 10/10/22, approximately 9 weeks GA          Diagnosis Orders   1. HRP (high risk pregnancy), second trimester        2. 24 weeks gestation of pregnancy        3. Dysuria  Culture, Urine            Plan:  The patient will return to the office for her next visit in 4 weeks. See antepartum flow sheet. Pt c/o dysuria - will send urine for culture    Pt refused flu vaccine    Reviewed s/s of PTL, preeclampsia and decreased FM - encouraged pt to go to [facility] with any concerns. Pt scheduled to see [facility] 23    Gave pt lab slips for 28 week labs.     Pt counseled on risk reducing bilateral salpingectomy for ovarian cancer prevention - pt prefers to have this procedure done at the time of her repeat csection, which will be at 36 0/7 weeks because of her hx of prior classical incision. Discussed at length the risks and benefits of concurrent bilateral salpingectomy with patient's upcoming scheduled abdominal or pelvic surgery per recommendation of the Society of Gynecologic Oncology, American College of Obstetricians and Gynecologists as this patient is at above average risk for development of ovarian cancer. Advised patient that the primary benefit of such surgery is a 65% reduction in future risk of ovarian cancer. Patient advised that large scale studies have not demonstrated an increase in estimated blood loss, complications, or operating time but that bleeding, infection, and injury to nearby organs are potential complications with this additional surgery. Finally, patient has been thoroughly counseled regarding the consequence of loss of fertility following this procedure. Patient understands that this loss of fertility can not be reversed and has expressed via verbal and written consent that her wishes are to proceed with this surgery for the purposes of ovarian cancer reduction. Patient was seen with total face to face time of 20 minutes. More than 50% of this visit was on counseling and education regarding her    Diagnosis Orders   1. HRP (high risk pregnancy), second trimester        2. 24 weeks gestation of pregnancy        3. Dysuria  Culture, Urine       and her options. She was also counseled on her preventative health maintenance recommendations and follow-up.     Rusty Mcnamara, DO

## 2023-01-20 LAB
CULTURE: NORMAL
SPECIMEN DESCRIPTION: NORMAL

## 2023-02-15 ENCOUNTER — ROUTINE PRENATAL (OUTPATIENT)
Dept: OBGYN | Age: 33
End: 2023-02-15
Payer: MEDICAID

## 2023-02-15 VITALS
SYSTOLIC BLOOD PRESSURE: 126 MMHG | BODY MASS INDEX: 39.3 KG/M2 | WEIGHT: 208 LBS | DIASTOLIC BLOOD PRESSURE: 80 MMHG | HEART RATE: 118 BPM

## 2023-02-15 DIAGNOSIS — Z98.891 HISTORY OF CLASSICAL CESAREAN SECTION: ICD-10-CM

## 2023-02-15 DIAGNOSIS — O09.93 HRP (HIGH RISK PREGNANCY), THIRD TRIMESTER: Primary | ICD-10-CM

## 2023-02-15 DIAGNOSIS — Z3A.28 28 WEEKS GESTATION OF PREGNANCY: ICD-10-CM

## 2023-02-15 PROCEDURE — 1036F TOBACCO NON-USER: CPT | Performed by: OBSTETRICS & GYNECOLOGY

## 2023-02-15 PROCEDURE — G8417 CALC BMI ABV UP PARAM F/U: HCPCS | Performed by: OBSTETRICS & GYNECOLOGY

## 2023-02-15 PROCEDURE — 99211 OFF/OP EST MAY X REQ PHY/QHP: CPT | Performed by: OBSTETRICS & GYNECOLOGY

## 2023-02-15 PROCEDURE — G8484 FLU IMMUNIZE NO ADMIN: HCPCS | Performed by: OBSTETRICS & GYNECOLOGY

## 2023-02-15 PROCEDURE — G8427 DOCREV CUR MEDS BY ELIG CLIN: HCPCS | Performed by: OBSTETRICS & GYNECOLOGY

## 2023-02-15 PROCEDURE — 99213 OFFICE O/P EST LOW 20 MIN: CPT | Performed by: OBSTETRICS & GYNECOLOGY

## 2023-02-15 RX ORDER — BREAST PUMP
1 EACH MISCELLANEOUS PRN
Qty: 1 EACH | Refills: 0 | Status: SHIPPED | OUTPATIENT
Start: 2023-02-15

## 2023-02-15 NOTE — PROGRESS NOTES
Merly Baum is a 27 y.o. female 31w0d        OB History    Para Term  AB Living   2 1 0 1 0 1   SAB IAB Ectopic Molar Multiple Live Births   0 0 0 0 0 1      # Outcome Date GA Lbr Joe/2nd Weight Sex Delivery Anes PTL Lv   2 Current            1  08 29w0d  2 lb 11 oz (1.219 kg) F CS-Classical  Y RENEE      Obstetric Comments   2008: Patient states she had had intercourse with FOB and she started bleeding very heavily. Patient reports she was placed on bedrest in the hospital, was there for two weeks, got up one day and was passing large clots, at which point she was advised she would be having an emergency C section. Patient reports being \"95% sure\" she was told that she would require another C section. She was told \"not everybody has to have another C section again, but you will. \" Patient amenable to signing release of records for [facility]  delivery. Vitals  BP: 126/80  Weight: 208 lb (94.3 kg)  Heart Rate: (!) 118  Fundal Height (cm): 28 cm  Fetal HR: 145  Movement: Present      The patient was seen and evaluated. There was positive fetal movements. No contractions or leakage of fluid. Signs and symptoms of  labor as well as labor were reviewed. The S/S of Pre-Eclampsia were reviewed with the patient in detail. She is to report any of these if they occur. She currently denies any of these. The patient had her 28 week labs ordered. Hospital Outpatient Visit on 2023   Component Date Value Ref Range Status    Specimen Description 2023 . CLEAN CATCH URINE   Final    Culture 2023 NO SIGNIFICANT GROWTH   Final   ]    10/26/22: RN/SW OB intake completed today --[facility]    Risk factors for current pregnancy: AMA; new partner; HSV, outbreak in first trimester; elevated blood pressure; pregravid BMI 35.73; H/O  delivery; H/O classical (?)  section; daily cigarette smoker; former marijuana user; unvaccinated for COVID.     Last pap 3/5/2020, WNL Cultures at next visit   ASA Rx sent to patient's pharmacy per protocol (obesity, more than 10-year pregnancy interval). PNV Rx sent to patient's pharmacy per protocol. Initial prenatal labs ordered per protocol. One hour glucose tolerance ordered for early diabetic screening per protocol (pregravid BMI 35.73)  Referral to Maternal Fetal Medicine for anatomy scan and, if indicated, consult placed 10/26/22  Referral to Maternal Fetal Medicine for first trimester screen placed today, 10/26/22 at [facility] 120w0d  Order MSAFP 15w0d to 23w6d  Patient declines all vaccines in pregnancy    Patient his H/O  c section. Patient does not know with certainty but believes she was advised she must have a c section in next delivery. Patient must sign release of records for [facility] for 2008 delivery. Patient desires risk reducing bilateral salpingectomy. T-Dap Vaccine (27-36 weeks): pt not interested today, would like to read more - will give her information on her AVS    The patient was instructed on fetal kick counts and was given a kick sheet to complete every 8 hours. She was instructed that the baby should move at a minimum of ten times within one hour after a meal. The patient was instructed to lay down on her left side twenty minutes after eating and count movements for up to one hour with a target value of ten movements. She was instructed to notify the office if she did not make that target after two attempts or if after any attempt there was less than four movements. The patient reports that the targets have been made Yes.  Testing:  Not indicated    Assessment:  1. Steven Garcia is a 27 y.o. female  2.    3. 28w0d    Patient Active Problem List    Diagnosis Date Noted    Dyslipidemia 10/26/2022     Priority: Medium    Advacned maternal age 10/26/2022     Priority: Medium    Cigarette smoker 10/26/2022     Priority: Medium     10/26/22: Patient reports having smoked a pack a day before pregnancy. Patient reports now smoking just \"a few\" cigarettes a day. Patient counseled on maternal/fetal risk factors. Patient verbalizes understanding       Patient desires risk reducing bilateral salpingectomy 10/26/2022     Priority: Medium    H/O  delivery, currently pregnant 10/25/2022     Priority: Medium     2008: Patient states she had had intercourse with FOB and she started bleeding very heavily. Patient reports she was placed on bedrest in the hospital, was there for two weeks, got up one day and was passing large clots, at which point she was advised she would be having an emergency C section. Patient reports being \"95% sure\" she was told that she would require another C section. She was told \"not everybody has to have another C section again, but you will.\" Patient denies sha with bleeding. Patient amenable to signing release of records for 2008 delivery.          H/O classical  section x1 10/25/2022     Priority: Medium     Pt has been approved for RRBS    2008: Patient states she had had intercourse with FOB and she started bleeding very heavily. Patient reports she was placed on bedrest in the hospital, was there for two weeks, got up one day and was passing large clots, at which point she was advised she would be having an emergency C section. Patient reports being \"95% sure\" she was told that she would require another C section. She was told \"not everybody has to have another C section again, but you will.\" Patient denies sha with bleeding. Patient amenable to signing release of records for 2008 delivery.        H/O diverticulitis of descending colon 2021     Priority: Medium     10/26/22: Patient denies current symptoms      H/O peptic ulcer 2016     Priority: Medium     10/26/22: Patient reports frequent \"stomach aches\" but denies medications for symptoms at this time.      Elevated early 1 hour GTT  2022     Early 1hr GTT: 148. Early 3 hour ordered. Early 3hr GTT: 1 value elevated, will need repeat 3 hour at 24-28 weeks. Family history of breast cancer 2022     Patient's mother. Diagnosed age 54;       FHx of blood clots 2022     Patient's parents      High risk pregnancy, antepartum 10/26/2022     Fetal testing indicated: Yes  Fetal testing indication: Advanced maternal age  Fetal testing initiation: Individualize  Fetal testing frequency: Individualize     Fetal testing indicated: Yes  Fetal testing indication: Pregravid BMI >35  Fetal testing initiation: 37 0  Fetal testing frequency: Weekly      H/O marijuana use 10/26/2022     10/26/22: Patient denies marijuana use since just before 22, reports having been a \"light\" user (one to two times per month). Unvaccinated for covid-19 10/26/2022     10/26/22: Patient counseled on the the risks of not getting vaccinated in pregnancy against COVID-19. COVID-19 during pregnancy  increases the risk for adverse outcomes, including  birth and admission of the baby to an intensive care unit. Patient continues to decline vaccination. Bicornate uterus 10/26/2022     2020: \"Question mild bicornuate or septated appearance of the distal aspect of the endometrium. \"          cHTN (no meds) 10/26/2022     2021: 132/96  2020: 145/105      Pregravid BMI 35.73 2018     10/26/22: One hour glucose tolerance test ordered for early diabetic screening      Herpes genitalia 2017     2017: Initial outbreak  G2: Outbreak in first trimester, 10/10/22, approximately 9 weeks GA          Diagnosis Orders   1. HRP (high risk pregnancy), third trimester  CBC with Auto Differential      2. 28 weeks gestation of pregnancy  CBC with Auto Differential      3. H/O classical  section x1                  Plan:  The patient will return to the office for her next visit in 2 weeks. See antepartum flow sheet. Reviewed s/s of PTL, preeclampsia and decreased FM - encouraged pt to go to [facility] [place] with any concerns. Pt scheduled to see [facility] 23    Tdap - pt not interested today. Pt has been approved for [employer] [facility] [facility] on repeat csection with RRBS at 36 0/7  (2023) - will need to be scheduled in the future    Patient was seen with total face to face time of 20 minutes. More than 50% of this visit was on counseling and education regarding her    Diagnosis Orders   1. HRP (high risk pregnancy), third trimester  CBC with Auto Differential      2. 28 weeks gestation of pregnancy  CBC with Auto Differential      3. H/O classical  section x1         and her options. She was also counseled on her preventative health maintenance recommendations and follow-up.     Yovana Merino, DO

## 2023-02-28 ENCOUNTER — ROUTINE PRENATAL (OUTPATIENT)
Dept: PERINATAL CARE | Age: 33
End: 2023-02-28
Payer: MEDICAID

## 2023-02-28 VITALS
TEMPERATURE: 97.6 F | HEART RATE: 102 BPM | DIASTOLIC BLOOD PRESSURE: 67 MMHG | RESPIRATION RATE: 16 BRPM | BODY MASS INDEX: 39.27 KG/M2 | HEIGHT: 61 IN | WEIGHT: 208 LBS | SYSTOLIC BLOOD PRESSURE: 119 MMHG

## 2023-02-28 DIAGNOSIS — O99.213 OBESITY AFFECTING PREGNANCY IN THIRD TRIMESTER: ICD-10-CM

## 2023-02-28 DIAGNOSIS — O09.523 MULTIGRAVIDA OF ADVANCED MATERNAL AGE IN THIRD TRIMESTER: ICD-10-CM

## 2023-02-28 DIAGNOSIS — Q51.28 SEPTATE UTERUS AFFECTING PREGNANCY IN THIRD TRIMESTER: ICD-10-CM

## 2023-02-28 DIAGNOSIS — Z3A.29 29 WEEKS GESTATION OF PREGNANCY: ICD-10-CM

## 2023-02-28 DIAGNOSIS — O34.03 SEPTATE UTERUS AFFECTING PREGNANCY IN THIRD TRIMESTER: ICD-10-CM

## 2023-02-28 DIAGNOSIS — Z36.4 ULTRASOUND FOR ANTENATAL SCREENING FOR FETAL GROWTH RESTRICTION: ICD-10-CM

## 2023-02-28 DIAGNOSIS — O16.3 HYPERTENSION AFFECTING PREGNANCY IN THIRD TRIMESTER: Primary | ICD-10-CM

## 2023-02-28 PROCEDURE — 76816 OB US FOLLOW-UP PER FETUS: CPT | Performed by: OBSTETRICS & GYNECOLOGY

## 2023-02-28 PROCEDURE — 76820 UMBILICAL ARTERY ECHO: CPT | Performed by: OBSTETRICS & GYNECOLOGY

## 2023-02-28 PROCEDURE — 99999 PR OFFICE/OUTPT VISIT,PROCEDURE ONLY: CPT | Performed by: OBSTETRICS & GYNECOLOGY

## 2023-02-28 PROCEDURE — 76819 FETAL BIOPHYS PROFIL W/O NST: CPT | Performed by: OBSTETRICS & GYNECOLOGY

## 2023-03-01 ENCOUNTER — OFFICE VISIT (OUTPATIENT)
Dept: FAMILY MEDICINE CLINIC | Age: 33
End: 2023-03-01
Payer: MEDICAID

## 2023-03-01 ENCOUNTER — ROUTINE PRENATAL (OUTPATIENT)
Dept: OBGYN | Age: 33
End: 2023-03-01
Payer: MEDICAID

## 2023-03-01 VITALS
TEMPERATURE: 97.7 F | OXYGEN SATURATION: 98 % | SYSTOLIC BLOOD PRESSURE: 121 MMHG | BODY MASS INDEX: 39.3 KG/M2 | DIASTOLIC BLOOD PRESSURE: 81 MMHG | HEART RATE: 90 BPM | WEIGHT: 208 LBS

## 2023-03-01 VITALS
WEIGHT: 211 LBS | BODY MASS INDEX: 39.87 KG/M2 | SYSTOLIC BLOOD PRESSURE: 120 MMHG | DIASTOLIC BLOOD PRESSURE: 76 MMHG | HEART RATE: 108 BPM

## 2023-03-01 DIAGNOSIS — M25.531 WRIST PAIN, ACUTE, RIGHT: ICD-10-CM

## 2023-03-01 DIAGNOSIS — Z3A.29 29 WEEKS GESTATION OF PREGNANCY: ICD-10-CM

## 2023-03-01 DIAGNOSIS — M65.4 DE QUERVAIN'S TENOSYNOVITIS, RIGHT: Primary | ICD-10-CM

## 2023-03-01 DIAGNOSIS — Z3A.30 30 WEEKS GESTATION OF PREGNANCY: Primary | ICD-10-CM

## 2023-03-01 PROCEDURE — 1036F TOBACCO NON-USER: CPT | Performed by: NURSE PRACTITIONER

## 2023-03-01 PROCEDURE — G8417 CALC BMI ABV UP PARAM F/U: HCPCS | Performed by: NURSE PRACTITIONER

## 2023-03-01 PROCEDURE — G8484 FLU IMMUNIZE NO ADMIN: HCPCS | Performed by: NURSE PRACTITIONER

## 2023-03-01 PROCEDURE — 99213 OFFICE O/P EST LOW 20 MIN: CPT | Performed by: NURSE PRACTITIONER

## 2023-03-01 PROCEDURE — G8427 DOCREV CUR MEDS BY ELIG CLIN: HCPCS | Performed by: NURSE PRACTITIONER

## 2023-03-01 PROCEDURE — 99211 OFF/OP EST MAY X REQ PHY/QHP: CPT

## 2023-03-01 ASSESSMENT — PATIENT HEALTH QUESTIONNAIRE - PHQ9
SUM OF ALL RESPONSES TO PHQ9 QUESTIONS 1 & 2: 0
1. LITTLE INTEREST OR PLEASURE IN DOING THINGS: 0
SUM OF ALL RESPONSES TO PHQ QUESTIONS 1-9: 0
2. FEELING DOWN, DEPRESSED OR HOPELESS: 0
SUM OF ALL RESPONSES TO PHQ QUESTIONS 1-9: 0

## 2023-03-01 NOTE — PROGRESS NOTES
Prenatal Visit    Worth Opitz is a 27 y.o. female  at 30w0d    The patient was seen and evaluated. She presents for a MARCIA. She reports positive fetal movements. She denies contractions, vaginal bleeding and leakage of fluid. Signs and symptoms of labor and pre-eclampsia were reviewed with the patient in detail. She is to report any of these if they occur. She currently denies any of these. The patient is Rh positive and Rhogam is not indicated and informed the patient. The patient declined the T-Dap Vaccine (27-36 weeks) this pregnancy. The patient declined the influenza vaccine this year. The patient declined the COVID-19 vaccine this year. The problem list reflects the active issues addressed during today's visit    Vitals:    BP: 120/76  Weight: 211 lb (95.7 kg)  Heart Rate: (!) 108  Fundal Height (cm): 29 cm  Fetal HR: 140  Movement: Present     28 week labs: .  3hr GTT: Fasting 88/ 1hr 169/ 2hr 142/ 3hr 158   28 week CBC:   Lab Results   Component Value Date    WBC 11.8 (H) 2022    HGB 12.6 2022    HCT 37.9 2022    MCV 87.1 2022     2022     UA w/ Ur C&S: 23 negative     Assessment & Plan:  Worth Opitz is a 27 y.o. female  at 30w0d   - 29 week labs ordered, will need CBC   - Tdap vaccination: declined despite counseling    - Influenza vaccination: declined    - Rhogam: is not indicated in this pregnancy    - COVID-19 vaccination: R/B/A discussed with increased risk of both maternal and fetal morbidity and mortality in unvaccinated pregnant patients who contract COVID-19- patient declined today   -  testing indication starting 32 weeks GA: cHTN not on meds- scheduled for 3/15/23   -Indications for  section:  Classical  , ERAS protocols:  not yet reviewed   - Scheduled for RLTCS  at 0800   - Warning signs reviewed and recommendations when to call or present to the hospital if she experiences signs or symptoms of  labor and pre-eclampsia were reviewed. - The patient was instructed on fetal kick counts. She was instructed that the baby should move at a minimum of ten times within one hour after a meal. The patient was instructed to lay down on her left side twenty minutes after eating and count movements for up to one hour with a target value of ten movements. She was instructed to notify the office if she did not make that target after two attempts or if after any attempt there was less than four movements. Patient Active Problem List    Diagnosis Date Noted    Dyslipidemia 10/26/2022     Priority: Medium    Advacned maternal age 10/26/2022     Priority: Medium    Cigarette smoker 10/26/2022     Priority: Medium     10/26/22: Patient reports having smoked a pack a day before pregnancy. Patient reports now smoking just \"a few\" cigarettes a day. Patient counseled on maternal/fetal risk factors. Patient verbalizes understanding       Patient desires risk reducing bilateral salpingectomy 10/26/2022     Priority: Medium    H/O  delivery, currently pregnant 10/25/2022     Priority: Medium     2008: Patient states she had had intercourse with FOB and she started bleeding very heavily. Patient reports she was placed on bedrest in the hospital, was there for two weeks, got up one day and was passing large clots, at which point she was advised she would be having an emergency C section. Patient reports being \"95% sure\" she was told that she would require another C section. She was told \"not everybody has to have another C section again, but you will. \" Patient denies sha with bleeding. Patient amenable to signing release of records for [facility] 2008 delivery. H/O classical  section x1 10/25/2022     Priority: Medium     Pt has been approved for RRBS    [id number]: Patient states she had had intercourse with FOB and she started bleeding very heavily.  Patient reports she was placed on bedrest in the hospital, was there for two weeks, got up one day and was passing large clots, at which point she was advised she would be having an emergency C section. Patient reports being \"95% sure\" she was told that she would require another C section. She was told \"not everybody has to have another C section again, but you will. \" Patient denies sha with bleeding. Patient amenable to signing release of records for [facility] 2008 delivery. H/O diverticulitis of descending colon 2021     Priority: Medium     10/26/22: Patient denies current symptoms      H/O peptic ulcer 2016     Priority: Medium     10/26/22: Patient reports frequent \"stomach aches\" but denies medications for symptoms at this time. Elevated early 1 hour GTT 2022     Early 1hr GTT: 148. Early 3 hour ordered. Early 3hr GTT: 1 value elevated, will need repeat 3 hour at 24-28 weeks. Family history of breast cancer 2022     Patient's mother. Diagnosed age 54;       FHx of blood clots 2022     Patient's parents      High risk pregnancy, antepartum 10/26/2022     Fetal testing indicated: Yes  Fetal testing indication: Advanced maternal age  Fetal testing initiation: Individualize  Fetal testing frequency: Individualize     Fetal testing indicated: Yes  Fetal testing indication: Pregravid BMI >35  Fetal testing initiation: 37 0/7  Fetal testing frequency: Weekly      H/O marijuana use 10/26/2022     10/26/22: Patient denies marijuana use since just before 22, reports having been a \"light\" user (one to two times per month). Unvaccinated for covid-19 10/26/2022     10/26/22: Patient counseled on the the risks of not getting vaccinated in pregnancy against COVID-19. COVID-19 during pregnancy  increases the risk for adverse outcomes, including  birth and admission of the baby to an intensive care unit. Patient continues to decline vaccination.         Bicornate uterus 10/26/2022 5/13/2020: \"Question mild bicornuate or septated appearance of the distal aspect of the endometrium. \"          cHTN (no meds) 10/26/2022     7/6/2021: 132/96  7/1/2020: 145/105      Pregravid BMI 35.73 03/22/2018     10/26/22: One hour glucose tolerance test ordered for early diabetic screening      Herpes genitalia 02/2017     2017: Initial outbreak  G2: Outbreak in first trimester, 10/10/22, approximately 9 weeks GA       Return in about 2 weeks (around 3/15/2023) for MARCIATamie Nagel MD  Ob/Gyn Resident  [facility], [address]  3/1/2023, 2:01 PM

## 2023-03-01 NOTE — PROGRESS NOTES
JaneShahram   MR [id number]     69 yo M w pmhx of CAD, CKD Stage __  , T2DM (A1C.. ), COPD, CVA initially transferred from OSH for NSTEMI. Patient underwent LHC with charisella assistance and Patient unable to void at time of rooming. 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 Patient underwent LHC with impella assistance and      This is 69 yo male  who is Cymraes speaking male with PMH CAD s/p 12-14  stents placed [id number], T2 DM. HTN HLD, CVA 4 years ago with residual left sided facial weakness, radiculopathy with herniated disc, COPD, CKD creatine 2 months ago 1.2 ( nephrologist is Dr berg [phone]) , bipolar  ( no meds followed by psychiatry) , chronic kidney stones and has had urethral tents in the past who presented to [facility] hospital  with 2 days intractable right flank pain radiating  to the groin with with chills. Patient was admitted  with pyelonephritis started on ceftriaxone. He had LIN most likely secondary to  post obstructive neuropathy but he initially refused james and IR guided nephrostomy. Patient is now s/p james making no urine. Patient was started on dialysis there. Patient treated in the ICU for NSTEMI with TWI precordial and lateral leads  with increased troponin I  0.120>0.193 . Patient was also started on Heparin gtt, loaded with ASa and plavix, TTE with EF 35 %. He was intubated with hypoxic respiratory distress from pulm edema from the acute NSTEMI as well as treated for  Hypertensive emergency with NTG gtt.  Transferred to [facility]  for cardiac angiogram on Sept 12.     RRT called for hypotension and altered mental status.   Patient was unresponsive. No reaction to painful stimuli.  Non reactive pupils. Faint lung and heart sounds.  Patient was hypotensive to 68 systolic.   Patient had Dialysis earlier and 2L were removed.  Patient received dose of hydralazine today as well. Patient was started on 1L NS on pressor bag.  Blood work was repeated included cardiac enzymes and ABG. Patient's EKG was obtained.  Initially patient's HR was in the 70s but dropped to 20.  Patient lost pulse.   Cardiac compressions were initiated. Patient was given a total of 5 rounds of Epinephrine.  300 mg of Amiodarone during the third pulse check when he was in VT.  Patient went back to PEA and compressions were continued for the second two rounds.  Patient also received amp of bicarb and calcium.  Patient was intubated by anesthesia.  After the fifth pulse check, the code was stopped, no pulse, however couple minutes later patient's pulse came back.  Patient's initial BP was 120s systolic, Levophed was initiated. Patient was in VT and Amiodarone was started.  Patient was transferred to CCU for further care. Shahram Lara   MR [id number]     Cardiologist: Dr Hinojosa  Nephrologist: Dr Devlin       69 yo [place] and [place] Islander speaking M w pmhx of CAD s/p 14(?) stents in 2495-8512, CKD stage I 2mo ago w acute progression to ESRD on HD this admission (R JOURDAN Tanhal), T2DM (A1C 6.5), COPD, CVA 4 years ago (family denies and states no deficits at baseline), Bipolar Disorder (not on meds)  initially presented to OSH with 2 days of right flank pain radiating to the groin associated with chills. Patient was admitted for pyelonephritis and started on ceftriaxone. Patient has a hx of chronic kidney stones and has had urethral stents in the past. Initially patient had LIN presumably secondary to post obstructive nephropathy but refused james/nephrostomy. Patient subsequently stopped making urine and was started on HD. Hospital course was complicated by NSTEMI with TWI in precordial and lateral leads and troponin I 0.120, 0.193. Patient was started on heparin gtt, loaded with aspirin, plavix. EF on TTE was 35%. Patient had hypoxic respiratory distress from pulmonary edema in the setting of acute NSTEMI requiring intubation for 1 day and ICU monitoring for 3-4 days at the outside hospital. Patient was transferred to [facility] for PCI on Sep 12.     At [facility] patient underwent LHC with impella assistance and had BABAR x 3 to prox/mid/distal RCA and BABAR x 2 to prox/mid LAD. ...       RRT called for hypotension and altered mental status.   Patient was unresponsive. No reaction to painful stimuli.  Non reactive pupils. Faint lung and heart sounds.  Patient was hypotensive to 68 systolic.   Patient had Dialysis earlier and 2L were removed.  Patient received dose of hydralazine today as well. Patient was started on 1L NS on pressor bag.  Blood work was repeated included cardiac enzymes and ABG. Patient's EKG was obtained.  Initially patient's HR was in the 70s but dropped to 20.  Patient lost pulse.   Cardiac compressions were initiated. Patient was given a total of 5 rounds of Epinephrine.  300 mg of Amiodarone during the third pulse check when he was in VT.  Patient went back to PEA and compressions were continued for the second two rounds.  Patient also received amp of bicarb and calcium.  Patient was intubated by anesthesia.  After the fifth pulse check, the code was stopped, no pulse, however couple minutes later patient's pulse came back.  Patient's initial BP was 120s systolic, Levophed was initiated. Patient was in VT and Amiodarone was started.  Patient was transferred to CCU for further care. Shahram Lara   MR [id number]     Cardiologist: Dr Hinojosa  Nephrologist: Dr Devlin       69 yo Sudanese speaking M w pmhx of CAD s/p 14(?) stents in 9102-4354, CKD stage I 2mo ago w acute progression to ESRD on HD this admission (R JOURDAN Zeng), T2DM (A1C 6.5), COPD, CVA 4 years ago (family denies and states no deficits at baseline), Bipolar Disorder (not on meds)  initially presented to OSH with 2 days of right flank pain radiating to the groin associated with chills. Patient was admitted for pyelonephritis and started on ceftriaxone. Patient has a hx of chronic kidney stones and has had urethral stents in the past. Initially patient had LIN presumably secondary to post obstructive nephropathy but refused james/nephrostomy. Patient subsequently stopped making urine and was started on HD. Hospital course was complicated by NSTEMI with TWI in precordial and lateral leads and troponin I 0.120, 0.193. Patient was started on heparin gtt, loaded with aspirin, plavix. EF on TTE was 35%. Patient had hypoxic respiratory distress from pulmonary edema in the setting of acute NSTEMI requiring intubation for 1 day and ICU monitoring for 3-4 days at the outside hospital. Patient was transferred to [facility] for PCI on Sep 12.     At [facility] patient underwent LHC with impella assistance and had BABAR x 3 to prox/mid/distal RCA and BABAR x 2 to prox/mid LAD. Patient was continued on ceftriaxone and received HD almost daily. Hospital course was complicated by intermittent episodes of SOB resolving with brief NRB treatment. Shiley was removed for replacement on 9/19 due to difficulty with HD however, patient refused new shiley placement. At that time, potassium was uptrending into high 6 but patient absolutely refused HD and new catheter placement. On 9/20, patient developed new BRBPR episode. He was agreeable to shiley, and had it placed with subsequent HD. Patient syncopized during the HD and require fluid return. During this time, he developed new episode of afib/aflutter. He was given half a unit of blood. On 21st patient had no BRBPR and Hb stabilized. He had HD with 2L removed. Patient was also started on hydralazine and heparin sq.     RRT was called stef 10:45 pm for hypotension and altered mental status. Patient was unresponsive, had no reaction to painful stimuli. His pupils were non reactive, heart and lung sounds were faint. Patient was hypotensive to 68 systolic. Patient was started on 1L NS on pressor bag.  Initial EKG was NSR in 70 however quickly became 1st deg AV block with bradycardia and loss of pulse. Patient underwent CPR for 18 minutes with 5 rounds of epi, 300 mg Amio during 3rd pulse check w VT on monitor. Patient went back to PEA and compressions were continued for two rounds. Patient additionally received an amp of bicarb and calcium gluconate. Patient was intubated by anesthesia.  After the fifth pulse check, the code was stopped, no pulse, however couple minutes later patient's pulse came back.  Patient's initial BP was 120s systolic, Levophed was initiated. Patient was in VT and Amiodarone gtt was started.  Patient was transferred to CCU for further care.    In CCU, patient was awake, responsive to painful stimuli and combative though only moving left side of his body. His BP was in 160s systolic on levo. Levo was quickly titrated down. Patient's right sided reflexes and movement did not improve and code stroke was called. Patient went down for CT head which showed no acute hemorrhage. Patient was not a TPA candidate.    Plan:   # S/p cardiac arrest:  currently NSR, monitor on tele, trend cardiac enzymes. EKG concerning for new ST elevations in V3-V4 and new RBBB. Discussed with the fellow, will continue to trend enzyme and EKG. Will c/w amio gtt.              Unclear reason for cardiac arrest, but given the hx of HD complications including pt refusing HD for several days, may be related to electrolyte disturbance.     # Respiratory failure:  Will check ABG, titrate off vent as tolerated with trial of CPAP as pt overbreathign the vent  # Possible ischemic stroke: Neuro consult appreciated, will plan to have MRI tomorrow if stable otherwise CT head repeat in 24 hours. Possibly hypoperfusion related ischemia during code blue

## 2023-03-01 NOTE — PROGRESS NOTES
[address] [address]  Dept: [phone]  Dept Fax: [phone]    Jluis Canales is a 27 y.o. female who presents to the urgent care today for her medical conditions/complaints as notedbelow. Jluis Canales is c/o of Wrist Pain (Three weeks pain rt wrist )      HPI:     27 yr old female who is 29 weeks pregnant, presents for gradual onset intermittent rt wrist pain for 3 weeks. Occasionally seems swollen no hx injury, no discoloration  Pain to thumb side and travels up into wrist.   No n/t  Left hand dominant  Sx seem worse at night    Wrist Pain   The pain is present in the right wrist. This is a new problem. The current episode started 1 to 4 weeks ago (3 weeks). There has been no history of extremity trauma. The problem occurs intermittently. The problem has been waxing and waning. The quality of the pain is described as burning and aching. The pain is moderate. Associated symptoms include joint swelling (intermittent) and stiffness. Pertinent negatives include no fever, inability to bear weight, itching, joint locking, limited range of motion, numbness or tingling. The symptoms are aggravated by activity. She has tried acetaminophen and rest for the symptoms. The treatment provided mild relief. There is no history of diabetes, gout, osteoarthritis or rheumatoid arthritis.      Past Medical History:   Diagnosis Date    Chlamydia     HSV     Infertility, female      delivery     STD (sexually transmitted disease) 2017    Herpes; last outbreak 10/10/22    Uterine disorder         Current Outpatient Medications   Medication Sig Dispense Refill    folic acid (FOLVITE) 1 MG tablet Take 1 mg by mouth daily      valACYclovir (VALTREX) 500 MG tablet Take 1 tablet by mouth 2 times daily 60 tablet 6    aspirin EC 81 MG EC tablet Take 1 tablet by mouth daily 30 tablet 5    Prenatal Vit-DSS-Fe Fum-FA (PRENATAL 19) 29-1 MG TABS Take 1 tablet by mouth daily 30 tablet 11    Misc. Devices (BREAST PUMP) MISC 1 Units by Does not apply route as needed (breast pumping) (Patient not taking: Reported on 2/28/2023) 1 each 0     No current facility-administered medications for this visit. No Known Allergies    Subjective:      Review of Systems   Constitutional:  Negative for fever. Musculoskeletal:  Positive for stiffness. Negative for gout. Skin:  Negative for itching. Neurological:  Negative for tingling and numbness. All other systems reviewed and are negative. 14 systems reviewed and negative except as listed in HPI. Objective:     Physical Exam  Vitals and nursing note reviewed. Constitutional:       General: She is not in acute distress. Appearance: Normal appearance. She is not ill-appearing, toxic-appearing or diaphoretic. HENT:      Head: Normocephalic and atraumatic. Right Ear: External ear normal.      Left Ear: External ear normal.   Eyes:      General: No scleral icterus. Right eye: No discharge. Left eye: No discharge. Cardiovascular:      Rate and Rhythm: Normal rate. Pulses: Normal pulses. Pulmonary:      Effort: Pulmonary effort is normal.   Musculoskeletal:         General: Tenderness present. No swelling, deformity or signs of injury. Normal range of motion. Right wrist: Tenderness present. No swelling, deformity, effusion, lacerations, bony tenderness, snuff box tenderness or crepitus. Normal range of motion. Normal pulse. Arms:       Cervical back: Neck supple. Comments: + finklesteins test  No swelling, bony tenderness or stepoffs, no discoloration  No tenderness to ulnar side or volar wrist  All tenderness to radial side wrost       Skin:     General: Skin is warm and dry. Capillary Refill: Capillary refill takes less than 2 seconds. Findings: No erythema.    Neurological:      General: No focal deficit present. Mental Status: She is alert. Psychiatric:         Mood and Affect: Mood normal.     /81   Pulse 90   Temp 97.7 °F (36.5 °C)   Wt 208 lb (94.3 kg)   LMP 08/03/2022 (Exact Date)   SpO2 98%   BMI 39.30 kg/m²     Assessment:       Diagnosis Orders   1. De Quervain's tenosynovitis, right  [employer] Thumb Spica, Right      2. Wrist pain, acute, right  [employer] Thumb Spica, Right      3. 29 weeks gestation of pregnancy            Plan:    34 week pregnant female with rt radial wrist tenderness, no hx injury, sx intermittent 3 weeks, sx wax and wane. Neuro intact, exam findings consistent with De Quervains tenosynovitis  Tx options limited due to pregnancy  Tylenol  Elevate  Ice  Velcro wrist splint given here  Sx persist, f/u pcp  Return if symptoms worsen or fail to improve, for Make an Appt. with your Primary Care in 1 week. No orders of the defined types were placed in this encounter. Patient given educational materials - see patient instructions. Discussed use, benefit, and side effects of prescribed medications. All patient questions answered. Pt voicedunderstanding.     Electronically signed by TOBY Richmond CNP on 3/1/2023 at 9:59 AM

## 2023-03-03 NOTE — PROGRESS NOTES
Attending Physician Statement  I have discussed the care of Adonis Torrez, including pertinent history and exam findings,  with the resident. I have reviewed the key elements of all parts of the encounter with the resident. I agree with the assessment, plan and orders as documented by the resident.   (GE Modifier)    Young Finnegan, DO Patient instructed to:  Bring Picture ID, insurance card, proof of address  Dress Comfortable  No jewelry  No Makeup  No contacts  Shower evening before or morning of surgery with antibacterial soap. Nothing to eat or drink after midnight the day before surgery. If instructed by MD to take meds day of surgery, take with only a sip of water. If taking am beta blocker, take morning of surgery with sip of water per Dr. Tasneem Snow.

## 2023-03-14 ENCOUNTER — HOSPITAL ENCOUNTER (OUTPATIENT)
Age: 33
Setting detail: SPECIMEN
Discharge: HOME | End: 2023-03-14

## 2023-03-14 ENCOUNTER — ROUTINE PRENATAL (OUTPATIENT)
Dept: OBGYN | Age: 33
End: 2023-03-14
Payer: MEDICAID

## 2023-03-14 VITALS
DIASTOLIC BLOOD PRESSURE: 83 MMHG | BODY MASS INDEX: 39.91 KG/M2 | WEIGHT: 211.2 LBS | SYSTOLIC BLOOD PRESSURE: 120 MMHG | HEART RATE: 110 BPM

## 2023-03-14 DIAGNOSIS — Z3A.31 31 WEEKS GESTATION OF PREGNANCY: ICD-10-CM

## 2023-03-14 DIAGNOSIS — O09.93 HRP (HIGH RISK PREGNANCY), THIRD TRIMESTER: ICD-10-CM

## 2023-03-14 DIAGNOSIS — O09.93 HRP (HIGH RISK PREGNANCY), THIRD TRIMESTER: Primary | ICD-10-CM

## 2023-03-14 DIAGNOSIS — Z3A.28 28 WEEKS GESTATION OF PREGNANCY: ICD-10-CM

## 2023-03-14 LAB
ABSOLUTE EOS #: 0.22 K/UL (ref 0–0.44)
ABSOLUTE IMMATURE GRANULOCYTE: 0.13 K/UL (ref 0–0.3)
ABSOLUTE LYMPH #: 2.64 K/UL (ref 1.1–3.7)
ABSOLUTE MONO #: 0.95 K/UL (ref 0.1–1.2)
BASOPHILS # BLD: 0 % (ref 0–2)
BASOPHILS ABSOLUTE: 0.03 K/UL (ref 0–0.2)
EOSINOPHILS RELATIVE PERCENT: 2 % (ref 1–4)
HCT VFR BLD AUTO: 32.9 % (ref 36.3–47.1)
HGB BLD-MCNC: 10.7 G/DL (ref 11.9–15.1)
IMMATURE GRANULOCYTES: 1 %
LYMPHOCYTES # BLD: 20 % (ref 24–43)
MCH RBC QN AUTO: 27.7 PG (ref 25.2–33.5)
MCHC RBC AUTO-ENTMCNC: 32.5 G/DL (ref 28.4–34.8)
MCV RBC AUTO: 85.2 FL (ref 82.6–102.9)
MONOCYTES # BLD: 7 % (ref 3–12)
NRBC AUTOMATED: 0 PER 100 WBC
PDW BLD-RTO: 14.3 % (ref 11.8–14.4)
PLATELET # BLD AUTO: 247 K/UL (ref 138–453)
PMV BLD AUTO: 11.9 FL (ref 8.1–13.5)
RBC # BLD: 3.86 M/UL (ref 3.95–5.11)
SEG NEUTROPHILS: 70 % (ref 36–65)
SEGMENTED NEUTROPHILS ABSOLUTE COUNT: 9.14 K/UL (ref 1.5–8.1)
WBC # BLD AUTO: 13.1 K/UL (ref 3.5–11.3)

## 2023-03-14 PROCEDURE — 99213 OFFICE O/P EST LOW 20 MIN: CPT | Performed by: OBSTETRICS & GYNECOLOGY

## 2023-03-14 PROCEDURE — G8427 DOCREV CUR MEDS BY ELIG CLIN: HCPCS | Performed by: OBSTETRICS & GYNECOLOGY

## 2023-03-14 PROCEDURE — 1036F TOBACCO NON-USER: CPT | Performed by: OBSTETRICS & GYNECOLOGY

## 2023-03-14 PROCEDURE — 99211 OFF/OP EST MAY X REQ PHY/QHP: CPT | Performed by: OBSTETRICS & GYNECOLOGY

## 2023-03-14 PROCEDURE — G8417 CALC BMI ABV UP PARAM F/U: HCPCS | Performed by: OBSTETRICS & GYNECOLOGY

## 2023-03-14 PROCEDURE — G8484 FLU IMMUNIZE NO ADMIN: HCPCS | Performed by: OBSTETRICS & GYNECOLOGY

## 2023-03-14 NOTE — LETTER
2023       Ninoska Clifton YOB: 1990   [id number] Date of Visit:  3/14/2023       To Whom It May Concern:    Please know that Alen Morataya is scheduled for her repeat  on 23. This will be her delivery date, unless she would need to be delivered sooner for a medical indication. If you have any questions or concerns, please don't hesitate to call.     Sincerely,        Lauren Nava, DO

## 2023-03-14 NOTE — LETTER
2023       Leanord Sacks  [id number] [facility] [address] [employer] Parents Reggie's partner, Ladarius Ross, is scheduled for a repeat  on 23. He will be taking paternity leave starting that day through 23. Thank you for your understanding. If you have any questions or concerns, please don't hesitate to call.     Sincerely,        Arslan Estrada, DO

## 2023-03-14 NOTE — PROGRESS NOTES
Gurdeep Hitchcock is a 27 y.o. female 31w6d        OB History    Para Term  AB Living   2 1 0 1 0 1   SAB IAB Ectopic Molar Multiple Live Births   0 0 0 0 0 1      # Outcome Date GA Lbr Joe/2nd Weight Sex Delivery Anes PTL Lv   2 Current            1  08 29w0d  2 lb 11 oz (1.219 kg) F CS-Classical  Y RENEE      Obstetric Comments   G12008: Patient states she had had intercourse with FOB and she started bleeding very heavily. Patient reports she was placed on bedrest in the hospital, was there for two weeks, got up one day and was passing large clots, at which point she was advised she would be having an emergency C section. Patient reports being \"95% sure\" she was told that she would require another C section. She was told \"not everybody has to have another C section again, but you will. \" Patient amenable to signing release of records for [facility]  delivery. Vitals  BP: 120/83  Weight: 211 lb 3.2 oz (95.8 kg)  Heart Rate: (!) 110  Patient Position: Sitting  Fundal Height (cm): 31 cm  Fetal HR: 140  Movement: Present      The patient was seen and evaluated. There was positive fetal movements. No contractions or leakage of fluid. Signs and symptoms of  labor as well as labor were reviewed. The S/S of Pre-Eclampsia were reviewed with the patient in detail. She is to report any of these if they occur. She currently denies any of these. The patient had her 28 week labs ordered. No visits with results within 5 Week(s) from this visit. Latest known visit with results is:   Hospital Outpatient Visit on 2023   Component Date Value Ref Range Status    Specimen Description 2023 . CLEAN CATCH URINE   Final    Culture 2023 NO SIGNIFICANT GROWTH   Final   ]    10/26/22: RN/SW OB intake completed today --[facility]    Risk factors for current pregnancy: AMA; new partner; HSV, outbreak in first trimester; elevated blood pressure; pregravid BMI 35.73; H/O  delivery; H/O classical (?)  section; daily cigarette smoker; former marijuana user; unvaccinated for COVID. Last pap 3/5/2020, WNL Cultures at next visit   ASA Rx sent to patient's pharmacy per protocol (obesity, more than 10-year pregnancy interval). PNV Rx sent to patient's pharmacy per protocol. Initial prenatal labs ordered per protocol. One hour glucose tolerance ordered for early diabetic screening per protocol (pregravid BMI 35.73)  Referral to Maternal Fetal Medicine for anatomy scan and, if indicated, consult placed 10/26/22  Referral to Maternal Fetal Medicine for first trimester screen placed today, 10/26/22 at [facility] 120w0d  Order MSAFP 15w0d to 23w6d  Patient declines all vaccines in pregnancy    Patient his H/O  c section. Patient does not know with certainty but believes she was advised she must have a c section in next delivery. Patient must sign release of records for [facility] for 2008 delivery. Patient desires risk reducing bilateral salpingectomy. T-Dap Vaccine (27-36 weeks): declined    The patient was instructed on fetal kick counts and was given a kick sheet to complete every 8 hours. She was instructed that the baby should move at a minimum of ten times within one hour after a meal. The patient was instructed to lay down on her left side twenty minutes after eating and count movements for up to one hour with a target value of ten movements. She was instructed to notify the office if she did not make that target after two attempts or if after any attempt there was less than four movements. The patient reports that the targets have been made Yes. Assessment:  1. Keo Ely is a 27 y.o. female  2.    3. 31w6d    Patient Active Problem List    Diagnosis Date Noted    Dyslipidemia 10/26/2022     Priority: Medium    Advacned maternal age 10/26/2022     Priority: Medium    Cigarette smoker 10/26/2022     Priority: Medium     10/26/22: Patient reports having smoked a pack a day before pregnancy. Patient reports now smoking just \"a few\" cigarettes a day. Patient counseled on maternal/fetal risk factors. Patient verbalizes understanding       Patient desires risk reducing bilateral salpingectomy 10/26/2022     Priority: Medium    H/O  delivery, currently pregnant 10/25/2022     Priority: Medium     G12008: Patient states she had had intercourse with FOB and she started bleeding very heavily. Patient reports she was placed on bedrest in the hospital, was there for two weeks, got up one day and was passing large clots, at which point she was advised she would be having an emergency C section. Patient reports being \"95% sure\" she was told that she would require another C section. She was told \"not everybody has to have another C section again, but you will. \" Patient denies sha with bleeding. Patient amenable to signing release of records for [facility]  delivery. H/O classical  section x1 10/25/2022     Priority: Medium     Pt has been approved for RRBS    2008: Patient states she had had intercourse with FOB and she started bleeding very heavily. Patient reports she was placed on bedrest in the hospital, was there for two weeks, got up one day and was passing large clots, at which point she was advised she would be having an emergency C section. Patient reports being \"95% sure\" she was told that she would require another C section. She was told \"not everybody has to have another C section again, but you will. \" Patient denies sha with bleeding. Patient amenable to signing release of records for [facility]  delivery. H/O diverticulitis of descending colon 2021     Priority: Medium     10/26/22: Patient denies current symptoms      H/O peptic ulcer 2016     Priority: Medium     10/26/22: Patient reports frequent \"stomach aches\" but denies medications for symptoms at this time.       Elevated early 1 hour GTT 2022     Early 1hr GTT: 148. Early 3 hour ordered. Early 3hr GTT: 1 value elevated, will need repeat 3 hour at 24-28 weeks. Family history of breast cancer 2022     Patient's mother. Diagnosed age 54;       FHx of blood clots 2022     Patient's parents      High risk pregnancy, antepartum 10/26/2022     Fetal testing indicated: Yes  Fetal testing indication: Advanced maternal age  Fetal testing initiation: Individualize  Fetal testing frequency: Individualize     Fetal testing indicated: Yes  Fetal testing indication: Pregravid BMI >35  Fetal testing initiation: 37 0/7  Fetal testing frequency: Weekly      H/O marijuana use 10/26/2022     10/26/22: Patient denies marijuana use since just before 22, reports having been a \"light\" user (one to two times per month). Unvaccinated for covid-19 10/26/2022     10/26/22: Patient counseled on the the risks of not getting vaccinated in pregnancy against COVID-19. COVID-19 during pregnancy  increases the risk for adverse outcomes, including  birth and admission of the baby to an intensive care unit. Patient continues to decline vaccination. Bicornate uterus 10/26/2022     2020: \"Question mild bicornuate or septated appearance of the distal aspect of the endometrium. \"          cHTN (no meds) 10/26/2022     2021: 132/96  2020: 145/105      Pregravid BMI 35.73 2018     10/26/22: One hour glucose tolerance test ordered for early diabetic screening      Herpes genitalia 2017     2017: Initial outbreak  G2: Outbreak in first trimester, 10/10/22, approximately 9 weeks GA          Diagnosis Orders   1. HRP (high risk pregnancy), third trimester        2. 31 weeks gestation of pregnancy                  Plan:  The patient will return to the office for her next visit in 2 weeks. See antepartum flow sheet.      Reviewed s/s of PTL, preeclampsia and decreased FM - encouraged pt to go to [facility]. [place] with any concerns. Pt scheduled with M tomorrow    Pt's Csection scheduled 4/18/23 at 8am, pt knows to be there at [facility] [address] letters for her and her partner's work for time off starting the day of her expected csection. Patient was seen with total face to face time of 20 minutes. More than 50% of this visit was on counseling and education regarding her    Diagnosis Orders   1. HRP (high risk pregnancy), third trimester        2. 31 weeks gestation of pregnancy         and her options. She was also counseled on her preventative health maintenance recommendations and follow-up.     Jennifer Mckenzie DO

## 2023-03-15 ENCOUNTER — ROUTINE PRENATAL (OUTPATIENT)
Dept: PERINATAL CARE | Age: 33
End: 2023-03-15
Payer: MEDICAID

## 2023-03-15 VITALS
TEMPERATURE: 97.3 F | HEART RATE: 108 BPM | WEIGHT: 211 LBS | RESPIRATION RATE: 16 BRPM | SYSTOLIC BLOOD PRESSURE: 124 MMHG | DIASTOLIC BLOOD PRESSURE: 80 MMHG | BODY MASS INDEX: 39.84 KG/M2 | HEIGHT: 61 IN

## 2023-03-15 DIAGNOSIS — O09.523 MULTIGRAVIDA OF ADVANCED MATERNAL AGE IN THIRD TRIMESTER: ICD-10-CM

## 2023-03-15 DIAGNOSIS — Z3A.32 32 WEEKS GESTATION OF PREGNANCY: ICD-10-CM

## 2023-03-15 DIAGNOSIS — O99.213 OBESITY AFFECTING PREGNANCY IN THIRD TRIMESTER: ICD-10-CM

## 2023-03-15 DIAGNOSIS — O16.3 HYPERTENSION AFFECTING PREGNANCY IN THIRD TRIMESTER: Primary | ICD-10-CM

## 2023-03-15 PROCEDURE — 76819 FETAL BIOPHYS PROFIL W/O NST: CPT | Performed by: OBSTETRICS & GYNECOLOGY

## 2023-03-15 PROCEDURE — 76820 UMBILICAL ARTERY ECHO: CPT | Performed by: OBSTETRICS & GYNECOLOGY

## 2023-03-15 PROCEDURE — 99999 PR OFFICE/OUTPT VISIT,PROCEDURE ONLY: CPT | Performed by: OBSTETRICS & GYNECOLOGY

## 2023-03-22 ENCOUNTER — ROUTINE PRENATAL (OUTPATIENT)
Dept: PERINATAL CARE | Age: 33
End: 2023-03-22
Payer: MEDICAID

## 2023-03-22 VITALS
HEART RATE: 107 BPM | RESPIRATION RATE: 16 BRPM | BODY MASS INDEX: 39.46 KG/M2 | SYSTOLIC BLOOD PRESSURE: 121 MMHG | HEIGHT: 61 IN | WEIGHT: 209 LBS | TEMPERATURE: 97.6 F | DIASTOLIC BLOOD PRESSURE: 73 MMHG

## 2023-03-22 DIAGNOSIS — O99.213 OBESITY AFFECTING PREGNANCY IN THIRD TRIMESTER: ICD-10-CM

## 2023-03-22 DIAGNOSIS — O09.523 MULTIGRAVIDA OF ADVANCED MATERNAL AGE IN THIRD TRIMESTER: ICD-10-CM

## 2023-03-22 DIAGNOSIS — Z13.89 ENCOUNTER FOR ROUTINE SCREENING FOR MALFORMATION USING ULTRASONICS: ICD-10-CM

## 2023-03-22 DIAGNOSIS — O34.03 SEPTATE UTERUS AFFECTING PREGNANCY IN THIRD TRIMESTER: ICD-10-CM

## 2023-03-22 DIAGNOSIS — O35.9XX0 FETAL ABNORMALITY AFFECTING MANAGEMENT OF MOTHER, SINGLE OR UNSPECIFIED FETUS: ICD-10-CM

## 2023-03-22 DIAGNOSIS — O16.3 HYPERTENSION AFFECTING PREGNANCY IN THIRD TRIMESTER: Primary | ICD-10-CM

## 2023-03-22 DIAGNOSIS — Q51.28 SEPTATE UTERUS AFFECTING PREGNANCY IN THIRD TRIMESTER: ICD-10-CM

## 2023-03-22 DIAGNOSIS — O09.93 HRP (HIGH RISK PREGNANCY), THIRD TRIMESTER: Primary | ICD-10-CM

## 2023-03-22 DIAGNOSIS — Z36.4 ULTRASOUND FOR ANTENATAL SCREENING FOR FETAL GROWTH RESTRICTION: ICD-10-CM

## 2023-03-22 DIAGNOSIS — Z3A.33 33 WEEKS GESTATION OF PREGNANCY: ICD-10-CM

## 2023-03-22 PROCEDURE — 76819 FETAL BIOPHYS PROFIL W/O NST: CPT | Performed by: OBSTETRICS & GYNECOLOGY

## 2023-03-22 PROCEDURE — 99999 PR OFFICE/OUTPT VISIT,PROCEDURE ONLY: CPT | Performed by: OBSTETRICS & GYNECOLOGY

## 2023-03-22 PROCEDURE — 76805 OB US >/= 14 WKS SNGL FETUS: CPT | Performed by: OBSTETRICS & GYNECOLOGY

## 2023-03-22 PROCEDURE — 76820 UMBILICAL ARTERY ECHO: CPT | Performed by: OBSTETRICS & GYNECOLOGY

## 2023-03-22 NOTE — PROGRESS NOTES
Obstetric/Gynecology Maternal Fetal Medicine Resident Note    Patient has been informed of new ultrasound finding of dilated fetal bowel. Patient is amenable to  formal consult with MFM attending physician for dilated bowel on ultrasound.      Jules Cleveland DO  OBGYMICH Resident, PGY1  [facility]  3/22/2023, 12:19 PM

## 2023-03-23 ENCOUNTER — TELEPHONE (OUTPATIENT)
Dept: OBGYN | Age: 33
End: 2023-03-23

## 2023-03-29 ENCOUNTER — ROUTINE PRENATAL (OUTPATIENT)
Dept: PERINATAL CARE | Age: 33
End: 2023-03-29
Payer: MEDICAID

## 2023-03-29 VITALS
HEART RATE: 97 BPM | DIASTOLIC BLOOD PRESSURE: 72 MMHG | RESPIRATION RATE: 16 BRPM | HEIGHT: 61 IN | WEIGHT: 210 LBS | BODY MASS INDEX: 39.65 KG/M2 | SYSTOLIC BLOOD PRESSURE: 130 MMHG | TEMPERATURE: 97.6 F

## 2023-03-29 DIAGNOSIS — O35.9XX0 FETAL ABNORMALITY AFFECTING MANAGEMENT OF MOTHER, SINGLE OR UNSPECIFIED FETUS: ICD-10-CM

## 2023-03-29 DIAGNOSIS — Z3A.34 34 WEEKS GESTATION OF PREGNANCY: ICD-10-CM

## 2023-03-29 DIAGNOSIS — Q51.28 SEPTATE UTERUS AFFECTING PREGNANCY IN THIRD TRIMESTER: ICD-10-CM

## 2023-03-29 DIAGNOSIS — O09.523 MULTIGRAVIDA OF ADVANCED MATERNAL AGE IN THIRD TRIMESTER: ICD-10-CM

## 2023-03-29 DIAGNOSIS — O34.03 SEPTATE UTERUS AFFECTING PREGNANCY IN THIRD TRIMESTER: ICD-10-CM

## 2023-03-29 DIAGNOSIS — O16.3 HYPERTENSION AFFECTING PREGNANCY IN THIRD TRIMESTER: Primary | ICD-10-CM

## 2023-03-29 DIAGNOSIS — O99.213 OBESITY AFFECTING PREGNANCY IN THIRD TRIMESTER: ICD-10-CM

## 2023-03-29 PROCEDURE — 99999 PR OFFICE/OUTPT VISIT,PROCEDURE ONLY: CPT | Performed by: OBSTETRICS & GYNECOLOGY

## 2023-03-29 PROCEDURE — 76815 OB US LIMITED FETUS(S): CPT | Performed by: OBSTETRICS & GYNECOLOGY

## 2023-03-29 PROCEDURE — 76820 UMBILICAL ARTERY ECHO: CPT | Performed by: OBSTETRICS & GYNECOLOGY

## 2023-03-29 PROCEDURE — 76819 FETAL BIOPHYS PROFIL W/O NST: CPT | Performed by: OBSTETRICS & GYNECOLOGY

## 2023-03-30 ENCOUNTER — ROUTINE PRENATAL (OUTPATIENT)
Dept: OBGYN | Age: 33
End: 2023-03-30

## 2023-03-30 VITALS
SYSTOLIC BLOOD PRESSURE: 112 MMHG | WEIGHT: 214 LBS | BODY MASS INDEX: 40.43 KG/M2 | DIASTOLIC BLOOD PRESSURE: 71 MMHG | HEART RATE: 89 BPM

## 2023-03-30 DIAGNOSIS — Z3A.34 34 WEEKS GESTATION OF PREGNANCY: Primary | ICD-10-CM

## 2023-03-30 DIAGNOSIS — O99.810 ABNORMAL GLUCOSE TOLERANCE TEST (GTT) DURING PREGNANCY, ANTEPARTUM: ICD-10-CM

## 2023-03-30 NOTE — PROGRESS NOTES
I have discussed the care of the patient including pertinent history and examination findings with the resident. I agree with the assessment, and and orders as documented  by the resident. GE Modifier: This service has been performed by a resident physician under the direction of a teaching physician.
Pt unable to void.
145/105      Pregravid BMI 35.73 03/22/2018     10/26/22: One hour glucose tolerance test ordered for early diabetic screening      Herpes genitalia 02/2017     2017: Initial outbreak  G2: Outbreak in first trimester, 10/10/22, approximately 9 weeks GA       Return in about 1 week (around 4/6/2023) for MARCIA, needs GBS.     Sylvia Hernandez DO  Ob/Gyn Resident  [facility], [address]  3/30/2023, 3:03 PM

## 2023-04-05 ENCOUNTER — ROUTINE PRENATAL (OUTPATIENT)
Dept: PERINATAL CARE | Age: 33
End: 2023-04-05
Payer: MEDICAID

## 2023-04-05 VITALS
WEIGHT: 214 LBS | BODY MASS INDEX: 40.4 KG/M2 | DIASTOLIC BLOOD PRESSURE: 81 MMHG | HEIGHT: 61 IN | SYSTOLIC BLOOD PRESSURE: 132 MMHG | HEART RATE: 98 BPM

## 2023-04-05 DIAGNOSIS — Q51.28 SEPTATE UTERUS AFFECTING PREGNANCY IN THIRD TRIMESTER: ICD-10-CM

## 2023-04-05 DIAGNOSIS — O09.523 MULTIGRAVIDA OF ADVANCED MATERNAL AGE IN THIRD TRIMESTER: ICD-10-CM

## 2023-04-05 DIAGNOSIS — O16.3 HYPERTENSION AFFECTING PREGNANCY IN THIRD TRIMESTER: ICD-10-CM

## 2023-04-05 DIAGNOSIS — Z3A.35 35 WEEKS GESTATION OF PREGNANCY: ICD-10-CM

## 2023-04-05 DIAGNOSIS — O35.9XX0 FETAL ABNORMALITY AFFECTING MANAGEMENT OF MOTHER, SINGLE OR UNSPECIFIED FETUS: Primary | ICD-10-CM

## 2023-04-05 DIAGNOSIS — O99.213 OBESITY AFFECTING PREGNANCY IN THIRD TRIMESTER: ICD-10-CM

## 2023-04-05 DIAGNOSIS — O34.03 SEPTATE UTERUS AFFECTING PREGNANCY IN THIRD TRIMESTER: ICD-10-CM

## 2023-04-05 PROCEDURE — G8427 DOCREV CUR MEDS BY ELIG CLIN: HCPCS | Performed by: OBSTETRICS & GYNECOLOGY

## 2023-04-05 PROCEDURE — G8417 CALC BMI ABV UP PARAM F/U: HCPCS | Performed by: OBSTETRICS & GYNECOLOGY

## 2023-04-05 PROCEDURE — 76820 UMBILICAL ARTERY ECHO: CPT | Performed by: OBSTETRICS & GYNECOLOGY

## 2023-04-05 PROCEDURE — 99243 OFF/OP CNSLTJ NEW/EST LOW 30: CPT | Performed by: OBSTETRICS & GYNECOLOGY

## 2023-04-05 PROCEDURE — 76819 FETAL BIOPHYS PROFIL W/O NST: CPT | Performed by: OBSTETRICS & GYNECOLOGY

## 2023-04-05 PROCEDURE — 76815 OB US LIMITED FETUS(S): CPT | Performed by: OBSTETRICS & GYNECOLOGY

## 2023-04-07 ENCOUNTER — ROUTINE PRENATAL (OUTPATIENT)
Dept: OBGYN | Age: 33
End: 2023-04-07
Payer: MEDICAID

## 2023-04-07 ENCOUNTER — HOSPITAL ENCOUNTER (OUTPATIENT)
Age: 33
Setting detail: SPECIMEN
Discharge: HOME | End: 2023-04-07

## 2023-04-07 VITALS
HEART RATE: 88 BPM | SYSTOLIC BLOOD PRESSURE: 116 MMHG | BODY MASS INDEX: 40.81 KG/M2 | DIASTOLIC BLOOD PRESSURE: 81 MMHG | WEIGHT: 216 LBS

## 2023-04-07 DIAGNOSIS — Z3A.35 35 WEEKS GESTATION OF PREGNANCY: ICD-10-CM

## 2023-04-07 DIAGNOSIS — Z34.93 PRENATAL CARE IN THIRD TRIMESTER: Primary | ICD-10-CM

## 2023-04-07 LAB
TOXOPLASM IGM: 0.53 INDEX
TOXOPLASMA BLOOD FOR RATIO: 0.6 IU/ML

## 2023-04-07 PROCEDURE — 99211 OFF/OP EST MAY X REQ PHY/QHP: CPT

## 2023-04-07 NOTE — PROGRESS NOTES
Prenatal Visit    Katheryne Prader is a 27 y.o. female  at 35w2d    The patient was seen and evaluated. She presents for a routine prenatal appointment with no complaints. She reports positive fetal movements. She denies contractions, vaginal bleeding and leakage of fluid. Signs and symptoms of labor and pre-eclampsia were reviewed with the patient in detail. She is to report any of these if they occur. She currently denies any signs or symptoms of pre-clampsia including headache, vision changes, RUQ pain. The patient is Rh pos and Rhogam is not indicated in this pregnancy. The patient declined the T-Dap Vaccine (27-36 weeks) this pregnancy. The patient declined the influenza vaccine this year. The patient declined the COVID-19 vaccine this year. The problem list reflects the active issues addressed during today's visit. Allergies:  No Known Allergies    Vitals:    BP: 116/81  Weight: 216 lb (98 kg)  Heart Rate: 88  Fundal Height (cm): 35 cm  Fetal HR: 140  Movement: Present     Labs:  Group Beta Strep collection was not done. Assessment & Plan:  Katheryne Prader is a 27 y.o. female  at 35w2d   - GBS testing was not yet completed   - Tdap vaccination: declined recommendations for TDAP immunization, patient declined. - Rhogam: not indicated   - Influenza vaccination: Declined   - COVID-19 vaccination: R/B/A discussed with increased risk of both maternal and fetal morbidity and mortality in unvaccinated pregnant patients who contract COVID-19- patient declined today   -  testing indication: cHTN (no meds)- scheduled for 23    - Indications for  section:  Hx of classical  , ERAS protocols: reviewed with patient on 23   - Warning signs reviewed and recommendations when to call or present to the hospital if she experiences signs or symptoms of  labor and pre-eclampsia were reviewed. - The patient was instructed on fetal kick counts.  She was

## 2023-04-07 NOTE — PROGRESS NOTES
Attending Physician Statement  I have discussed the care of Leanord Sacks, including pertinent history and exam findings, with the resident. I have reviewed the key elements of all parts of the encounter with the resident. I agree with the assessment, plan and orders as documented by the resident.   (GE Modifier)    Mai Winn, [facility], [address]  4/7/2023, 1:30 PM

## 2023-04-09 LAB
PARVOVIRUS B19 IGG ANTIBODY: 6.89 IV
PARVOVIRUS B19 IGM ANTIBODY: 0.31 IV

## 2023-04-12 PROBLEM — B25.9 CYTOMEGALOVIRUS (CMV) VIREMIA (HCC): Status: ACTIVE | Noted: 2023-04-12

## 2023-04-17 ENCOUNTER — ANESTHESIA EVENT (OUTPATIENT)
Dept: LABOR AND DELIVERY | Age: 33
End: 2023-04-17
Payer: MEDICAID

## 2023-04-17 NOTE — DISCHARGE SUMMARY
[address], [facility] [address]      Phone: [phone]   acetaminophen 500 MG tablet  ferrous sulfate 325 (65 Fe) MG tablet  ibuprofen 600 MG tablet  oxyCODONE 5 MG immediate release tablet  sennosides-docusate sodium 8.6-50 MG tablet  simethicone 80 MG chewable tablet           Activity: pelvic rest x 6 weeks, no driving on narcotics, no lifting greater than 15 lbs  Diet: regular diet  Follow up: 1 week for Prevena dressing removal    Condition on discharge: stable    Discharge date: 4/20/23    Afia Lucas,   Ob/Gyn Resident    Comments:  Home care and follow-up care were reviewed. Pelvic rest, and birth control were reviewed. Signs and symptoms of mastitis and post partum depression were reviewed. The patient is to notify her physician if any of these occur. The patient was counseled on secondary smoke risks and the increased risk of sudden infant death syndrome and respiratory problems to her baby with exposure. She was counseled on various alternate recommendations to decrease the exposure to secondary smoke to her children. Attending Physician Statement  I have personally seen, evaluated and discussed the care of Valeri Aguilera, including pertinent history and exam findings with the resident. I have reviewed and edited their note in the electronic medical record. The key elements of all parts of the encounter have been performed/reviewed by me. I agree with the assessment, plan and orders as documented by the resident. The level of care submitted represents to the best of my ability the care documented in the medical record today. GC Modifier. This service has been performed in part by a resident under the direction of a teaching physician.       Attending's Name:  Mat Eaton MD  Date: 4/20/2023  Time: 8:31 AM

## 2023-04-17 NOTE — H&P
29w0d secondary to placenta previa with bleeding and clinical instability     cHTN (no meds)   - BP normotensive on admission   - Denies s/s PreE    - CBC, CMP, P/C ordered upon admission   - ASA 81 mg QD    Septate uterus   - Noted on MFM US 22   - Not visualized on most recent MFM US 23    AMA/APA   - Low risk FTS and NIPT   - Normal NT   - Negative [facility] screen    Dilated fetal bowel   - Noted on MFM US 3/22/23   - Low risk FTS and NIPT, negative [facility]ara screen   - Viral studies remarkable for CMV IgM and IgG and Parvo IgG    HSV   - Patient denies prodromal s/s or current lesions   - Reports compliance with Valtrex suppression therapy    - Reports last outbreak was 10/2022    - No SSE completed on admission secondary to scheduled C/S    THC use   - UDS positive 22   - UDS ordered upon admission   - SW consult PP    Tobacco use   - Encourage cessation    Homozygous MTHFR   - Thrombophilia workup completed 22   - Foltx QD    Fhx VTE   - Patient's father   - Thrombophilia workup completed 22 and notable only for homozygous MTHFR   - No personal history of VTE     Fhx DM   - Patient's father   - Early 1 hr GTT completed and elevated   - Early 3 hr GTT completed, only 1/4 elevated values   - No 3 hr GTT completed    BMI 41    Patient Active Problem List    Diagnosis Date Noted    Dyslipidemia 10/26/2022     Priority: Medium    Advacned maternal age 10/26/2022     Priority: Medium    Cigarette smoker 10/26/2022     Priority: Medium     10/26/22: Patient reports having smoked a pack a day before pregnancy. Patient reports now smoking just \"a few\" cigarettes a day. Patient counseled on maternal/fetal risk factors.  Patient verbalizes understanding       Patient desires risk reducing bilateral salpingectomy 10/26/2022     Priority: Medium    H/O  delivery, currently pregnant 10/25/2022     Priority: Medium     2008: Patient states she had had intercourse with FOB and she started

## 2023-04-18 ENCOUNTER — HOSPITAL ENCOUNTER (INPATIENT)
Age: 33
LOS: 2 days | Discharge: HOME | End: 2023-04-20
Attending: OBSTETRICS & GYNECOLOGY | Admitting: OBSTETRICS & GYNECOLOGY
Payer: MEDICAID

## 2023-04-18 ENCOUNTER — ANESTHESIA (OUTPATIENT)
Dept: LABOR AND DELIVERY | Age: 33
End: 2023-04-18
Payer: MEDICAID

## 2023-04-18 DIAGNOSIS — Z98.891 H/O CESAREAN SECTION: Primary | ICD-10-CM

## 2023-04-18 PROBLEM — Z3A.36 36 WEEKS GESTATION OF PREGNANCY: Status: ACTIVE | Noted: 2023-04-18

## 2023-04-18 LAB
ABO/RH: NORMAL
ALBUMIN SERPL-MCNC: 3.2 G/DL (ref 3.5–5.2)
ALBUMIN/GLOBULIN RATIO: 1.3 (ref 1–2.5)
ALP SERPL-CCNC: 127 U/L (ref 35–104)
ALT SERPL-CCNC: 9 U/L (ref 5–33)
AMPHETAMINE SCREEN URINE: NEGATIVE
ANION GAP SERPL CALCULATED.3IONS-SCNC: 8 MMOL/L (ref 9–17)
ANTIBODY SCREEN: NEGATIVE
ARM BAND NUMBER: NORMAL
AST SERPL-CCNC: 11 U/L
BARBITURATE SCREEN URINE: NEGATIVE
BENZODIAZEPINE SCREEN, URINE: NEGATIVE
BILIRUB SERPL-MCNC: <0.1 MG/DL (ref 0.3–1.2)
BUN SERPL-MCNC: 7 MG/DL (ref 6–20)
CALCIUM SERPL-MCNC: 8.8 MG/DL (ref 8.6–10.4)
CANNABINOID SCREEN URINE: NEGATIVE
CHLORIDE SERPL-SCNC: 108 MMOL/L (ref 98–107)
CO2 SERPL-SCNC: 19 MMOL/L (ref 20–31)
COCAINE METABOLITE, URINE: NEGATIVE
CREAT SERPL-MCNC: 0.43 MG/DL (ref 0.5–0.9)
CREATININE URINE: 184.8 MG/DL (ref 28–217)
EXPIRATION DATE: NORMAL
FENTANYL URINE: NEGATIVE
GFR SERPL CREATININE-BSD FRML MDRD: >60 ML/MIN/1.73M2
GLUCOSE SERPL-MCNC: 108 MG/DL (ref 70–99)
HCT VFR BLD AUTO: 32.8 % (ref 36.3–47.1)
HGB BLD-MCNC: 10.8 G/DL (ref 11.9–15.1)
MCH RBC QN AUTO: 27.7 PG (ref 25.2–33.5)
MCHC RBC AUTO-ENTMCNC: 32.9 G/DL (ref 28.4–34.8)
MCV RBC AUTO: 84.1 FL (ref 82.6–102.9)
METHADONE SCREEN, URINE: NEGATIVE
NRBC AUTOMATED: 0 PER 100 WBC
OPIATES, URINE: NEGATIVE
OXYCODONE SCREEN URINE: NEGATIVE
PDW BLD-RTO: 15 % (ref 11.8–14.4)
PHENCYCLIDINE, URINE: NEGATIVE
PLATELET # BLD AUTO: 222 K/UL (ref 138–453)
PMV BLD AUTO: 11.3 FL (ref 8.1–13.5)
POTASSIUM SERPL-SCNC: 3.9 MMOL/L (ref 3.7–5.3)
PROT SERPL-MCNC: 5.7 G/DL (ref 6.4–8.3)
RBC # BLD: 3.9 M/UL (ref 3.95–5.11)
SODIUM SERPL-SCNC: 135 MMOL/L (ref 135–144)
T PALLIDUM AB SER QL IA: NONREACTIVE
TEST INFORMATION: NORMAL
TOTAL PROTEIN, URINE: 25 MG/DL
URINE TOTAL PROTEIN CREATININE RATIO: 0.14 (ref 0–0.2)
WBC # BLD AUTO: 12.4 K/UL (ref 3.5–11.3)

## 2023-04-18 PROCEDURE — 6360000002 HC RX W HCPCS: Performed by: STUDENT IN AN ORGANIZED HEALTH CARE EDUCATION/TRAINING PROGRAM

## 2023-04-18 PROCEDURE — 86850 RBC ANTIBODY SCREEN: CPT

## 2023-04-18 PROCEDURE — 1220000000 HC SEMI PRIVATE OB R&B

## 2023-04-18 PROCEDURE — 86900 BLOOD TYPING SEROLOGIC ABO: CPT

## 2023-04-18 PROCEDURE — 6360000002 HC RX W HCPCS

## 2023-04-18 PROCEDURE — 3609079900 HC CESAREAN SECTION: Performed by: OBSTETRICS & GYNECOLOGY

## 2023-04-18 PROCEDURE — 85027 COMPLETE CBC AUTOMATED: CPT

## 2023-04-18 PROCEDURE — 59515 CESAREAN DELIVERY: CPT | Performed by: OBSTETRICS & GYNECOLOGY

## 2023-04-18 PROCEDURE — 7100000001 HC PACU RECOVERY - ADDTL 15 MIN: Performed by: OBSTETRICS & GYNECOLOGY

## 2023-04-18 PROCEDURE — 7100000000 HC PACU RECOVERY - FIRST 15 MIN: Performed by: OBSTETRICS & GYNECOLOGY

## 2023-04-18 PROCEDURE — 84156 ASSAY OF PROTEIN URINE: CPT

## 2023-04-18 PROCEDURE — 86780 TREPONEMA PALLIDUM: CPT

## 2023-04-18 PROCEDURE — 2580000003 HC RX 258

## 2023-04-18 PROCEDURE — 80307 DRUG TEST PRSMV CHEM ANLYZR: CPT

## 2023-04-18 PROCEDURE — 2500000003 HC RX 250 WO HCPCS: Performed by: STUDENT IN AN ORGANIZED HEALTH CARE EDUCATION/TRAINING PROGRAM

## 2023-04-18 PROCEDURE — 88305 TISSUE EXAM BY PATHOLOGIST: CPT

## 2023-04-18 PROCEDURE — 82570 ASSAY OF URINE CREATININE: CPT

## 2023-04-18 PROCEDURE — 2709999900 HC NON-CHARGEABLE SUPPLY: Performed by: OBSTETRICS & GYNECOLOGY

## 2023-04-18 PROCEDURE — 6370000000 HC RX 637 (ALT 250 FOR IP)

## 2023-04-18 PROCEDURE — 6370000000 HC RX 637 (ALT 250 FOR IP): Performed by: STUDENT IN AN ORGANIZED HEALTH CARE EDUCATION/TRAINING PROGRAM

## 2023-04-18 PROCEDURE — 58700 REMOVAL OF FALLOPIAN TUBE: CPT | Performed by: OBSTETRICS & GYNECOLOGY

## 2023-04-18 PROCEDURE — 2580000003 HC RX 258: Performed by: STUDENT IN AN ORGANIZED HEALTH CARE EDUCATION/TRAINING PROGRAM

## 2023-04-18 PROCEDURE — 80053 COMPREHEN METABOLIC PANEL: CPT

## 2023-04-18 PROCEDURE — 0UT70ZZ RESECTION OF BILATERAL FALLOPIAN TUBES, OPEN APPROACH: ICD-10-PCS | Performed by: OBSTETRICS & GYNECOLOGY

## 2023-04-18 PROCEDURE — 3700000000 HC ANESTHESIA ATTENDED CARE: Performed by: OBSTETRICS & GYNECOLOGY

## 2023-04-18 PROCEDURE — 3700000001 HC ADD 15 MINUTES (ANESTHESIA): Performed by: OBSTETRICS & GYNECOLOGY

## 2023-04-18 PROCEDURE — 86901 BLOOD TYPING SEROLOGIC RH(D): CPT

## 2023-04-18 RX ORDER — SODIUM CHLORIDE, SODIUM LACTATE, POTASSIUM CHLORIDE, AND CALCIUM CHLORIDE .6; .31; .03; .02 G/100ML; G/100ML; G/100ML; G/100ML
1000 INJECTION, SOLUTION INTRAVENOUS ONCE
Status: COMPLETED | OUTPATIENT
Start: 2023-04-18 | End: 2023-04-18

## 2023-04-18 RX ORDER — NALOXONE HYDROCHLORIDE 0.4 MG/ML
0.4 INJECTION, SOLUTION INTRAMUSCULAR; INTRAVENOUS; SUBCUTANEOUS PRN
Status: DISCONTINUED | OUTPATIENT
Start: 2023-04-18 | End: 2023-04-20 | Stop reason: HOSPADM

## 2023-04-18 RX ORDER — POLYETHYLENE GLYCOL 3350 17 G/17G
17 POWDER, FOR SOLUTION ORAL DAILY PRN
Status: DISCONTINUED | OUTPATIENT
Start: 2023-04-18 | End: 2023-04-20 | Stop reason: HOSPADM

## 2023-04-18 RX ORDER — ACETAMINOPHEN 500 MG
1000 TABLET ORAL EVERY 6 HOURS
Status: DISCONTINUED | OUTPATIENT
Start: 2023-04-18 | End: 2023-04-20 | Stop reason: HOSPADM

## 2023-04-18 RX ORDER — OXYCODONE HYDROCHLORIDE 5 MG/1
10 TABLET ORAL EVERY 4 HOURS PRN
Status: DISCONTINUED | OUTPATIENT
Start: 2023-04-18 | End: 2023-04-20 | Stop reason: HOSPADM

## 2023-04-18 RX ORDER — SODIUM CHLORIDE 9 MG/ML
INJECTION, SOLUTION INTRAVENOUS PRN
Status: DISCONTINUED | OUTPATIENT
Start: 2023-04-18 | End: 2023-04-20 | Stop reason: HOSPADM

## 2023-04-18 RX ORDER — SENNA AND DOCUSATE SODIUM 50; 8.6 MG/1; MG/1
1 TABLET, FILM COATED ORAL DAILY
Qty: 30 TABLET | Refills: 0 | Status: SHIPPED | OUTPATIENT
Start: 2023-04-18

## 2023-04-18 RX ORDER — NALOXONE HYDROCHLORIDE 0.4 MG/ML
INJECTION, SOLUTION INTRAMUSCULAR; INTRAVENOUS; SUBCUTANEOUS PRN
Status: DISCONTINUED | OUTPATIENT
Start: 2023-04-18 | End: 2023-04-18

## 2023-04-18 RX ORDER — ONDANSETRON 2 MG/ML
4 INJECTION INTRAMUSCULAR; INTRAVENOUS EVERY 6 HOURS PRN
Status: DISCONTINUED | OUTPATIENT
Start: 2023-04-18 | End: 2023-04-20 | Stop reason: HOSPADM

## 2023-04-18 RX ORDER — SODIUM CHLORIDE 9 MG/ML
25 INJECTION, SOLUTION INTRAVENOUS PRN
Status: DISCONTINUED | OUTPATIENT
Start: 2023-04-18 | End: 2023-04-18

## 2023-04-18 RX ORDER — SODIUM CHLORIDE, SODIUM LACTATE, POTASSIUM CHLORIDE, CALCIUM CHLORIDE 600; 310; 30; 20 MG/100ML; MG/100ML; MG/100ML; MG/100ML
INJECTION, SOLUTION INTRAVENOUS CONTINUOUS
Status: DISCONTINUED | OUTPATIENT
Start: 2023-04-18 | End: 2023-04-18

## 2023-04-18 RX ORDER — OXYCODONE HYDROCHLORIDE 5 MG/1
5 TABLET ORAL EVERY 6 HOURS PRN
Qty: 20 TABLET | Refills: 0 | Status: SHIPPED | OUTPATIENT
Start: 2023-04-18 | End: 2023-04-23

## 2023-04-18 RX ORDER — METRONIDAZOLE 500 MG/1
500 TABLET ORAL EVERY 8 HOURS SCHEDULED
Status: DISCONTINUED | OUTPATIENT
Start: 2023-04-19 | End: 2023-04-20 | Stop reason: HOSPADM

## 2023-04-18 RX ORDER — ENOXAPARIN SODIUM 100 MG/ML
40 INJECTION SUBCUTANEOUS DAILY
Status: DISCONTINUED | OUTPATIENT
Start: 2023-04-19 | End: 2023-04-20 | Stop reason: HOSPADM

## 2023-04-18 RX ORDER — ACETAMINOPHEN 500 MG
1000 TABLET ORAL EVERY 6 HOURS PRN
Qty: 60 TABLET | Refills: 1 | Status: SHIPPED | OUTPATIENT
Start: 2023-04-18

## 2023-04-18 RX ORDER — VITAMIN A, ASCORBIC ACID, CHOLECALCIFEROL, .ALPHA.-TOCOPHEROL ACETATE, DL-, THIAMINE MONONITRATE, RIBOFLAVIN, NIACINAMIDE, PYRIDOXINE HYDROCHLORIDE, FOLIC ACID, CYANOCOBALAMIN, CALCIUM CARBONATE, IRON, ZINC OXIDE, AND CUPRIC OXIDE 4000; 120; 400; 22; 1.84; 3; 20; 10; 1; 12; 200; 29; 25; 2 [IU]/1; MG/1; [IU]/1; [IU]/1; MG/1; MG/1; MG/1; MG/1; MG/1; UG/1; MG/1; MG/1; MG/1; MG/1
1 TABLET ORAL DAILY
Status: DISCONTINUED | OUTPATIENT
Start: 2023-04-18 | End: 2023-04-20 | Stop reason: HOSPADM

## 2023-04-18 RX ORDER — IBUPROFEN 600 MG/1
600 TABLET ORAL EVERY 6 HOURS
Status: DISCONTINUED | OUTPATIENT
Start: 2023-04-19 | End: 2023-04-20 | Stop reason: HOSPADM

## 2023-04-18 RX ORDER — LANOLIN 72 %
OINTMENT (GRAM) TOPICAL
Status: DISCONTINUED | OUTPATIENT
Start: 2023-04-18 | End: 2023-04-20 | Stop reason: HOSPADM

## 2023-04-18 RX ORDER — BISACODYL 10 MG
10 SUPPOSITORY, RECTAL RECTAL DAILY PRN
Status: DISCONTINUED | OUTPATIENT
Start: 2023-04-18 | End: 2023-04-20 | Stop reason: HOSPADM

## 2023-04-18 RX ORDER — BUPIVACAINE HYDROCHLORIDE 7.5 MG/ML
INJECTION, SOLUTION INTRASPINAL
Status: COMPLETED | OUTPATIENT
Start: 2023-04-18 | End: 2023-04-18

## 2023-04-18 RX ORDER — TRISODIUM CITRATE DIHYDRATE AND CITRIC ACID MONOHYDRATE 500; 334 MG/5ML; MG/5ML
30 SOLUTION ORAL ONCE
Status: COMPLETED | OUTPATIENT
Start: 2023-04-18 | End: 2023-04-18

## 2023-04-18 RX ORDER — ACETAMINOPHEN 325 MG/1
975 TABLET ORAL ONCE
Status: COMPLETED | OUTPATIENT
Start: 2023-04-18 | End: 2023-04-18

## 2023-04-18 RX ORDER — ONDANSETRON 2 MG/ML
INJECTION INTRAMUSCULAR; INTRAVENOUS PRN
Status: DISCONTINUED | OUTPATIENT
Start: 2023-04-18 | End: 2023-04-18 | Stop reason: SDUPTHER

## 2023-04-18 RX ORDER — SODIUM CHLORIDE 0.9 % (FLUSH) 0.9 %
10 SYRINGE (ML) INJECTION PRN
Status: DISCONTINUED | OUTPATIENT
Start: 2023-04-18 | End: 2023-04-18

## 2023-04-18 RX ORDER — SIMETHICONE 80 MG
80 TABLET,CHEWABLE ORAL 4 TIMES DAILY PRN
Qty: 90 TABLET | Refills: 0 | Status: SHIPPED | OUTPATIENT
Start: 2023-04-18

## 2023-04-18 RX ORDER — SIMETHICONE 80 MG
80 TABLET,CHEWABLE ORAL EVERY 6 HOURS PRN
Status: DISCONTINUED | OUTPATIENT
Start: 2023-04-18 | End: 2023-04-20 | Stop reason: HOSPADM

## 2023-04-18 RX ORDER — SODIUM CHLORIDE 0.9 % (FLUSH) 0.9 %
5-40 SYRINGE (ML) INJECTION PRN
Status: DISCONTINUED | OUTPATIENT
Start: 2023-04-18 | End: 2023-04-20 | Stop reason: HOSPADM

## 2023-04-18 RX ORDER — DIPHENHYDRAMINE HYDROCHLORIDE 50 MG/ML
25 INJECTION INTRAMUSCULAR; INTRAVENOUS EVERY 6 HOURS PRN
Status: DISCONTINUED | OUTPATIENT
Start: 2023-04-18 | End: 2023-04-20 | Stop reason: HOSPADM

## 2023-04-18 RX ORDER — IBUPROFEN 600 MG/1
600 TABLET ORAL EVERY 6 HOURS PRN
Qty: 30 TABLET | Refills: 1 | Status: SHIPPED | OUTPATIENT
Start: 2023-04-18

## 2023-04-18 RX ORDER — ONDANSETRON 2 MG/ML
4 INJECTION INTRAMUSCULAR; INTRAVENOUS EVERY 6 HOURS PRN
Status: DISCONTINUED | OUTPATIENT
Start: 2023-04-18 | End: 2023-04-18

## 2023-04-18 RX ORDER — SODIUM CHLORIDE 0.9 % (FLUSH) 0.9 %
5-40 SYRINGE (ML) INJECTION EVERY 12 HOURS SCHEDULED
Status: DISCONTINUED | OUTPATIENT
Start: 2023-04-18 | End: 2023-04-20 | Stop reason: HOSPADM

## 2023-04-18 RX ORDER — PHENYLEPHRINE HYDROCHLORIDE 10 MG/ML
INJECTION INTRAVENOUS PRN
Status: DISCONTINUED | OUTPATIENT
Start: 2023-04-18 | End: 2023-04-18 | Stop reason: SDUPTHER

## 2023-04-18 RX ORDER — DOCUSATE SODIUM 100 MG/1
100 CAPSULE, LIQUID FILLED ORAL 2 TIMES DAILY
Status: DISCONTINUED | OUTPATIENT
Start: 2023-04-18 | End: 2023-04-20 | Stop reason: HOSPADM

## 2023-04-18 RX ORDER — MORPHINE SULFATE 1 MG/ML
INJECTION, SOLUTION EPIDURAL; INTRATHECAL; INTRAVENOUS
Status: COMPLETED | OUTPATIENT
Start: 2023-04-18 | End: 2023-04-18

## 2023-04-18 RX ORDER — KETOROLAC TROMETHAMINE 30 MG/ML
30 INJECTION, SOLUTION INTRAMUSCULAR; INTRAVENOUS EVERY 6 HOURS
Status: COMPLETED | OUTPATIENT
Start: 2023-04-18 | End: 2023-04-19

## 2023-04-18 RX ORDER — CEPHALEXIN 500 MG/1
500 CAPSULE ORAL EVERY 8 HOURS SCHEDULED
Status: DISCONTINUED | OUTPATIENT
Start: 2023-04-19 | End: 2023-04-20 | Stop reason: HOSPADM

## 2023-04-18 RX ORDER — SODIUM CHLORIDE 0.9 % (FLUSH) 0.9 %
10 SYRINGE (ML) INJECTION EVERY 12 HOURS SCHEDULED
Status: DISCONTINUED | OUTPATIENT
Start: 2023-04-18 | End: 2023-04-18

## 2023-04-18 RX ORDER — OXYCODONE HYDROCHLORIDE 5 MG/1
5 TABLET ORAL EVERY 4 HOURS PRN
Status: DISCONTINUED | OUTPATIENT
Start: 2023-04-18 | End: 2023-04-20 | Stop reason: HOSPADM

## 2023-04-18 RX ADMIN — SODIUM CHLORIDE, POTASSIUM CHLORIDE, SODIUM LACTATE AND CALCIUM CHLORIDE 1000 ML: 600; 310; 30; 20 INJECTION, SOLUTION INTRAVENOUS at 06:30

## 2023-04-18 RX ADMIN — PHENYLEPHRINE HYDROCHLORIDE 100 MCG: 10 INJECTION INTRAVENOUS at 08:18

## 2023-04-18 RX ADMIN — PHENYLEPHRINE HYDROCHLORIDE 25 MCG/MIN: 10 INJECTION INTRAVENOUS at 08:13

## 2023-04-18 RX ADMIN — ACETAMINOPHEN 1000 MG: 500 TABLET ORAL at 13:41

## 2023-04-18 RX ADMIN — Medication 909 ML/HR: at 08:44

## 2023-04-18 RX ADMIN — MORPHINE SULFATE 4 MG: 1 INJECTION, SOLUTION EPIDURAL; INTRATHECAL; INTRAVENOUS at 08:59

## 2023-04-18 RX ADMIN — KETOROLAC TROMETHAMINE 30 MG: 30 INJECTION, SOLUTION INTRAMUSCULAR at 16:38

## 2023-04-18 RX ADMIN — DOCUSATE SODIUM 100 MG: 100 CAPSULE ORAL at 13:41

## 2023-04-18 RX ADMIN — SODIUM CHLORIDE, POTASSIUM CHLORIDE, SODIUM LACTATE AND CALCIUM CHLORIDE: 600; 310; 30; 20 INJECTION, SOLUTION INTRAVENOUS at 08:38

## 2023-04-18 RX ADMIN — BUPIVACAINE HYDROCHLORIDE IN DEXTROSE 12 MG: 7.5 INJECTION, SOLUTION SUBARACHNOID at 08:12

## 2023-04-18 RX ADMIN — SODIUM CHLORIDE, POTASSIUM CHLORIDE, SODIUM LACTATE AND CALCIUM CHLORIDE: 600; 310; 30; 20 INJECTION, SOLUTION INTRAVENOUS at 07:27

## 2023-04-18 RX ADMIN — Medication 1 EACH: at 10:08

## 2023-04-18 RX ADMIN — ACETAMINOPHEN 975 MG: 325 TABLET ORAL at 07:19

## 2023-04-18 RX ADMIN — SODIUM CHLORIDE, POTASSIUM CHLORIDE, SODIUM LACTATE AND CALCIUM CHLORIDE: 600; 310; 30; 20 INJECTION, SOLUTION INTRAVENOUS at 18:31

## 2023-04-18 RX ADMIN — SODIUM CHLORIDE, PRESERVATIVE FREE 20 MG: 5 INJECTION INTRAVENOUS at 07:19

## 2023-04-18 RX ADMIN — PHENYLEPHRINE HYDROCHLORIDE 100 MCG: 10 INJECTION INTRAVENOUS at 08:15

## 2023-04-18 RX ADMIN — ACETAMINOPHEN 1000 MG: 500 TABLET ORAL at 18:11

## 2023-04-18 RX ADMIN — MORPHINE SULFATE 0.2 MG: 1 INJECTION, SOLUTION EPIDURAL; INTRATHECAL; INTRAVENOUS at 08:12

## 2023-04-18 RX ADMIN — DOCUSATE SODIUM 100 MG: 100 CAPSULE ORAL at 20:45

## 2023-04-18 RX ADMIN — PHENYLEPHRINE HYDROCHLORIDE 50 MCG/MIN: 10 INJECTION INTRAVENOUS at 08:15

## 2023-04-18 RX ADMIN — Medication 1 TABLET: at 13:41

## 2023-04-18 RX ADMIN — Medication 2000 MG: at 07:55

## 2023-04-18 RX ADMIN — KETOROLAC TROMETHAMINE 30 MG: 30 INJECTION, SOLUTION INTRAMUSCULAR at 11:09

## 2023-04-18 RX ADMIN — SODIUM CITRATE AND CITRIC ACID MONOHYDRATE 30 ML: 500; 334 SOLUTION ORAL at 07:19

## 2023-04-18 RX ADMIN — ONDANSETRON 4 MG: 2 INJECTION INTRAMUSCULAR; INTRAVENOUS at 08:09

## 2023-04-18 RX ADMIN — SODIUM CHLORIDE, POTASSIUM CHLORIDE, SODIUM LACTATE AND CALCIUM CHLORIDE: 600; 310; 30; 20 INJECTION, SOLUTION INTRAVENOUS at 09:13

## 2023-04-18 ASSESSMENT — PAIN - FUNCTIONAL ASSESSMENT: PAIN_FUNCTIONAL_ASSESSMENT: ACTIVITIES ARE NOT PREVENTED

## 2023-04-18 ASSESSMENT — PAIN SCALES - GENERAL
PAINLEVEL_OUTOF10: 2
PAINLEVEL_OUTOF10: 1
PAINLEVEL_OUTOF10: 2
PAINLEVEL_OUTOF10: 3
PAINLEVEL_OUTOF10: 0

## 2023-04-18 ASSESSMENT — PAIN DESCRIPTION - LOCATION
LOCATION: INCISION
LOCATION: INCISION

## 2023-04-18 ASSESSMENT — PAIN DESCRIPTION - DESCRIPTORS
DESCRIPTORS: DISCOMFORT
DESCRIPTORS: DISCOMFORT

## 2023-04-18 ASSESSMENT — PAIN DESCRIPTION - ORIENTATION: ORIENTATION: LOWER;MID

## 2023-04-18 NOTE — LACTATION NOTE
INPATIENT CONSULT    Maternal /para status:     Maternal breastfeeding history:     First time breastfeeding?: yes    Previous experience breastfeeding:   First child fourteen years ago, \"never latched\" pumped and bottle fed. Current pregnancy:    Gestational age: 36.6 weeks     C/section or vaginal delivery?: c/s      Birth weight: 46gms  6# 15.1      SGA/LGA/IUGR/diabetes during pregnancy?: no      Plan for feeding:    Breastfeeding: yes     Expressed breastmilk:     Breast pump at home?:  yes      Need WIC form?:     Bottle feeding:      Type of formula:      Assessment of breastfeeding:  Assisted with initial feed in recovery. Bilaterally nipples flatten when compressed. Baby able to latch but having some difficulty maintaining. Vigorous suck noted.          Reviewed:   - Breastfeeding packet  - Expectations for normal  feeding   - Hand expression  - Deep latch/milk transfer  - Cues for feeding (early/late)     Encouraged:   - Frequent skin to skin with mom or dad  - Frequent attempts to feed  - Calling for assistance as needed

## 2023-04-18 NOTE — OP NOTE
Operative Note  Department of Obstetrics and Gynecology  [facility]     Patient: Tiara Saldaña   : 1990  MRN: [id number]       Acct: [de-identified]   PCP: Brittni Fay MD  Date of Procedure: 23    Pre-operative Diagnosis: 26 y.o. female  at 36w6d   Scheduled repeat  with risk reducing salpingectomy  History of classical   History of indicated  delivery for bleeding placenta previa  Chronic hypertension on no medications  Septate uterus  HSV2  Family history of DVT  AMA/APA  Dilated fetal bowel   BMI 41    Post-operative Diagnosis: Same as above and female living infant. Omental adhesions to uterine surface. Procedure: repeat low transverse  section with bilateral risk reducing salpingectomy, lysis of adhesions    Indications: Tiara Saldaña is a 26year old  at 36w7d who presents for a scheduled repeat  section with risk reducing bilateral salpingectomy. The patient is being deliviered at 36w6d due to her history of a classical  in her prior pregnancy due to bleeding placenta previa. She is not a candidate for TOLAC. Discussed at length the risks and benefits of concurrent bilateral salpingectomy with patient's upcoming scheduled abdominal or pelvic surgery per recommendation of the Society of Gynecologic Oncology, American College of Obstetricians and Gynecologists as this patient is at above average risk for development of ovarian cancer. Advised patient that the primary benefit of such surgery is a 65% reduction in future risk of ovarian cancer. Patient advised that large scale studies have not demonstrated an increase in estimated blood loss, complications, or operating time but that bleeding, infection, and injury to nearby organs are potential complications with this additional surgery. Finally, patient has been thoroughly counseled regarding the consequence of loss of fertility following this procedure.

## 2023-04-18 NOTE — ANESTHESIA PROCEDURE NOTES
Spinal Block    Patient location during procedure: OR  End time: 4/18/2023 8:12 AM  Reason for block: primary anesthetic  Staffing  Performed: resident/CRNA   Anesthesiologist: Misa Morfin MD  Resident/CRNA: TOBY Campa CRNA  Spinal Block  Patient position: sitting  Prep: Betadine  Patient monitoring: cardiac monitor, continuous pulse ox, frequent blood pressure checks and oxygen  Approach: midline  Location: L4/L5  Provider prep: sterile gloves  Local infiltration: lidocaine  Needle  Needle type: Sprotte Tip   Needle gauge: 22 G  Needle length: 4.75 in  Preanesthetic Checklist  Completed: patient identified, IV checked, site marked, risks and benefits discussed, surgical/procedural consents, equipment checked, pre-op evaluation, timeout performed, anesthesia consent given, oxygen available, monitors applied/VS acknowledged, fire risk safety assessment completed and verbalized and blood product R/B/A discussed and consented

## 2023-04-18 NOTE — ANESTHESIA PRE PROCEDURE
02:24 PM       HCG (If Applicable):   Lab Results   Component Value Date    PREGTESTUR POSITIVE 09/19/2022    HCG NEGATIVE 02/03/2017    HCGQUANT <1 05/15/2020        ABGs: No results found for: PHART, PO2ART, IQD0BAJ, JQU0YNY, BEART, W1VCXFUK     Type & Screen (If Applicable):  No results found for: LABABO, LABRH    Drug/Infectious Status (If Applicable):  Lab Results   Component Value Date/Time    HEPCAB NONREACTIVE 11/01/2022 08:55 AM       COVID-19 Screening (If Applicable): No results found for: COVID19        Anesthesia Evaluation  Patient summary reviewed no history of anesthetic complications:   Airway: Mallampati: III  TM distance: >3 FB   Neck ROM: full  Mouth opening: > = 3 FB   Dental: normal exam         Pulmonary:Negative Pulmonary ROS and normal exam                               Cardiovascular:    (+) hypertension:,                   Neuro/Psych:   Negative Neuro/Psych ROS              GI/Hepatic/Renal: Neg GI/Hepatic/Renal ROS            Endo/Other:                      ROS comment: pregnant Abdominal:             Vascular: negative vascular ROS. Other Findings:           Anesthesia Plan      spinal     ASA 3             Anesthetic plan and risks discussed with patient. Use of blood products discussed with patient whom consented to blood products. Plan discussed with CRNA.           Post-op pain plan if not by surgeon: intrathecal narcotics            Aliza Grace MD   4/18/2023

## 2023-04-18 NOTE — LACTATION NOTE
Baby sleepy in STS, disinterested in breast.  Taught mom hand expression, baby spoon fed EBM. Encouraged STS and frequent hand expression.

## 2023-04-18 NOTE — PROGRESS NOTES
POST OPERATIVE DAY # 1    Rolando Weeks is a 26 y.o. female   This patient was seen and examined today. RLTCS on 23    Her pregnancy was complicated by:   Patient Active Problem List   Diagnosis    Herpes genitalia    Pregravid BMI 35.73    H/O  delivery, currently pregnant    H/O classical  section x1    H/O diverticulitis of descending colon    Dyslipidemia    Advacned maternal age    High risk pregnancy, antepartum    Cigarette smoker    H/O peptic ulcer    Patient desires risk reducing bilateral salpingectomy    H/O marijuana use    Unvaccinated for covid-19    Bicornate uterus    cHTN (no meds)    Elevated early 1 hour GTT    Family history of breast cancer    FHx of blood clots    CMV Ab +     36 weeks gestation of pregnancy    RLTCS w/ RRS 23 F Apg 8/9 Wt 6#15       Today she is doing well without any chief complaint. Her lochia is light. She denies chest pain, shortness of breath, headache, lightheadedness, blurred vision and peripheral edema. She is breast feeding and she denies any signs or symptoms of mastitis. She is ambulating well. She is voiding without difficulty. She currently denies S/S of postpartum depression. Flatus present. Bowel movement absent. She is tolerating solids.     Vital Signs:  Vitals:    23 1600 23 2010 23 0040 23 0400   BP: (!) 100/57 (!) 102/52 122/70 111/70   Pulse: 90 82 93 96   Resp: 16 16 16 16   Temp: 98.2 °F (36.8 °C) 98.1 °F (36.7 °C) 98.8 °F (37.1 °C) 98.8 °F (37.1 °C)   TempSrc: Oral Oral Oral Oral   SpO2:  98%     Weight:       Height:             Urine Input & Output last 24hrs:     Intake/Output Summary (Last 24 hours) at 2023 0653  Last data filed at 2023 0130  Gross per 24 hour   Intake 5890.72 ml   Output 1978 ml   Net 3912.72 ml       Physical Exam:  General:  no apparent distress, alert and cooperative  Neurologic:  alert, oriented, normal speech, no focal findings or movement disorder noted  Lungs:  No

## 2023-04-18 NOTE — BRIEF OP NOTE
Department of Obstetrics and Gynecology  Obstetrical Brief Operative Report  [address]     Patient: Phuc Valencia   : 1990  MRN: [id number]       Acct: [de-identified]   Date of Procedure: 23    Pre-operative Diagnosis: 26 y.o. female  at 36w6d   Scheduled repeat  with risk reducing salpingectomy  History of classical   History of indicated  delivery for bleeding placenta previa  Chronic hypertension on no medications  Septate uterus  HSV2  Family history of DVT  AMA/APA  Dilated fetal bowel   BMI 41    Post-operative Diagnosis: Same as above and female living infant. Omental adhesions to uterine surface. Procedure: repeat low transverse  section with bilateral risk reducing salpingectomy, lysis of adhesions    Surgeon: Odin Hurt DO  Assistant(s): Edgar Garcia DO, PGY3; Jordyn Ch DO PGY2; Ivone Jain MS3    Anesthesia: spinal with Duramorph    Information for the patient's :  Anamaria Martinez [[id number]]   female   Birth Weight: 6 lb 15.1 oz (3.15 kg)   Information for the patient's :  Anamaria Martinez [[id number]]        Findings:  Live Born 6 lb 15 oz female infant in cephalic presentation with Apgars of 8 at 1 minute and 9 at five minutes, uterus with dense omental adhesions on the anterior surface, left sided fallopian tube with evidence of adhesions and scarring, normal appearing right sided fallopian tube, normal ovaries bilaterally  Estimated Blood Loss: pending  Total IV Fluids: 2000ml  Urine output: 100ml clear urine   Drains:  weber catheter  Specimens:  cord blood, cord gases, bilateral fallopian tubes  Instrument and Sponge Count: Correct  Complications: none  Condition: Infant stable, transfer to [facility], Mother stable, transfer to post anesthesia recovery    See dictated operative report for full details.     Edgar Garcia DO  Ob/Gyn Resident  2023, 9:38 AM

## 2023-04-18 NOTE — FLOWSHEET NOTE
0862- Patient arrived to L&D for a scheduled repeat C/S. Admitted to Recovery room 3.  0618- Monitor applied.

## 2023-04-18 NOTE — ANESTHESIA POSTPROCEDURE EVALUATION
Department of Anesthesiology  Postprocedure Note    Patient: Sanjiv Castro  MRN: [id number]  YOB: 1990  Date of evaluation: 2023      Procedure Summary     Date: 23 Room / Location: \[facility]""&D OR [address] [place] [address]    Anesthesia Start: [address] [facility] Anesthesia Stop: 5212    Procedure:  SECTION Diagnosis:       Delivery by elective  section      (Rpt, prior classical incision)    Surgeons: Anai Demarco DO Responsible Provider: Griselda Ashley MD    Anesthesia Type: spinal ASA Status: 3          Anesthesia Type: No value filed.     Ignacia Phase I: Ignacia Score: 9    Ignacia Phase II:        Anesthesia Post Evaluation    Patient location during evaluation: bedside  Patient participation: complete - patient participated  Level of consciousness: awake  Airway patency: patent  Nausea & Vomiting: no nausea and no vomiting  Complications: no  Cardiovascular status: hemodynamically stable  Respiratory status: acceptable  Hydration status: stable  Comments: /67   Pulse 95   Temp 97.6 °F (36.4 °C) (Oral)   Resp 20   Ht 5' 1\" (1.549 m)   Wt 218 lb (98.9 kg)   LMP 2022 (Exact Date)   SpO2 98%   BMI 41.19 kg/m²

## 2023-04-18 NOTE — CARE COORDINATION
CASE MANAGEMENT POST-PARTUM TRANSITIONAL CARE PLAN    Delivery by elective  section [O82]  36 weeks gestation of pregnancy [Z3A.36]    OB Provider: [facility]    Writer met w/ Kristal Moralesjero at her bedside to discuss DCP. She is S/P CS on 2023    Writer verified address/phone number correct on facesheet. Requested emergency contact be changed. Done in [facility]. She states she lives with her BF/FOB Fahad Gorman and her daughter. Kristal Cruz verbalized no difficulties with transportation to and from doctors appointments or with paying for medications upon discharge home. [facility] community insurance correct. Writer notified Kristal Cruz she has 30 days from date of birth to add  to insurance policy. She verbalized understanding. Kristal Cruz confirmed a safe place for infant to sleep at home. Infant name on BC: Kasia Lomax. Infant PCP undecided.   Mom has list.     DME: no  HOME CARE: no    Anticipate DC home for couplet on 2023    Readmission Risk              Risk of Unplanned Readmission:  6

## 2023-04-18 NOTE — FLOWSHEET NOTE
Received into room 734 per bed from L&D accompanied by nurse and family member. Oriented to room and call light with understanding voiced. Educational information given:  Caring for Yourself and Your [employer]; Why must my baby be screened (PKU); Screening for Infantile Krabbe disease; Pertussis; Chicken Pox; All Kids Need Hepatitis B Shots! - [employer] System; Smoking Cessation; The Facts About Secondhand Smoke; Victim of abuse information; Hepatitis B Vaccine information sheet; Baby Safe, anti shaking certificate; Babies cry a lot. It's normal.: Kids Get Flu, Too! Protect Yours! [facility]: Feeding infant formula information sheet.

## 2023-04-18 NOTE — CARE COORDINATION
ANTEPARTUM NOTE    Delivery by elective  section [O82]  36 weeks gestation of pregnancy [Z3A.36]    Kelsi Egan was admitted to L&D on 2023 for scheduled CS with RRBS @ 36     OB GYN Provider: [facility]    Follow up scheduled with [facility] on 2023 with Dr Dee White at 0358    Will meet with patient after delivery to verify name/address/phone/insurance and discuss discharge planning. Anticipate DC home 2 nights after vaginal delivery or 4 nights after C/S delivery as long as hemodynamically stable.

## 2023-04-19 LAB
ABSOLUTE EOS #: 0.11 K/UL (ref 0–0.44)
ABSOLUTE IMMATURE GRANULOCYTE: 0.05 K/UL (ref 0–0.3)
ABSOLUTE LYMPH #: 1.72 K/UL (ref 1.1–3.7)
ABSOLUTE MONO #: 0.79 K/UL (ref 0.1–1.2)
BASOPHILS # BLD: 0 % (ref 0–2)
BASOPHILS ABSOLUTE: 0.03 K/UL (ref 0–0.2)
EOSINOPHILS RELATIVE PERCENT: 1 % (ref 1–4)
HCT VFR BLD AUTO: 28.8 % (ref 36.3–47.1)
HGB BLD-MCNC: 9.2 G/DL (ref 11.9–15.1)
IMMATURE GRANULOCYTES: 0 %
LYMPHOCYTES # BLD: 13 % (ref 24–43)
MCH RBC QN AUTO: 27.6 PG (ref 25.2–33.5)
MCHC RBC AUTO-ENTMCNC: 31.9 G/DL (ref 28.4–34.8)
MCV RBC AUTO: 86.5 FL (ref 82.6–102.9)
MONOCYTES # BLD: 6 % (ref 3–12)
NRBC AUTOMATED: 0 PER 100 WBC
PDW BLD-RTO: 15.1 % (ref 11.8–14.4)
PLATELET # BLD AUTO: 187 K/UL (ref 138–453)
PMV BLD AUTO: 11.7 FL (ref 8.1–13.5)
RBC # BLD: 3.33 M/UL (ref 3.95–5.11)
RBC # BLD: ABNORMAL 10*6/UL
SEG NEUTROPHILS: 80 % (ref 36–65)
SEGMENTED NEUTROPHILS ABSOLUTE COUNT: 10.67 K/UL (ref 1.5–8.1)
WBC # BLD AUTO: 13.4 K/UL (ref 3.5–11.3)

## 2023-04-19 PROCEDURE — 2580000003 HC RX 258: Performed by: STUDENT IN AN ORGANIZED HEALTH CARE EDUCATION/TRAINING PROGRAM

## 2023-04-19 PROCEDURE — 1220000000 HC SEMI PRIVATE OB R&B

## 2023-04-19 PROCEDURE — 85025 COMPLETE CBC W/AUTO DIFF WBC: CPT

## 2023-04-19 PROCEDURE — 6360000002 HC RX W HCPCS: Performed by: STUDENT IN AN ORGANIZED HEALTH CARE EDUCATION/TRAINING PROGRAM

## 2023-04-19 PROCEDURE — 36415 COLL VENOUS BLD VENIPUNCTURE: CPT

## 2023-04-19 PROCEDURE — 6370000000 HC RX 637 (ALT 250 FOR IP): Performed by: STUDENT IN AN ORGANIZED HEALTH CARE EDUCATION/TRAINING PROGRAM

## 2023-04-19 RX ORDER — FERROUS SULFATE 325(65) MG
325 TABLET ORAL 2 TIMES DAILY
Qty: 60 TABLET | Refills: 2 | Status: SHIPPED | OUTPATIENT
Start: 2023-04-19 | End: 2023-04-26

## 2023-04-19 RX ADMIN — METRONIDAZOLE 500 MG: 500 TABLET, FILM COATED ORAL at 04:08

## 2023-04-19 RX ADMIN — ACETAMINOPHEN 1000 MG: 500 TABLET ORAL at 13:05

## 2023-04-19 RX ADMIN — Medication 1 TABLET: at 08:13

## 2023-04-19 RX ADMIN — SODIUM CHLORIDE, PRESERVATIVE FREE 10 ML: 5 INJECTION INTRAVENOUS at 08:14

## 2023-04-19 RX ADMIN — IBUPROFEN 600 MG: 600 TABLET, FILM COATED ORAL at 22:48

## 2023-04-19 RX ADMIN — IBUPROFEN 600 MG: 600 TABLET, FILM COATED ORAL at 14:55

## 2023-04-19 RX ADMIN — ENOXAPARIN SODIUM 40 MG: 40 INJECTION SUBCUTANEOUS at 08:14

## 2023-04-19 RX ADMIN — KETOROLAC TROMETHAMINE 30 MG: 30 INJECTION, SOLUTION INTRAMUSCULAR at 00:46

## 2023-04-19 RX ADMIN — CEPHALEXIN 500 MG: 500 CAPSULE ORAL at 22:48

## 2023-04-19 RX ADMIN — METRONIDAZOLE 500 MG: 500 TABLET, FILM COATED ORAL at 22:48

## 2023-04-19 RX ADMIN — CEPHALEXIN 500 MG: 500 CAPSULE ORAL at 04:08

## 2023-04-19 RX ADMIN — IBUPROFEN 600 MG: 600 TABLET, FILM COATED ORAL at 08:13

## 2023-04-19 RX ADMIN — DOCUSATE SODIUM 100 MG: 100 CAPSULE ORAL at 20:12

## 2023-04-19 RX ADMIN — METRONIDAZOLE 500 MG: 500 TABLET, FILM COATED ORAL at 14:56

## 2023-04-19 RX ADMIN — DOCUSATE SODIUM 100 MG: 100 CAPSULE ORAL at 08:13

## 2023-04-19 RX ADMIN — SODIUM CHLORIDE, PRESERVATIVE FREE 10 ML: 5 INJECTION INTRAVENOUS at 20:12

## 2023-04-19 RX ADMIN — CEPHALEXIN 500 MG: 500 CAPSULE ORAL at 14:56

## 2023-04-19 RX ADMIN — ACETAMINOPHEN 1000 MG: 500 TABLET ORAL at 20:12

## 2023-04-19 ASSESSMENT — PAIN SCALES - GENERAL
PAINLEVEL_OUTOF10: 4
PAINLEVEL_OUTOF10: 3
PAINLEVEL_OUTOF10: 1

## 2023-04-19 ASSESSMENT — PAIN DESCRIPTION - LOCATION
LOCATION: ABDOMEN;INCISION
LOCATION: INCISION

## 2023-04-19 ASSESSMENT — PAIN DESCRIPTION - DESCRIPTORS: DESCRIPTORS: DISCOMFORT

## 2023-04-19 ASSESSMENT — PAIN - FUNCTIONAL ASSESSMENT: PAIN_FUNCTIONAL_ASSESSMENT: ACTIVITIES ARE NOT PREVENTED

## 2023-04-19 NOTE — LACTATION NOTE
Mom reports her baby has been sleepy. Encouraged her to unswaddle baby and place him skin to skin. Mom to call out when baby alerts for a feeding.

## 2023-04-19 NOTE — CARE COORDINATION
Social Work     Sw reviewed medical record (current active problem list) and tox screens and found that mom was +THC on 11/1/22, but -THC at delivery. Sw spoke with mom briefly to explain Sw role, inquire if any needs or concerns, and provide safe sleep education and discuss. Mom denied any needs or questions and informs baby has a safe sleep environment (Danni, claude, pnp). Mom denied any current s/s of anxiety or depression and is aware to reach out to OB if any s/s occur after dc. Mom reports a good support system that includes Fob and sister and denied any current questions or needs. Mom reports this is her 2nd child. Mom reports that she resides with Fob and 13 y.o. daughter. Mom reports that she is connected with WIC and [facility]. Mom states ped will be Dr. Jason Rodriguez. [facility] referral made due to [employer]). Sw did provide mom with Triple P (Positive Parenting Program) flyer so she is aware of this new free resource in state of [place]. Sw encouraged mom to reach out if any issues or concerns arise. No other concerns, baby is clear to dc with mom.              Sw Intern Marivel Sauer

## 2023-04-20 VITALS
BODY MASS INDEX: 41.16 KG/M2 | WEIGHT: 218 LBS | TEMPERATURE: 97.9 F | RESPIRATION RATE: 16 BRPM | SYSTOLIC BLOOD PRESSURE: 114 MMHG | HEIGHT: 61 IN | HEART RATE: 87 BPM | DIASTOLIC BLOOD PRESSURE: 68 MMHG | OXYGEN SATURATION: 97 %

## 2023-04-20 LAB — SURGICAL PATHOLOGY REPORT: NORMAL

## 2023-04-20 PROCEDURE — 6370000000 HC RX 637 (ALT 250 FOR IP): Performed by: STUDENT IN AN ORGANIZED HEALTH CARE EDUCATION/TRAINING PROGRAM

## 2023-04-20 PROCEDURE — 6360000002 HC RX W HCPCS: Performed by: STUDENT IN AN ORGANIZED HEALTH CARE EDUCATION/TRAINING PROGRAM

## 2023-04-20 RX ADMIN — ENOXAPARIN SODIUM 40 MG: 40 INJECTION SUBCUTANEOUS at 08:51

## 2023-04-20 RX ADMIN — METRONIDAZOLE 500 MG: 500 TABLET, FILM COATED ORAL at 06:59

## 2023-04-20 RX ADMIN — ACETAMINOPHEN 1000 MG: 500 TABLET ORAL at 08:50

## 2023-04-20 RX ADMIN — CEPHALEXIN 500 MG: 500 CAPSULE ORAL at 06:59

## 2023-04-20 RX ADMIN — ACETAMINOPHEN 1000 MG: 500 TABLET ORAL at 02:00

## 2023-04-20 RX ADMIN — Medication 1 TABLET: at 08:51

## 2023-04-20 RX ADMIN — IBUPROFEN 600 MG: 600 TABLET, FILM COATED ORAL at 04:52

## 2023-04-20 RX ADMIN — DOCUSATE SODIUM 100 MG: 100 CAPSULE ORAL at 08:51

## 2023-04-20 ASSESSMENT — PAIN SCALES - GENERAL
PAINLEVEL_OUTOF10: 4
PAINLEVEL_OUTOF10: 3

## 2023-04-20 ASSESSMENT — PAIN DESCRIPTION - LOCATION: LOCATION: ABDOMEN;INCISION

## 2023-04-20 NOTE — PLAN OF CARE
Problem: ABCDS Injury Assessment  Goal: Absence of physical injury  Outcome: Progressing     Problem: Pain  Goal: Verbalizes/displays adequate comfort level or baseline comfort level  Outcome: Progressing
Problem: ABCDS Injury Assessment  Goal: Absence of physical injury  Outcome: Progressing     Problem: Pain  Goal: Verbalizes/displays adequate comfort level or baseline comfort level  Outcome: Progressing     Problem: Postpartum  Goal: Experiences normal postpartum course  Description:  Postpartum OB-Pregnancy care plan goal which identifies if the mother is experiencing a normal postpartum course  Outcome: Progressing  Goal: Appropriate maternal -  bonding  Description:  Postpartum OB-Pregnancy care plan goal which identifies if the mother and  are bonding appropriately  Outcome: Progressing  Goal: Establishment of infant feeding pattern  Description:  Postpartum OB-Pregnancy care plan goal which identifies if the mother is establishing a feeding pattern with their   Outcome: Progressing  Goal: Incisions, wounds, or drain sites healing without S/S of infection  Outcome: Progressing     Problem: Safety - Adult  Goal: Free from fall injury  Outcome: Progressing
Free from fall injury  4/18/2023 1818 by Ilsa Israel RN  Outcome: Progressing  4/18/2023 1818 by Ilsa Israel RN  Outcome: Progressing  4/18/2023 1815 by Ilsa Israel RN  Outcome: Progressing     Problem: Discharge Planning  Goal: Discharge to home or other facility with appropriate resources  4/18/2023 1818 by Ilsa Israel RN  Outcome: Progressing  4/18/2023 1818 by Ilsa Israel RN  Outcome: Progressing  4/18/2023 1815 by Ilsa Israel RN  Outcome: Progressing

## 2023-04-20 NOTE — PROGRESS NOTES
CLINICAL PHARMACY NOTE: MEDS TO BEDS    Total # of Prescriptions Filled: 6   The following medications were delivered to the patient:  Oxycodone  Ibuprofen  Gas relief  Tylenol  Senexon  ferosul    Additional Documentation:

## 2023-04-20 NOTE — FLOWSHEET NOTE
I have reviewed all AWHON[facility] Post-Birth Warning Signs and essential teaching points for pulmonary embolism, cardiac disease, hypertensive disorders of pregnancy, obstetric hemorrhage, venous thromboembolism, infection, and postpartum depression with the patient and fob (support person) . I have informed the patient on when to call their healthcare provider and when to call 911. I have discussed with the patient  the importance of scheduling a follow-up visit with their physician, nurse practitioner or midwife and provided them with correct contact information for appointment. I have provided the patient with a copy of the \"Save Your Life\" handout. The patient has acknowledged receiving and understanding this education with her signature.

## 2023-04-20 NOTE — LACTATION NOTE
Discharge information given to pt, pumping and bottle feeding at this time. Pt says she is getting 15 mls with pumping sessions. Has a pump at home. Writer noted baby to have thick labial frenulum and anterior lingual frenulum noted as well. Pediatric dentist information given to the patient and strongly encouraged to reach out to lactation with any questions or concerns.

## 2023-04-20 NOTE — PROGRESS NOTES
POST OPERATIVE DAY # 2    Connee Osgood is a 26 y.o. female   This patient was seen and examined today. RLTCS on 23    Her pregnancy was complicated by:   Patient Active Problem List   Diagnosis    Herpes genitalia    Pregravid BMI 35.73    H/O  delivery, currently pregnant    H/O classical  section x1    H/O diverticulitis of descending colon    Dyslipidemia    Advacned maternal age    High risk pregnancy, antepartum    Cigarette smoker    H/O peptic ulcer    Patient desires risk reducing bilateral salpingectomy    H/O marijuana use    Unvaccinated for covid-19    Bicornate uterus    cHTN (no meds)    Elevated early 1 hour GTT    Family history of breast cancer    FHx of blood clots    CMV Ab +     36 weeks gestation of pregnancy    RLTCS w/ RRS 23 F Apg 8/9 Wt 6#15       Today she is doing well without any chief complaint. Her lochia is light. She denies chest pain, shortness of breath, headache, lightheadedness, blurred vision and peripheral edema. She is breast feeding and she denies any signs or symptoms of mastitis. She is ambulating well. She is voiding without difficulty. She currently denies S/S of postpartum depression. Flatus present. Bowel movement absent. She is tolerating solids.     Vital Signs:  Vitals:    23 0400 23 0800 23 1600 23   BP: 111/70 123/72 120/74 121/72   Pulse: 96 98 96 91   Resp: 16 15 16 16   Temp: 98.8 °F (37.1 °C) 99 °F (37.2 °C) 98.2 °F (36.8 °C) 97.9 °F (36.6 °C)   TempSrc: Oral Oral Oral Oral   SpO2:  96% 96% 96%   Weight:       Height:              Urine Input & Output last 24hrs:     Intake/Output Summary (Last 24 hours) at 2023 0020  Last data filed at 2023 0130  Gross per 24 hour   Intake --   Output 500 ml   Net -500 ml       Physical Exam:  General:  no apparent distress, alert and cooperative  Neurologic:  alert, oriented, normal speech, no focal findings or movement disorder noted  Lungs:  No increased work

## 2023-04-25 ENCOUNTER — POSTPARTUM VISIT (OUTPATIENT)
Dept: OBGYN | Age: 33
End: 2023-04-25

## 2023-04-25 VITALS
DIASTOLIC BLOOD PRESSURE: 88 MMHG | BODY MASS INDEX: 39.84 KG/M2 | SYSTOLIC BLOOD PRESSURE: 135 MMHG | WEIGHT: 211 LBS | HEART RATE: 88 BPM | HEIGHT: 61 IN

## 2023-04-25 DIAGNOSIS — R73.09 ABNORMAL GTT (GLUCOSE TOLERANCE TEST): ICD-10-CM

## 2023-04-25 DIAGNOSIS — Z98.891 STATUS POST CESAREAN SECTION: Primary | ICD-10-CM

## 2023-04-25 DIAGNOSIS — R03.0 ELEVATED BLOOD PRESSURE READING: ICD-10-CM

## 2023-04-25 DIAGNOSIS — Z98.891 H/O CESAREAN SECTION: ICD-10-CM

## 2023-04-26 ENCOUNTER — OFFICE VISIT (OUTPATIENT)
Dept: INTERNAL MEDICINE | Age: 33
End: 2023-04-26
Payer: MEDICAID

## 2023-04-26 ENCOUNTER — HOSPITAL ENCOUNTER (OUTPATIENT)
Age: 33
Setting detail: SPECIMEN
Discharge: HOME | End: 2023-04-26

## 2023-04-26 VITALS
WEIGHT: 211.2 LBS | BODY MASS INDEX: 39.88 KG/M2 | DIASTOLIC BLOOD PRESSURE: 80 MMHG | HEIGHT: 61 IN | SYSTOLIC BLOOD PRESSURE: 128 MMHG | HEART RATE: 81 BPM | TEMPERATURE: 97.5 F | OXYGEN SATURATION: 97 %

## 2023-04-26 DIAGNOSIS — M77.8 TENDONITIS OF BOTH WRISTS: ICD-10-CM

## 2023-04-26 DIAGNOSIS — R60.0 LOWER EXTREMITY EDEMA: ICD-10-CM

## 2023-04-26 DIAGNOSIS — L98.9 SKIN LESION: ICD-10-CM

## 2023-04-26 DIAGNOSIS — K21.9 GASTROESOPHAGEAL REFLUX DISEASE WITHOUT ESOPHAGITIS: ICD-10-CM

## 2023-04-26 DIAGNOSIS — R03.0 ELEVATED BLOOD PRESSURE READING WITHOUT DIAGNOSIS OF HYPERTENSION: Primary | ICD-10-CM

## 2023-04-26 DIAGNOSIS — L72.0 EPIDERMOID CYST OF SKIN OF THIGH: ICD-10-CM

## 2023-04-26 LAB
BILIRUBIN, POC: ABNORMAL
BLOOD URINE, POC: ABNORMAL
CLARITY, POC: ABNORMAL
COLOR, POC: YELLOW
CREATININE URINE: 60.8 MG/DL (ref 28–217)
GLUCOSE URINE, POC: ABNORMAL
KETONES, POC: ABNORMAL
LEUKOCYTE EST, POC: ABNORMAL
NITRITE, POC: ABNORMAL
PH, POC: 6
PROTEIN, POC: ABNORMAL
SPECIFIC GRAVITY, POC: 1.01
TOTAL PROTEIN, URINE: 6 MG/DL
URINE TOTAL PROTEIN CREATININE RATIO: 0.1 (ref 0–0.2)
UROBILINOGEN, POC: 0.2

## 2023-04-26 PROCEDURE — 1036F TOBACCO NON-USER: CPT | Performed by: STUDENT IN AN ORGANIZED HEALTH CARE EDUCATION/TRAINING PROGRAM

## 2023-04-26 PROCEDURE — 1111F DSCHRG MED/CURRENT MED MERGE: CPT | Performed by: STUDENT IN AN ORGANIZED HEALTH CARE EDUCATION/TRAINING PROGRAM

## 2023-04-26 PROCEDURE — 81002 URINALYSIS NONAUTO W/O SCOPE: CPT | Performed by: STUDENT IN AN ORGANIZED HEALTH CARE EDUCATION/TRAINING PROGRAM

## 2023-04-26 PROCEDURE — G8427 DOCREV CUR MEDS BY ELIG CLIN: HCPCS | Performed by: STUDENT IN AN ORGANIZED HEALTH CARE EDUCATION/TRAINING PROGRAM

## 2023-04-26 PROCEDURE — G8417 CALC BMI ABV UP PARAM F/U: HCPCS | Performed by: STUDENT IN AN ORGANIZED HEALTH CARE EDUCATION/TRAINING PROGRAM

## 2023-04-26 PROCEDURE — 99214 OFFICE O/P EST MOD 30 MIN: CPT | Performed by: STUDENT IN AN ORGANIZED HEALTH CARE EDUCATION/TRAINING PROGRAM

## 2023-04-26 RX ORDER — FAMOTIDINE 20 MG/1
20 TABLET, FILM COATED ORAL 2 TIMES DAILY PRN
Qty: 60 TABLET | Refills: 3 | Status: SHIPPED | OUTPATIENT
Start: 2023-04-26

## 2023-04-26 SDOH — ECONOMIC STABILITY: HOUSING INSECURITY
IN THE LAST 12 MONTHS, WAS THERE A TIME WHEN YOU DID NOT HAVE A STEADY PLACE TO SLEEP OR SLEPT IN A SHELTER (INCLUDING NOW)?: NO

## 2023-04-26 SDOH — ECONOMIC STABILITY: FOOD INSECURITY: WITHIN THE PAST 12 MONTHS, THE FOOD YOU BOUGHT JUST DIDN'T LAST AND YOU DIDN'T HAVE MONEY TO GET MORE.: NEVER TRUE

## 2023-04-26 SDOH — ECONOMIC STABILITY: INCOME INSECURITY: HOW HARD IS IT FOR YOU TO PAY FOR THE VERY BASICS LIKE FOOD, HOUSING, MEDICAL CARE, AND HEATING?: NOT HARD AT ALL

## 2023-04-26 SDOH — ECONOMIC STABILITY: FOOD INSECURITY: WITHIN THE PAST 12 MONTHS, YOU WORRIED THAT YOUR FOOD WOULD RUN OUT BEFORE YOU GOT MONEY TO BUY MORE.: NEVER TRUE

## 2023-04-26 NOTE — PROGRESS NOTES
her necklaces. No contact bleeding. Interested in removal. No Hx of skin Ca. Lump on anterior thigh: small cyst/lump in left anterior thigh. Present for years and not growing or changing. Soft, mildly tender with deep palpation. No overlying warmth, erythema, drainage. Has h/o lipoma that were removed in past.     Past Medical History:   Diagnosis Date    Chlamydia     HSV     Infertility, female      delivery     STD (sexually transmitted disease) 2017    Herpes; last outbreak 10/10/22    Uterine disorder      Past Surgical History:   Procedure Laterality Date     SECTION       SECTION N/A 2023     SECTION performed by Margeret Angelucci, DO at [facility] L&D OR     ALLERGIES    No Known Allergies      MEDICATIONS:      Current Outpatient Medications on File Prior to Visit   Medication Sig Dispense Refill    ibuprofen (ADVIL;MOTRIN) 600 MG tablet Take 1 tablet by mouth every 6 hours as needed for Pain 30 tablet 1    simethicone (MYLICON) 80 MG chewable tablet Take 1 tablet by mouth 4 times daily as needed for Flatulence 90 tablet 0    sennosides-docusate sodium (SENOKOT-S) 8.6-50 MG tablet Take 1 tablet by mouth daily 30 tablet 0    ferrous sulfate (IRON 325) 325 (65 Fe) MG tablet Take 1 tablet by mouth 2 times daily (Patient not taking: Reported on 2023) 60 tablet 2    acetaminophen (TYLENOL) 500 MG tablet Take 2 tablets by mouth every 6 hours as needed for Pain (Patient not taking: Reported on 2023) 60 tablet 1    Misc.  Devices (BREAST PUMP) MISC 1 Units by Does not apply route as needed (breast pumping) (Patient not taking: Reported on ) 1 each 0    folic acid (FOLVITE) 1 MG tablet Take 1 tablet by mouth daily (Patient not taking: Reported on 2023)      Prenatal Vit-DSS-Fe Fum-FA (PRENATAL 19) 29-1 MG TABS Take 1 tablet by mouth daily (Patient not taking: Reported on 2023) 30 tablet 11     No current facility-administered medications on file prior to

## 2023-04-26 NOTE — PROGRESS NOTES
Attending Physician Statement  I have discussed the care of Andrew Peña, including pertinent history and exam findings,  with the resident. I have reviewed the key elements of all parts of the encounter with the resident. I agree with the assessment, plan and orders as documented by the resident.   (GE Modifier)    Electronically signed by Chanda Justin MD  4/26/2023  9:36 AM
first trimester, 10/10/22, approximately 9 weeks GA       Return in about 5 weeks (around 5/30/2023) for 6wk PP. Counseling Completed:  Signs & Symptoms of mastitis and when to notify office discussed.   Secondary smoke risks including increased risks of respiratory problems, Sudden infant death syndrome, and potential malignancies discussed  Abstinence, family planning counseling and STD counseling discussed  Continue with post operative restrictions until 6 weeks post partum  No heavy lifting or [place] until 6 weeks post partum    Evan Andrade DO  Ob/Gyn Resident  [facility], [facility]  4/25/2023, 9:28 AM

## 2023-05-30 PROBLEM — Z09 POSTOP CHECK: Status: ACTIVE | Noted: 2023-05-30

## 2023-05-30 NOTE — PROGRESS NOTES
Postpartum Visit    Georges Rodriguez is a 26 y.o. female , 6 weeks Post Partum s/p  repeat  section, low transverse incision with RRBS on 23    The patient was seen. She has no chief complaint today. Her pregnancy was complicated by cHTN (no meds), elevated early 1 hour GTT and elevated early 3 hour GTT. She is not breast feeding and denies signs or symptoms of mastitis. The patient completed the E.P.D.S. Post Partum Depression Evaluation form and EPDS Score of 0. She does not have signs or symptoms of post partum depression. She denies any suicidal thoughts with a plan, intent to harm others, and delusional ideas. She does admit to having good home support. Today her lochia is light she denies any dizziness or shortness of breath. Her bowels are regular and she denies any urinary tract symptomology. She is using RRBS secondary to her risk factors placing her at an increased risk of ovarian cancer and condoms for STD prevention. Her pregnancy was complicated by:  Patient Active Problem List    Diagnosis Date Noted    Dyslipidemia 10/26/2022     Priority: Medium    Advanced maternal age 10/26/2022     Priority: Medium    Cigarette smoker 10/26/2022     Priority: Medium     Overview Note:     10/26/22: Patient reports having smoked a pack a day before pregnancy. Patient reports now smoking just \"a few\" cigarettes a day. Patient counseled on maternal/fetal risk factors. Patient verbalizes understanding       Patient desires risk reducing bilateral salpingectomy 10/26/2022     Priority: Medium    H/O  delivery, currently pregnant 10/25/2022     Priority: Medium     Overview Note:     2008: Patient states she had had intercourse with FOB and she started bleeding very heavily. Patient reports she was placed on bedrest in the hospital, was there for two weeks, got up one day and was passing large clots, at which point she was advised she would be having an emergency C section.  Patient

## 2023-05-31 ENCOUNTER — POSTPARTUM VISIT (OUTPATIENT)
Dept: OBGYN | Age: 33
End: 2023-05-31
Payer: MEDICAID

## 2023-05-31 VITALS
HEART RATE: 69 BPM | WEIGHT: 207 LBS | SYSTOLIC BLOOD PRESSURE: 132 MMHG | BODY MASS INDEX: 39.11 KG/M2 | DIASTOLIC BLOOD PRESSURE: 86 MMHG

## 2023-05-31 DIAGNOSIS — Z98.891 H/O CESAREAN SECTION: Primary | ICD-10-CM

## 2023-05-31 DIAGNOSIS — R03.0 ELEVATED BLOOD PRESSURE READING: ICD-10-CM

## 2023-05-31 DIAGNOSIS — R03.0 ELEVATED BLOOD PRESSURE READING WITHOUT DIAGNOSIS OF HYPERTENSION: ICD-10-CM

## 2023-05-31 DIAGNOSIS — K21.9 GASTROESOPHAGEAL REFLUX DISEASE WITHOUT ESOPHAGITIS: ICD-10-CM

## 2023-05-31 DIAGNOSIS — E78.5 DYSLIPIDEMIA: ICD-10-CM

## 2023-05-31 DIAGNOSIS — Z80.3 FAMILY HISTORY OF BREAST CANCER: ICD-10-CM

## 2023-05-31 DIAGNOSIS — Z98.891 HISTORY OF CLASSICAL CESAREAN SECTION: ICD-10-CM

## 2023-05-31 DIAGNOSIS — Z09 POSTOP CHECK: ICD-10-CM

## 2023-05-31 DIAGNOSIS — Z82.49 FAMILY HISTORY OF BLOOD CLOTS: ICD-10-CM

## 2023-05-31 DIAGNOSIS — Q51.3 BICORNATE UTERUS: ICD-10-CM

## 2023-05-31 DIAGNOSIS — R73.09 ABNORMAL GTT (GLUCOSE TOLERANCE TEST): ICD-10-CM

## 2023-05-31 DIAGNOSIS — Z90.79 H/O BILATERAL SALPINGECTOMY: ICD-10-CM

## 2023-05-31 PROCEDURE — 99211 OFF/OP EST MAY X REQ PHY/QHP: CPT | Performed by: OBSTETRICS & GYNECOLOGY

## 2023-06-15 ENCOUNTER — NURSE ONLY (OUTPATIENT)
Dept: LAB | Age: 33
End: 2023-06-15

## 2023-06-20 NOTE — RESULT ENCOUNTER NOTE
Please inform patient that his excision showed a pilar cyst as expected. Let me know if he/she has any questions or concerns about healing.

## 2023-06-27 ENCOUNTER — NURSE ONLY (OUTPATIENT)
Dept: DERMATOLOGY | Age: 33
End: 2023-06-27

## 2023-06-27 VITALS — TEMPERATURE: 97.2 F | BODY MASS INDEX: 40.02 KG/M2 | HEIGHT: 61 IN | WEIGHT: 212 LBS

## 2023-06-27 DIAGNOSIS — Z48.02 ENCOUNTER FOR REMOVAL OF SUTURES: Primary | ICD-10-CM

## 2023-06-27 PROCEDURE — 99024 POSTOP FOLLOW-UP VISIT: CPT | Performed by: DERMATOLOGY

## 2023-06-29 PROBLEM — Z09 POSTOP CHECK: Status: RESOLVED | Noted: 2023-05-30 | Resolved: 2023-06-29

## 2023-12-01 ENCOUNTER — OFFICE VISIT (OUTPATIENT)
Dept: FAMILY MEDICINE CLINIC | Age: 33
End: 2023-12-01
Payer: MEDICAID

## 2023-12-01 VITALS
HEART RATE: 90 BPM | SYSTOLIC BLOOD PRESSURE: 124 MMHG | DIASTOLIC BLOOD PRESSURE: 84 MMHG | OXYGEN SATURATION: 99 % | TEMPERATURE: 97.8 F | BODY MASS INDEX: 39.68 KG/M2 | WEIGHT: 210 LBS

## 2023-12-01 DIAGNOSIS — S39.012A LUMBAR STRAIN, INITIAL ENCOUNTER: Primary | ICD-10-CM

## 2023-12-01 PROCEDURE — 99213 OFFICE O/P EST LOW 20 MIN: CPT | Performed by: NURSE PRACTITIONER

## 2023-12-01 PROCEDURE — G8484 FLU IMMUNIZE NO ADMIN: HCPCS | Performed by: NURSE PRACTITIONER

## 2023-12-01 PROCEDURE — G8427 DOCREV CUR MEDS BY ELIG CLIN: HCPCS | Performed by: NURSE PRACTITIONER

## 2023-12-01 PROCEDURE — 96372 THER/PROPH/DIAG INJ SC/IM: CPT | Performed by: NURSE PRACTITIONER

## 2023-12-01 PROCEDURE — G8417 CALC BMI ABV UP PARAM F/U: HCPCS | Performed by: NURSE PRACTITIONER

## 2023-12-01 PROCEDURE — 1036F TOBACCO NON-USER: CPT | Performed by: NURSE PRACTITIONER

## 2023-12-01 RX ORDER — KETOROLAC TROMETHAMINE 30 MG/ML
60 INJECTION, SOLUTION INTRAMUSCULAR; INTRAVENOUS ONCE
Status: COMPLETED | OUTPATIENT
Start: 2023-12-01 | End: 2023-12-01

## 2023-12-01 RX ORDER — CYCLOBENZAPRINE HCL 10 MG
10 TABLET ORAL 3 TIMES DAILY PRN
Qty: 21 TABLET | Refills: 0 | Status: SHIPPED | OUTPATIENT
Start: 2023-12-01 | End: 2023-12-11

## 2023-12-01 RX ORDER — PREDNISONE 20 MG/1
20 TABLET ORAL 2 TIMES DAILY
Qty: 10 TABLET | Refills: 0 | Status: SHIPPED | OUTPATIENT
Start: 2023-12-01 | End: 2023-12-06

## 2023-12-01 RX ADMIN — KETOROLAC TROMETHAMINE 60 MG: 30 INJECTION, SOLUTION INTRAMUSCULAR; INTRAVENOUS at 10:11

## 2023-12-01 ASSESSMENT — ENCOUNTER SYMPTOMS
BACK PAIN: 1
BOWEL INCONTINENCE: 0
ABDOMINAL PAIN: 0

## 2023-12-01 NOTE — PROGRESS NOTES
ibuprofen (ADVIL;MOTRIN) 600 MG tablet Take 1 tablet by mouth every 6 hours as needed for Pain 30 tablet 1    famotidine (PEPCID) 20 MG tablet Take 1 tablet by mouth 2 times daily as needed (GERD) (Patient not taking: Reported on 5/31/2023) 60 tablet 3    acetaminophen (TYLENOL) 500 MG tablet Take 2 tablets by mouth every 6 hours as needed for Pain (Patient not taking: Reported on 4/25/2023) 60 tablet 1    Prenatal Vit-DSS-Fe Fum-FA (PRENATAL 19) 29-1 MG TABS Take 1 tablet by mouth daily (Patient not taking: Reported on 4/25/2023) 30 tablet 11     Current Facility-Administered Medications   Medication Dose Route Frequency Provider Last Rate Last Admin    ketorolac (TORADOL) injection 60 mg  60 mg IntraMUSCular Once Brittani Ped, APRN - CNP        lidocaine-EPINEPHrine 1 %-1:[id number] injection 2 mL  2 mL IntraDERmal Once Delicia Garcia MD         No Known Allergies    Subjective:      Review of Systems   Constitutional:  Negative for fever and weight loss. Cardiovascular:  Negative for chest pain. Gastrointestinal:  Negative for abdominal pain and bowel incontinence. Genitourinary:  Negative for bladder incontinence, dysuria and pelvic pain. Musculoskeletal:  Positive for back pain. Neurological:  Negative for tingling, weakness, numbness, headaches and paresthesias. All other systems reviewed and are negative. 14 systems reviewed and negative except as listed in HPI. Objective:     Physical Exam  Vitals and nursing note reviewed. Constitutional:       General: She is not in acute distress. Appearance: Normal appearance. She is well-developed. She is not ill-appearing, toxic-appearing or diaphoretic. Comments: Well appearing, nontoxic   HENT:      Head: Normocephalic and atraumatic. Right Ear: External ear normal.      Left Ear: External ear normal.   Eyes:      General:         Right eye: No discharge. Left eye: No discharge.       Conjunctiva/sclera: Conjunctivae

## 2023-12-08 ENCOUNTER — OFFICE VISIT (OUTPATIENT)
Dept: FAMILY MEDICINE CLINIC | Age: 33
End: 2023-12-08
Payer: MEDICAID

## 2023-12-08 VITALS
DIASTOLIC BLOOD PRESSURE: 80 MMHG | TEMPERATURE: 99.2 F | HEART RATE: 81 BPM | OXYGEN SATURATION: 97 % | SYSTOLIC BLOOD PRESSURE: 117 MMHG

## 2023-12-08 DIAGNOSIS — M54.50 ACUTE RIGHT-SIDED LOW BACK PAIN WITHOUT SCIATICA: Primary | ICD-10-CM

## 2023-12-08 PROCEDURE — G8484 FLU IMMUNIZE NO ADMIN: HCPCS

## 2023-12-08 PROCEDURE — G8417 CALC BMI ABV UP PARAM F/U: HCPCS

## 2023-12-08 PROCEDURE — 96372 THER/PROPH/DIAG INJ SC/IM: CPT

## 2023-12-08 PROCEDURE — G8427 DOCREV CUR MEDS BY ELIG CLIN: HCPCS

## 2023-12-08 PROCEDURE — 99213 OFFICE O/P EST LOW 20 MIN: CPT

## 2023-12-08 PROCEDURE — 1036F TOBACCO NON-USER: CPT

## 2023-12-08 RX ORDER — KETOROLAC TROMETHAMINE 30 MG/ML
60 INJECTION, SOLUTION INTRAMUSCULAR; INTRAVENOUS ONCE
Status: COMPLETED | OUTPATIENT
Start: 2023-12-08 | End: 2023-12-08

## 2023-12-08 RX ORDER — CELECOXIB 100 MG/1
100 CAPSULE ORAL 2 TIMES DAILY
Qty: 28 CAPSULE | Refills: 0 | Status: CANCELLED | OUTPATIENT
Start: 2023-12-08 | End: 2023-12-22

## 2023-12-08 RX ADMIN — KETOROLAC TROMETHAMINE 60 MG: 30 INJECTION, SOLUTION INTRAMUSCULAR; INTRAVENOUS at 14:20

## 2023-12-08 ASSESSMENT — ENCOUNTER SYMPTOMS
ABDOMINAL PAIN: 0
BOWEL INCONTINENCE: 0
EYE REDNESS: 0
EYE PAIN: 0
BACK PAIN: 1

## 2023-12-08 NOTE — PROGRESS NOTES
[facility]  [address] [address]  Dept: [phone]  Dept Fax: [phone]    Angelina Liang is a 26 y.o. female who presents to the urgent care today for her medical conditions/complaints as notedbelow. Angelina Liang is c/o of Back Pain (Was seen  for back pain given injection felt better pain started again yesterday and pulling dalton tree out. )      HPI:     Patient presents to the [address] [place] [address] for evaluation of back pain. Patient was seen in walk in clinic on  for the same cc. She was given a Toradol injection, oral prednisone and flexeril. Patient reports that yesterday she bent down to get her dalton tree out and exacerbated her back pain. She reports she has a follow up with her PCP on Friday 12/15. She is inquiring about another Toradol shot today. Back Pain  This is a recurrent problem. The current episode started yesterday. The problem occurs constantly. The problem has been gradually worsening since onset. The pain is present in the lumbar spine and gluteal. Quality: pulling sensation. The pain does not radiate. The pain is moderate. The pain is Worse during the night. The symptoms are aggravated by lying down. Pertinent negatives include no abdominal pain, bladder incontinence, bowel incontinence, chest pain, dysuria, fever, headaches, leg pain, numbness, paresis, paresthesias, pelvic pain, perianal numbness, tingling, weakness or weight loss. She has tried muscle relaxant, NSAIDs and heat for the symptoms. The treatment provided mild relief.        Past Medical History:   Diagnosis Date    Chlamydia     HSV     Infertility, female      delivery     STD (sexually transmitted disease) 2017    Herpes; last outbreak 10/10/22    Uterine disorder         Current Outpatient Medications   Medication Sig Dispense Refill    cyclobenzaprine (FLEXERIL) 10 MG tablet Take

## 2024-09-17 ENCOUNTER — OFFICE VISIT (OUTPATIENT)
Dept: INTERNAL MEDICINE | Age: 34
End: 2024-09-17
Payer: MEDICAID

## 2024-09-17 VITALS
SYSTOLIC BLOOD PRESSURE: 130 MMHG | WEIGHT: 202.6 LBS | DIASTOLIC BLOOD PRESSURE: 78 MMHG | TEMPERATURE: 98.1 F | HEART RATE: 84 BPM | BODY MASS INDEX: 38.25 KG/M2 | HEIGHT: 61 IN | OXYGEN SATURATION: 99 %

## 2024-09-17 DIAGNOSIS — Z13.1 SCREENING FOR DIABETES MELLITUS: Primary | ICD-10-CM

## 2024-09-17 DIAGNOSIS — Z00.00 ROUTINE ADULT HEALTH MAINTENANCE: ICD-10-CM

## 2024-09-17 DIAGNOSIS — R03.0 ELEVATED BLOOD PRESSURE READING: ICD-10-CM

## 2024-09-17 DIAGNOSIS — E78.5 HYPERLIPIDEMIA, UNSPECIFIED HYPERLIPIDEMIA TYPE: ICD-10-CM

## 2024-09-17 DIAGNOSIS — E66.01 MORBID OBESITY (HCC): ICD-10-CM

## 2024-09-17 LAB — HBA1C MFR BLD: 5.2 %

## 2024-09-17 PROCEDURE — 99202 OFFICE O/P NEW SF 15 MIN: CPT

## 2024-09-17 PROCEDURE — 83036 HEMOGLOBIN GLYCOSYLATED A1C: CPT

## 2024-09-17 SDOH — ECONOMIC STABILITY: FOOD INSECURITY: WITHIN THE PAST 12 MONTHS, THE FOOD YOU BOUGHT JUST DIDN'T LAST AND YOU DIDN'T HAVE MONEY TO GET MORE.: NEVER TRUE

## 2024-09-17 SDOH — ECONOMIC STABILITY: INCOME INSECURITY: HOW HARD IS IT FOR YOU TO PAY FOR THE VERY BASICS LIKE FOOD, HOUSING, MEDICAL CARE, AND HEATING?: SOMEWHAT HARD

## 2024-09-17 SDOH — ECONOMIC STABILITY: FOOD INSECURITY: WITHIN THE PAST 12 MONTHS, YOU WORRIED THAT YOUR FOOD WOULD RUN OUT BEFORE YOU GOT MONEY TO BUY MORE.: NEVER TRUE

## 2024-09-17 ASSESSMENT — PATIENT HEALTH QUESTIONNAIRE - PHQ9
SUM OF ALL RESPONSES TO PHQ QUESTIONS 1-9: 0
2. FEELING DOWN, DEPRESSED OR HOPELESS: NOT AT ALL
SUM OF ALL RESPONSES TO PHQ QUESTIONS 1-9: 0
1. LITTLE INTEREST OR PLEASURE IN DOING THINGS: NOT AT ALL
SUM OF ALL RESPONSES TO PHQ9 QUESTIONS 1 & 2: 0

## 2024-09-17 ASSESSMENT — ENCOUNTER SYMPTOMS
RESPIRATORY NEGATIVE: 1
GASTROINTESTINAL NEGATIVE: 1
EYES NEGATIVE: 1
ALLERGIC/IMMUNOLOGIC NEGATIVE: 1

## 2024-09-18 ENCOUNTER — TELEPHONE (OUTPATIENT)
Dept: INTERNAL MEDICINE | Age: 34
End: 2024-09-18

## 2024-12-17 ENCOUNTER — OFFICE VISIT (OUTPATIENT)
Dept: INTERNAL MEDICINE | Age: 34
End: 2024-12-17
Payer: MEDICAID

## 2024-12-17 VITALS
HEART RATE: 68 BPM | WEIGHT: 204 LBS | TEMPERATURE: 97 F | SYSTOLIC BLOOD PRESSURE: 126 MMHG | RESPIRATION RATE: 16 BRPM | HEIGHT: 61 IN | BODY MASS INDEX: 38.51 KG/M2 | OXYGEN SATURATION: 98 % | DIASTOLIC BLOOD PRESSURE: 78 MMHG

## 2024-12-17 DIAGNOSIS — E66.9 OBESITY (BMI 35.0-39.9 WITHOUT COMORBIDITY): Primary | ICD-10-CM

## 2024-12-17 DIAGNOSIS — F17.210 CIGARETTE SMOKER: ICD-10-CM

## 2024-12-17 PROCEDURE — 99213 OFFICE O/P EST LOW 20 MIN: CPT

## 2024-12-17 PROCEDURE — G8417 CALC BMI ABV UP PARAM F/U: HCPCS

## 2024-12-17 PROCEDURE — G8484 FLU IMMUNIZE NO ADMIN: HCPCS

## 2024-12-17 PROCEDURE — G8427 DOCREV CUR MEDS BY ELIG CLIN: HCPCS

## 2024-12-17 PROCEDURE — 4004F PT TOBACCO SCREEN RCVD TLK: CPT

## 2024-12-17 RX ORDER — PHENTERMINE HYDROCHLORIDE 37.5 MG/1
37.5 CAPSULE ORAL EVERY MORNING
Qty: 30 CAPSULE | Refills: 0 | Status: SHIPPED | OUTPATIENT
Start: 2024-12-17 | End: 2025-01-16

## 2024-12-17 ASSESSMENT — PATIENT HEALTH QUESTIONNAIRE - PHQ9
9. THOUGHTS THAT YOU WOULD BE BETTER OFF DEAD, OR OF HURTING YOURSELF: NOT AT ALL
SUM OF ALL RESPONSES TO PHQ9 QUESTIONS 1 & 2: 0
SUM OF ALL RESPONSES TO PHQ QUESTIONS 1-9: 0
4. FEELING TIRED OR HAVING LITTLE ENERGY: NOT AT ALL
7. TROUBLE CONCENTRATING ON THINGS, SUCH AS READING THE NEWSPAPER OR WATCHING TELEVISION: NOT AT ALL
10. IF YOU CHECKED OFF ANY PROBLEMS, HOW DIFFICULT HAVE THESE PROBLEMS MADE IT FOR YOU TO DO YOUR WORK, TAKE CARE OF THINGS AT HOME, OR GET ALONG WITH OTHER PEOPLE: NOT DIFFICULT AT ALL
SUM OF ALL RESPONSES TO PHQ QUESTIONS 1-9: 0
SUM OF ALL RESPONSES TO PHQ QUESTIONS 1-9: 0
6. FEELING BAD ABOUT YOURSELF - OR THAT YOU ARE A FAILURE OR HAVE LET YOURSELF OR YOUR FAMILY DOWN: NOT AT ALL
8. MOVING OR SPEAKING SO SLOWLY THAT OTHER PEOPLE COULD HAVE NOTICED. OR THE OPPOSITE, BEING SO FIGETY OR RESTLESS THAT YOU HAVE BEEN MOVING AROUND A LOT MORE THAN USUAL: NOT AT ALL
3. TROUBLE FALLING OR STAYING ASLEEP: NOT AT ALL
1. LITTLE INTEREST OR PLEASURE IN DOING THINGS: NOT AT ALL
SUM OF ALL RESPONSES TO PHQ QUESTIONS 1-9: 0
2. FEELING DOWN, DEPRESSED OR HOPELESS: NOT AT ALL
5. POOR APPETITE OR OVEREATING: NOT AT ALL

## 2024-12-17 NOTE — PROGRESS NOTES
Attending Physician Statement  I have discussed the care of Josy Boo, including pertinent history and exam findings with the resident. I have reviewed the key elements of all parts of the encounter with the resident.  I agree with the assessment, and status of the problem list as documented. The plan and orders should include No orders of the defined types were placed in this encounter.   and this was also documented by the resident. The medication list was reviewed with the resident and is up to date. The return visit should be in 4 weeks .    Kee Acevedo MD  Attending Physician,  Department of Internal Medicine  [facility]  [facility]      12/17/2024, 9:20 AM

## 2024-12-17 NOTE — PROGRESS NOTES
[facility]  [facility] [address] [address] [place] [address]  Dept: [phone]  Dept Fax: [phone]    Office Progress/Follow Up Note  Date of patient's visit: 2024  Patient's Name:  Josy Boo YOB: 1990            Patient Care Team:  Gabino Martinez MD as PCP - General (Internal Medicine)  Zoltan Melissa MD as Obstetrician (Perinatology)  ________________________________________________________________________      Reason for Visit: Routine outpatient follow up  ________________________________________________________________________  Chief Complaint:  Follow-up (Patient presents today for follow up on Hypertension. Patient has no issues or complaints.)    ________________________________________________________________________  History of Presenting Illness:  History was obtained from: patient, electronic medical record. Josy Boo is a 34 y.o. is here for:    Patient is here for regular follow-up  Past medical history of elevated blood pressure during pregnancy .  Today's blood pressure 126/78.  Has a history of obesity BMI of 39.  Was on Adipex before pregnancy and reported losing about 20 pounds and she expressed interest in restarting it..  Current BMI 38.55, last visit was referred to nutritionist however, she did not set an appointment at.  Currently smokes 1 pack a day since the age of 18.  TSH and lipid profile still pending.  Declined vaccination.  Pap smear done 4 years ago.      Patient Active Problem List   Diagnosis    Herpes genitalia    Pregravid BMI 35.73    H/O  delivery, currently pregnant    H/O classical  section x1    H/O diverticulitis of descending colon    Dyslipidemia    Advanced maternal age    High risk pregnancy, antepartum    Cigarette smoker    H/O peptic ulcer    Patient desires risk reducing bilateral salpingectomy    H/O marijuana use    Bicornate uterus    cHTN (no meds)    Elevated early 1

## 2025-01-07 ENCOUNTER — OFFICE VISIT (OUTPATIENT)
Dept: INTERNAL MEDICINE | Age: 35
End: 2025-01-07
Payer: MEDICAID

## 2025-01-07 VITALS
RESPIRATION RATE: 16 BRPM | BODY MASS INDEX: 37.19 KG/M2 | SYSTOLIC BLOOD PRESSURE: 126 MMHG | WEIGHT: 197 LBS | DIASTOLIC BLOOD PRESSURE: 78 MMHG | OXYGEN SATURATION: 96 % | TEMPERATURE: 97.6 F | HEART RATE: 72 BPM | HEIGHT: 61 IN

## 2025-01-07 DIAGNOSIS — E66.9 OBESITY (BMI 35.0-39.9 WITHOUT COMORBIDITY): Primary | ICD-10-CM

## 2025-01-07 PROCEDURE — G8417 CALC BMI ABV UP PARAM F/U: HCPCS

## 2025-01-07 PROCEDURE — 99213 OFFICE O/P EST LOW 20 MIN: CPT

## 2025-01-07 PROCEDURE — 4004F PT TOBACCO SCREEN RCVD TLK: CPT

## 2025-01-07 PROCEDURE — 99212 OFFICE O/P EST SF 10 MIN: CPT | Performed by: INTERNAL MEDICINE

## 2025-01-07 PROCEDURE — G8427 DOCREV CUR MEDS BY ELIG CLIN: HCPCS

## 2025-01-07 RX ORDER — PHENTERMINE HYDROCHLORIDE 37.5 MG/1
37.5 CAPSULE ORAL EVERY MORNING
Qty: 30 CAPSULE | Refills: 0 | Status: SHIPPED | OUTPATIENT
Start: 2025-01-07 | End: 2025-02-06

## 2025-01-07 ASSESSMENT — PATIENT HEALTH QUESTIONNAIRE - PHQ9
10. IF YOU CHECKED OFF ANY PROBLEMS, HOW DIFFICULT HAVE THESE PROBLEMS MADE IT FOR YOU TO DO YOUR WORK, TAKE CARE OF THINGS AT HOME, OR GET ALONG WITH OTHER PEOPLE: NOT DIFFICULT AT ALL
SUM OF ALL RESPONSES TO PHQ QUESTIONS 1-9: 0
2. FEELING DOWN, DEPRESSED OR HOPELESS: NOT AT ALL
SUM OF ALL RESPONSES TO PHQ QUESTIONS 1-9: 0
SUM OF ALL RESPONSES TO PHQ QUESTIONS 1-9: 0
SUM OF ALL RESPONSES TO PHQ9 QUESTIONS 1 & 2: 0
SUM OF ALL RESPONSES TO PHQ QUESTIONS 1-9: 0
5. POOR APPETITE OR OVEREATING: NOT AT ALL
6. FEELING BAD ABOUT YOURSELF - OR THAT YOU ARE A FAILURE OR HAVE LET YOURSELF OR YOUR FAMILY DOWN: NOT AT ALL
3. TROUBLE FALLING OR STAYING ASLEEP: NOT AT ALL
8. MOVING OR SPEAKING SO SLOWLY THAT OTHER PEOPLE COULD HAVE NOTICED. OR THE OPPOSITE, BEING SO FIGETY OR RESTLESS THAT YOU HAVE BEEN MOVING AROUND A LOT MORE THAN USUAL: NOT AT ALL
1. LITTLE INTEREST OR PLEASURE IN DOING THINGS: NOT AT ALL
4. FEELING TIRED OR HAVING LITTLE ENERGY: NOT AT ALL
7. TROUBLE CONCENTRATING ON THINGS, SUCH AS READING THE NEWSPAPER OR WATCHING TELEVISION: NOT AT ALL
9. THOUGHTS THAT YOU WOULD BE BETTER OFF DEAD, OR OF HURTING YOURSELF: NOT AT ALL

## 2025-01-07 NOTE — PROGRESS NOTES
Attending Physician Statement  I have discussed the care of Josy Boo, including pertinent history and exam findings with the resident. I have reviewed the key elements of all parts of the encounter with the resident. I agree with the assessment, and status of the problem list as documented.    Diagnosis Orders   1. Obesity (BMI 35.0-39.9 without comorbidity)  phentermine (ADIPEX-P) 37.5 MG capsule        The plan and orders should include No orders of the defined types were placed in this encounter.   and this was also documented by the resident.   RTC in 1 month .    Dr Ruslan Cox MD, FACP  Associate , Internal Medicine Residency [facility]  [facility] , [facility]  Chair, Department of Internal Medicine  [facility]         1/7/2025, 9:37 AM        
Problems Sister       ________________________________________________________________________  Review of Systems:  CONSTITUTIONAL: Denies: fever, chills  PSYCH: Denies: anxiety, depression  ALLERGIES: Denies: urticaria  EYES: Denies: blurry vision, decreased vision, photophobia  ENT: Denies: sore throat, nasal congestion  CARDIOVASCULAR: Denies: chest pain, dyspnea on exertion  RESPIRATORY: Denies: cough, hemoptysis, shortness of breath  GI: Denies: Denies: abdominal pain, flank pain  : Denies: Denies: dysuria, frequency/urgency  NEURO: Denies: dizzy/vertigo, headache  MUSCULOSKELETAL: Denies: back pain, joint pain  SKIN: Denies: rash, itching  ________________________________________________________________________  Physical Exam:  Vitals:    01/07/25 0841   BP: 126/78   Site: Right Upper Arm   Position: Sitting   Pulse: 72   Resp: 16   Temp: 97.6 °F (36.4 °C)   SpO2: 96%   Weight: 89.4 kg (197 lb)   Height: 1.549 m (5' 1\")     BP Readings from Last 3 Encounters:   01/07/25 126/78   12/17/24 126/78   09/17/24 130/78         General Examination    General Resting comfortably    Head Normocephalic, without obvious abnormality   Neck Supple, symmetrical. Good ROM. No midline or paraspinal tenderness.    Lungs Respirations unlabored, no wheezing   Chest Wall No deformity   Heart RRR, no murmur   Abdomen Soft. Non-tender, non-distended   Extremities No cyanosis or edema or warmth.   Pulses 2+ and symmetric   Skin: Skin  turgor normal, no rashes or lesions   ________________________________________________________________________  Diagnostic findings:  CBC:  Lab Results   Component Value Date/Time    WBC 13.4 04/19/2023 04:14 AM    HGB 9.2 04/19/2023 04:14 AM     04/19/2023 04:14 AM       BMP:    Lab Results   Component Value Date/Time     04/18/2023 06:52 AM    K 3.9 04/18/2023 06:52 AM     04/18/2023 06:52 AM    CO2 19 04/18/2023 06:52 AM    BUN 7 04/18/2023 06:52 AM    CREATININE 0.43 04/18/2023

## 2025-02-04 ENCOUNTER — OFFICE VISIT (OUTPATIENT)
Dept: INTERNAL MEDICINE | Age: 35
End: 2025-02-04

## 2025-02-04 VITALS
HEIGHT: 61 IN | BODY MASS INDEX: 36.67 KG/M2 | OXYGEN SATURATION: 98 % | HEART RATE: 99 BPM | SYSTOLIC BLOOD PRESSURE: 126 MMHG | TEMPERATURE: 97.9 F | WEIGHT: 194.2 LBS | DIASTOLIC BLOOD PRESSURE: 88 MMHG

## 2025-02-04 DIAGNOSIS — E66.9 OBESITY (BMI 35.0-39.9 WITHOUT COMORBIDITY): Primary | ICD-10-CM

## 2025-02-04 DIAGNOSIS — E78.5 DYSLIPIDEMIA: ICD-10-CM

## 2025-02-04 PROBLEM — R03.0 ELEVATED BLOOD PRESSURE READING: Status: RESOLVED | Noted: 2022-10-26 | Resolved: 2025-02-04

## 2025-02-04 PROBLEM — O09.899 HISTORY OF PRETERM DELIVERY, CURRENTLY PREGNANT: Status: RESOLVED | Noted: 2022-10-25 | Resolved: 2025-02-04

## 2025-02-04 PROBLEM — R03.0 ELEVATED BLOOD PRESSURE READING WITHOUT DIAGNOSIS OF HYPERTENSION: Status: RESOLVED | Noted: 2023-04-26 | Resolved: 2025-02-04

## 2025-02-04 PROBLEM — B25.9 CYTOMEGALOVIRUS (CMV) VIREMIA (HCC): Status: RESOLVED | Noted: 2023-04-12 | Resolved: 2025-02-04

## 2025-02-04 PROBLEM — Z82.49 FAMILY HISTORY OF BLOOD CLOTS: Status: RESOLVED | Noted: 2022-11-09 | Resolved: 2025-02-04

## 2025-02-04 PROBLEM — Z87.19 HISTORY OF DIVERTICULITIS OF COLON: Status: RESOLVED | Noted: 2021-07-13 | Resolved: 2025-02-04

## 2025-02-04 PROBLEM — O09.529 ANTEPARTUM MULTIGRAVIDA OF ADVANCED MATERNAL AGE: Status: RESOLVED | Noted: 2022-10-26 | Resolved: 2025-02-04

## 2025-02-04 PROBLEM — A60.00 HERPES GENITALIA: Status: RESOLVED | Noted: 2017-02-01 | Resolved: 2025-02-04

## 2025-02-04 PROBLEM — K21.9 GASTROESOPHAGEAL REFLUX DISEASE WITHOUT ESOPHAGITIS: Status: RESOLVED | Noted: 2023-04-26 | Resolved: 2025-02-04

## 2025-02-04 PROBLEM — R73.09 ABNORMAL GTT (GLUCOSE TOLERANCE TEST): Status: RESOLVED | Noted: 2022-11-09 | Resolved: 2025-02-04

## 2025-02-04 PROBLEM — Z3A.36 36 WEEKS GESTATION OF PREGNANCY: Status: RESOLVED | Noted: 2023-04-18 | Resolved: 2025-02-04

## 2025-02-04 RX ORDER — PHENTERMINE HYDROCHLORIDE 37.5 MG/1
37.5 CAPSULE ORAL EVERY MORNING
Qty: 30 CAPSULE | Refills: 0 | Status: SHIPPED | OUTPATIENT
Start: 2025-02-04 | End: 2025-02-04 | Stop reason: SDUPTHER

## 2025-02-04 RX ORDER — PHENTERMINE HYDROCHLORIDE 37.5 MG/1
37.5 CAPSULE ORAL EVERY MORNING
Qty: 30 CAPSULE | Refills: 0 | Status: SHIPPED | OUTPATIENT
Start: 2025-02-04 | End: 2025-03-06

## 2025-02-04 SDOH — ECONOMIC STABILITY: FOOD INSECURITY: WITHIN THE PAST 12 MONTHS, YOU WORRIED THAT YOUR FOOD WOULD RUN OUT BEFORE YOU GOT MONEY TO BUY MORE.: NEVER TRUE

## 2025-02-04 SDOH — ECONOMIC STABILITY: FOOD INSECURITY: WITHIN THE PAST 12 MONTHS, THE FOOD YOU BOUGHT JUST DIDN'T LAST AND YOU DIDN'T HAVE MONEY TO GET MORE.: NEVER TRUE

## 2025-02-04 NOTE — PROGRESS NOTES
[facility]  [facility] [place] [id number] [place] [address]  Dept: [phone]  Dept Fax: [phone]    Office Progress/Follow Up Note  Date of patient's visit: 2025  Patient's Name:  Josy Boo YOB: 1990            Patient Care Team:  Gabino Martinez MD as PCP - General (Internal Medicine)  Zoltan Melissa MD as Obstetrician (Perinatology)  ________________________________________________________________________      Reason for Visit: Routine outpatient follow up  ________________________________________________________________________  Chief Complaint:  Follow-up (Patient present for follow-up.)    ________________________________________________________________________  History of Presenting Illness:  History was obtained from: patient, electronic medical record. Josy Boo is a 34 y.o. is here for:    Weight loss follow-up on Adipex.  Which was started on 2024.  Patient last visit on 2025 her weight was 197 pounds down from 204.  Today's weight is 194 pounds.  Has been compliant with the medication, denies side effects.  Reported exercising in the gym with her daughter at least twice weekly the treadmill.  Reported monitoring her diet trying to eat more green food.  Expressed interest in continuation of Adipex.  Will be due for Pap smear in March.      Patient Active Problem List   Diagnosis    H/O classical  section x1    Dyslipidemia    High risk pregnancy, antepartum    Cigarette smoker    Patient desires risk reducing bilateral salpingectomy    H/O marijuana use    Bicornate uterus    Family history of breast cancer    RLTCS w/ RRS 23 F Apg  Wt 6#15    Obesity (BMI 35.0-39.9 without comorbidity)       No Known Allergies      Current Outpatient Medications   Medication Sig Dispense Refill    phentermine (ADIPEX-P) 37.5 MG capsule Take 1 capsule by mouth every morning for 30 days. Max Daily Amount: 37.5 mg 30 capsule 0

## 2025-02-04 NOTE — PROGRESS NOTES
Attending Physician Statement  I have discussed the care of Josy Boo, including pertinent history and exam findings,  with the resident. I have reviewed the key elements of all parts of the encounter with the resident.  I agree with the assessment, plan and orders as documented by the resident.  (GE Modifier)

## (undated) DEVICE — 3M™ STERI-STRIP™ ANTIMICROBIAL SKIN CLOSURES 1 IN X 5 IN, 25/CAR, 4 CAR/CASE A1848: Brand: 3M™ STERI-STRIP™

## (undated) DEVICE — Z DISCONTINUED USE 2711661 SWAB MEDICATED TINC BENZ

## (undated) DEVICE — KENDALL SCD EXPRESS SLEEVES, KNEE LENGTH, MEDIUM: Brand: KENDALL SCD

## (undated) DEVICE — SOLUTION SOD CHL 0.9% 1000ML

## (undated) DEVICE — SOLUTION IV IRRIG WATER 500ML POUR BRL ST 2F7113

## (undated) DEVICE — SUTURE COAT VCRL SZ 0 L36IN ABSRB VLT CTX L48MM TAPERPOINT J370H

## (undated) DEVICE — TRAY SPNL 24GA L4IN PENCAN PNCL PNT NDL 0.75% BIPIVCAIN W/

## (undated) DEVICE — TOWEL SURG W16XL26IN WHT NONFENESTRATED ST 2 PER PK